# Patient Record
Sex: FEMALE | Race: BLACK OR AFRICAN AMERICAN | NOT HISPANIC OR LATINO | Employment: OTHER | URBAN - METROPOLITAN AREA
[De-identification: names, ages, dates, MRNs, and addresses within clinical notes are randomized per-mention and may not be internally consistent; named-entity substitution may affect disease eponyms.]

---

## 2017-07-27 RX ORDER — ENALAPRIL MALEATE 10 MG/1
TABLET ORAL
Qty: 30 TABLET | Refills: 5 | Status: SHIPPED | OUTPATIENT
Start: 2017-07-27 | End: 2018-01-29 | Stop reason: SDUPTHER

## 2017-08-02 ENCOUNTER — OFFICE VISIT (OUTPATIENT)
Dept: FAMILY MEDICINE | Facility: CLINIC | Age: 57
End: 2017-08-02
Payer: COMMERCIAL

## 2017-08-02 VITALS
BODY MASS INDEX: 29.23 KG/M2 | DIASTOLIC BLOOD PRESSURE: 82 MMHG | SYSTOLIC BLOOD PRESSURE: 118 MMHG | HEIGHT: 63 IN | TEMPERATURE: 97 F | WEIGHT: 165 LBS

## 2017-08-02 DIAGNOSIS — N30.01 ACUTE CYSTITIS WITH HEMATURIA: ICD-10-CM

## 2017-08-02 PROBLEM — N30.00 ACUTE CYSTITIS: Status: ACTIVE | Noted: 2017-08-02

## 2017-08-02 LAB
BILIRUB SERPL-MCNC: NORMAL MG/DL
BLOOD, POC UA: NORMAL
GLUCOSE UR QL STRIP: NORMAL
KETONES UR QL STRIP: NORMAL
LEUKOCYTE ESTERASE URINE, POC: NORMAL
NITRITE, POC UA: NORMAL
PH, POC UA: 5
PROTEIN, POC: NORMAL
SPECIFIC GRAVITY, POC UA: 1.01
UROBILINOGEN, POC UA: 0.2

## 2017-08-02 PROCEDURE — 99213 OFFICE O/P EST LOW 20 MIN: CPT | Mod: 25,,, | Performed by: FAMILY MEDICINE

## 2017-08-02 PROCEDURE — 81000 URINALYSIS NONAUTO W/SCOPE: CPT | Mod: ,,, | Performed by: FAMILY MEDICINE

## 2017-08-02 NOTE — PATIENT INSTRUCTIONS
Lactobacillus Oral formulations  What is this medicine?  LACTOBACILLUS (regi MCCLELLAN uhs) is a supplement. It is used to help the normal balance of bacteria in the colon. This may treat or prevent diarrhea caused by an infection or by antibiotics. The FDA has not approved this supplement for any medical use.  This supplement may be used for other purposes; ask your health care provider or pharmacist if you have questions.  How should I use this medicine?  Take this medicine by mouth with a small amount of milk, fruit juice, or water. Follow the directions on the package labeling, or take as directed by your health care professional. This medicine can be taken with cereal or other food. Do not take this medicine more often than directed.  Contact your pediatrician regarding the use of this medicine in children. Special care may be needed. This medicine is not recommended for children under 3 years old unless prescribed by a doctor.  What side effects may I notice from receiving this medicine?  Side effects that you should report to your doctor or health care professional as soon as possible:  · allergic reactions like skin rash, itching or hives, swelling of the face, lips, or tongue  · breathing problems  · severe nausea, vomiting  · unusually weak or tired  Side effects that usually do not require medical attention (report to your doctor or health care professional if they continue or are bothersome):  · hiccups  · stomach gas  What may interact with this medicine?  Interactions are not expected.  What if I miss a dose?  If you miss a dose, take it as soon as you can. If it is almost time for your next dose, take only that dose. Do not take double or extra doses.  Where should I keep my medicine?  Keep out of the reach of children.  Store in the refrigerator or as directed on the package label. Do not freeze. Throw away any unused medicine after the expiration date.  What should I tell my health care provider  before I take this medicine?  They need to know if you have any of these conditions:  · chronic disease  · immune system problems  · prosthetic heart valve or valvular heart disease  · an unusual or allergic reaction to Lactobacillus, any medicines, lactose or milk, other foods, dyes, or preservatives  · pregnant or trying to get pregnant  · breast-feeding  What should I watch for while using this medicine?  See your doctor if your symptoms do not get better or if they get worse. Do not take this supplement for more than 2 days or if you have a fever unless your doctor tells you to.  If you have allergies to milk or you are sensitive to lactose, do not use this supplement.  Date Last Reviewed:   NOTE:This sheet is a summary. It may not cover all possible information. If you have questions about this medicine, talk to your doctor, pharmacist, or health care provider. Copyright© 2016 [employer]

## 2017-08-02 NOTE — PROGRESS NOTES
Subjective:       Patient ID: Catrachita Johnson is a 56 y.o. female.    Chief Complaint: Urinary Tract Infection    Urinary Tract Infection    The current episode started 1 to 4 weeks ago (3 weeks). The problem has been waxing and waning. The pain is at a severity of 5/10. The pain is moderate. There has been no fever. She is sexually active. There is a history of pyelonephritis. Associated symptoms include frequency and urgency. Pertinent negatives include no flank pain or hematuria. She has tried nothing for the symptoms.     Review of Systems   Genitourinary: Positive for frequency and urgency. Negative for flank pain and hematuria.     Urine dipstick shows positive for leukocytes.  Micro exam: negative for WBC's or RBC's.    Objective:      Physical Exam   Abdominal: Soft. Normal appearance and bowel sounds are normal. She exhibits no distension and no mass. There is no tenderness. There is no rebound and no guarding.   Musculoskeletal:        Arms:      Assessment:       1. Acute cystitis with hematuria        Plan:       Catrachita was seen today for urinary tract infection.    Diagnoses and all orders for this visit:    Acute cystitis with hematuria  -     Urinalysis w/reflex to Microscopic         Return in about 1 month (around 9/2/2017).

## 2017-08-07 ENCOUNTER — OFFICE VISIT (OUTPATIENT)
Dept: SURGERY | Facility: CLINIC | Age: 57
End: 2017-08-07
Payer: COMMERCIAL

## 2017-08-07 VITALS
HEIGHT: 63 IN | SYSTOLIC BLOOD PRESSURE: 118 MMHG | DIASTOLIC BLOOD PRESSURE: 82 MMHG | BODY MASS INDEX: 29.23 KG/M2 | WEIGHT: 165 LBS

## 2017-08-07 DIAGNOSIS — R92.8 ABNORMAL MAMMOGRAM OF LEFT BREAST: ICD-10-CM

## 2017-08-07 DIAGNOSIS — R22.32 AXILLARY MASS, LEFT: Primary | ICD-10-CM

## 2017-08-07 PROCEDURE — 99204 OFFICE O/P NEW MOD 45 MIN: CPT | Mod: ,,, | Performed by: SURGERY

## 2017-08-07 PROCEDURE — 3008F BODY MASS INDEX DOCD: CPT | Mod: ,,, | Performed by: SURGERY

## 2017-08-07 NOTE — PROGRESS NOTES
Subjective:       Patient ID: Catrachita Johnson is a 57 y.o. female.    Chief Complaint: Breast Problem (Referred by Dr.Robbert Padilla to eval abnormal mammogram)      HPI:  Patient comes in for evaluation of abnormal mammogram. No complaints prompted this mammogram. Patient denies previous issues with her breast before. The mammogram which was abnormal was left. Abnormality was that of heterogenous microcalcifications. Patient also reports that her left axilla appears somewhat puffy but does not hurt.    Family history is positive for second-degree relatives having breast cancer. Patient has children.Menarche age 9 menopause age 55. Patient is not on hormone replacement.    Past Medical History:   Diagnosis Date    Hypertension      Past Surgical History:   Procedure Laterality Date     SECTION      MYOMECTOMY       Review of patient's allergies indicates:   Allergen Reactions    Bactrim [sulfamethoxazole-trimethoprim]     Cherries     Tetracyclines      Medication List with Changes/Refills   Current Medications    ENALAPRIL (VASOTEC) 10 MG TABLET    TAKE 1 TABLET BY MOUTH EVERY DAY     Family History   Problem Relation Age of Onset    Stroke Mother     No Known Problems Father     Stroke Maternal Grandmother      Social History     Social History    Marital status:      Spouse name: N/A    Number of children: N/A    Years of education: N/A     Social History Main Topics    Smoking status: Never Smoker    Smokeless tobacco: Never Used    Alcohol use No    Drug use: No    Sexual activity: Not Asked     Other Topics Concern    None     Social History Narrative    None         Review of Systems   Constitutional: Negative for appetite change, chills, fever and unexpected weight change.   HENT: Negative for hearing loss, rhinorrhea, sore throat and voice change.    Eyes: Negative for photophobia and visual disturbance.   Respiratory: Negative for cough, choking and shortness of breath.     Cardiovascular: Negative for chest pain, palpitations and leg swelling.   Gastrointestinal: Negative for abdominal pain, blood in stool, constipation, diarrhea, nausea and vomiting.   Endocrine: Negative for cold intolerance, heat intolerance, polydipsia and polyuria.   Musculoskeletal: Negative for arthralgias, back pain, joint swelling and neck stiffness.   Skin: Negative for color change, pallor and rash.   Neurological: Negative for dizziness, seizures, syncope and headaches.   Hematological: Negative for adenopathy. Does not bruise/bleed easily.   Psychiatric/Behavioral: Negative for agitation, behavioral problems and confusion.       Objective:      Physical Exam   Constitutional: She is oriented to person, place, and time. She is cooperative. No distress.   Pulmonary/Chest: Right breast exhibits no inverted nipple, no mass and no skin change. Left breast exhibits no inverted nipple, no mass and no skin change. Breasts are symmetrical.   Genitourinary: No breast tenderness or discharge.   Lymphadenopathy:     Axillary adenopathy: left axilla feels a little more full then right.   Neurological: She is alert and oriented to person, place, and time.   Skin:   Incisions are clean, dry and intact   There is no evidence of infection, hematoma or seroma.        Assessment/Plan:   Axillary mass, left  -     US Breast Left Complete    Abnormal mammogram of left breast  -     US Breast Left Complete  -     Mammo Breast Stereotactic Biopsy Left            Return after the biopsy is done.

## 2017-08-17 ENCOUNTER — TELEPHONE (OUTPATIENT)
Dept: FAMILY MEDICINE | Facility: CLINIC | Age: 57
End: 2017-08-17

## 2017-08-17 NOTE — TELEPHONE ENCOUNTER
----- Message from Seble Santamaria sent at 8/17/2017  3:05 PM CDT -----  Billing is saying that the encounter on 08/02/2017 has no physical exam.    Seble

## 2017-08-24 ENCOUNTER — TELEPHONE (OUTPATIENT)
Dept: FAMILY MEDICINE | Facility: CLINIC | Age: 57
End: 2017-08-24

## 2017-08-24 NOTE — TELEPHONE ENCOUNTER
----- Message from Farrah Benitez MA sent at 8/24/2017  3:50 PM CDT -----  Contact: Catrachita Johnson      ----- Message -----  From: Elvira Butcher  Sent: 8/24/2017  11:26 AM  To: Farrah Benitez MA    Patient called (sounded tearful) wanted me to tell Dr GUY that she just fopund out she has breast cancer.

## 2017-08-28 ENCOUNTER — OFFICE VISIT (OUTPATIENT)
Dept: SURGERY | Facility: CLINIC | Age: 57
End: 2017-08-28
Payer: COMMERCIAL

## 2017-08-28 VITALS
DIASTOLIC BLOOD PRESSURE: 82 MMHG | WEIGHT: 165 LBS | SYSTOLIC BLOOD PRESSURE: 118 MMHG | BODY MASS INDEX: 29.23 KG/M2 | HEIGHT: 63 IN

## 2017-08-28 DIAGNOSIS — D05.12 BREAST NEOPLASM, TIS (DCIS), LEFT: Primary | ICD-10-CM

## 2017-08-28 PROCEDURE — 3008F BODY MASS INDEX DOCD: CPT | Mod: ,,, | Performed by: SURGERY

## 2017-08-28 PROCEDURE — 99214 OFFICE O/P EST MOD 30 MIN: CPT | Mod: ,,, | Performed by: SURGERY

## 2017-08-28 NOTE — PROGRESS NOTES
Patient presents for follow-up after undergoing a percutaneous left breast biopsy. Biopsy came back as DCIS. Patient also had an ultrasound of the left axilla which only showed several  Normal-appearing lymph nodes.    Diagnoses and treatment options discussed with patient and her  at length. Breast packet given to the patient. At this time the patient is leaning towards breast conservation therapy. With going to obtain oncology consultation. Patient will think about her options in the meantime. She is going to follow-up here after consultation is done at which time we'll plan surgical therapy. This case will be discussed with the oncologist after consultation is done.

## 2017-08-29 ENCOUNTER — OFFICE VISIT (OUTPATIENT)
Dept: FAMILY MEDICINE | Facility: CLINIC | Age: 57
End: 2017-08-29
Payer: COMMERCIAL

## 2017-08-29 VITALS
DIASTOLIC BLOOD PRESSURE: 86 MMHG | SYSTOLIC BLOOD PRESSURE: 140 MMHG | BODY MASS INDEX: 28.7 KG/M2 | HEIGHT: 63 IN | WEIGHT: 162 LBS | TEMPERATURE: 98 F | RESPIRATION RATE: 16 BRPM | HEART RATE: 64 BPM

## 2017-08-29 DIAGNOSIS — R10.9 ABDOMINAL PAIN, UNSPECIFIED LOCATION: ICD-10-CM

## 2017-08-29 DIAGNOSIS — K58.9 IRRITABLE BOWEL SYNDROME WITHOUT DIARRHEA: ICD-10-CM

## 2017-08-29 LAB
BILIRUB SERPL-MCNC: NORMAL MG/DL
BLOOD, POC UA: NORMAL
GLUCOSE UR QL STRIP: NORMAL
KETONES UR QL STRIP: NORMAL
LEUKOCYTE ESTERASE URINE, POC: NORMAL
NITRITE, POC UA: NORMAL
PH, POC UA: 7
PROTEIN, POC: NORMAL
SPECIFIC GRAVITY, POC UA: 1.01
UROBILINOGEN, POC UA: 0.2

## 2017-08-29 PROCEDURE — 3008F BODY MASS INDEX DOCD: CPT | Mod: ,,, | Performed by: FAMILY MEDICINE

## 2017-08-29 PROCEDURE — 99213 OFFICE O/P EST LOW 20 MIN: CPT | Mod: 25,,, | Performed by: FAMILY MEDICINE

## 2017-08-29 PROCEDURE — 81000 URINALYSIS NONAUTO W/SCOPE: CPT | Mod: ,,, | Performed by: FAMILY MEDICINE

## 2017-08-29 RX ORDER — OXYBUTYNIN CHLORIDE 5 MG/1
5 TABLET ORAL 3 TIMES DAILY
Qty: 90 TABLET | Refills: 11 | Status: SHIPPED | OUTPATIENT
Start: 2017-08-29 | End: 2017-12-05

## 2017-08-29 NOTE — PROGRESS NOTES
Subjective:       Patient ID: Catrachita Johnson is a 57 y.o. female.    Chief Complaint: Abdominal Pain (left lower quandrant ) and Urinary Tract Infection    Abdominal Pain   This is a new problem. The abdominal pain radiates to the LUQ (fleeting transient). Associated symptoms include dysuria (x 2 dd). Nothing aggravates the pain. She has tried nothing for the symptoms. The treatment provided mild relief. There is no history of ulcerative colitis. Patient's medical history includes UTI.     Review of Systems   Gastrointestinal: Positive for abdominal pain.   Genitourinary: Positive for dysuria (x 2 dd).       Objective:      Physical Exam   Constitutional: She appears well-developed and well-nourished.   HENT:   Head: Normocephalic and atraumatic.   Eyes: Conjunctivae and EOM are normal. Pupils are equal, round, and reactive to light.   Cardiovascular: Normal rate, regular rhythm, normal heart sounds and intact distal pulses.  Exam reveals no gallop and no friction rub.    No murmur heard.  Pulmonary/Chest: Effort normal and breath sounds normal. No respiratory distress. She has no wheezes. She has no rales. She exhibits no tenderness.   Abdominal: Soft. Bowel sounds are normal. She exhibits no distension and no mass. There is no tenderness. There is no rebound and no guarding. No hernia.   Genitourinary: Rectal exam shows guaiac negative stool.   Genitourinary Comments: Nl rectal tone , heme negative       Urine dipstick shows negative for all components.  Micro exam: not done.  occolt blood negative.  Assessment:       1. Irritable bowel syndrome without diarrhea    2. Abdominal pain, unspecified location        Plan:       Catrachita was seen today for abdominal pain and urinary tract infection.    Diagnoses and all orders for this visit:    Irritable bowel syndrome without diarrhea  -     POCT OCCULT BLOOD STOOL  -     oxybutynin (DITROPAN) 5 MG Tab; Take 1 tablet (5 mg total) by mouth 3 (three) times daily.  -      Cancel: Ambulatory referral to Gastroenterology  -     Ambulatory referral to Gastroenterology    Abdominal pain, unspecified location  -     POCT Urinalysis         No Follow-up on file.

## 2017-09-01 ENCOUNTER — OFFICE VISIT (OUTPATIENT)
Dept: HEMATOLOGY/ONCOLOGY | Facility: CLINIC | Age: 57
End: 2017-09-01
Payer: COMMERCIAL

## 2017-09-01 VITALS
TEMPERATURE: 98 F | BODY MASS INDEX: 29.06 KG/M2 | HEIGHT: 63 IN | WEIGHT: 164 LBS | SYSTOLIC BLOOD PRESSURE: 143 MMHG | HEART RATE: 76 BPM | DIASTOLIC BLOOD PRESSURE: 83 MMHG | RESPIRATION RATE: 16 BRPM

## 2017-09-01 DIAGNOSIS — D05.12 DUCTAL CARCINOMA IN SITU (DCIS) OF LEFT BREAST: ICD-10-CM

## 2017-09-01 PROCEDURE — 99203 OFFICE O/P NEW LOW 30 MIN: CPT | Mod: ,,, | Performed by: INTERNAL MEDICINE

## 2017-09-01 PROCEDURE — 3008F BODY MASS INDEX DOCD: CPT | Mod: ,,, | Performed by: INTERNAL MEDICINE

## 2017-09-01 NOTE — LETTER
September 1, 2017      Jean-Pierre Waller MD  [address] [facility] [address]           [facility]  [address]  [address]  [facility] [address]  Phone: [phone]  Fax: [phone]          Patient: Catrachita Johnson   MR Number: [id number]   YOB: 1960   Date of Visit: 9/1/2017       Dear Dr. Jean-Pierre Waller:    Thank you for referring Catrachita Johnson to me for evaluation. Attached you will find relevant portions of my assessment and plan of care.    If you have questions, please do not hesitate to call me. I look forward to following Catrachita Johnson along with you.    Sincerely,    Festus Walter MD    Enclosure  CC:  No Recipients    If you would like to receive this communication electronically, please contact [email or web address] or [phone] to request more information on [employer] Link access.    For providers and/or their staff who would like to refer a patient to [facility], please contact us through our one-stop-shop provider referral line, [facility], at [phone].    If you feel you have received this communication in error or would no longer like to receive these types of communications, please e-mail [email or web address]

## 2017-09-05 ENCOUNTER — SOCIAL WORK (OUTPATIENT)
Dept: HEMATOLOGY/ONCOLOGY | Facility: CLINIC | Age: 57
End: 2017-09-05

## 2017-09-05 ENCOUNTER — OFFICE VISIT (OUTPATIENT)
Dept: RADIATION ONCOLOGY | Facility: CLINIC | Age: 57
End: 2017-09-05
Payer: COMMERCIAL

## 2017-09-05 VITALS
HEART RATE: 62 BPM | DIASTOLIC BLOOD PRESSURE: 85 MMHG | RESPIRATION RATE: 18 BRPM | BODY MASS INDEX: 29.23 KG/M2 | WEIGHT: 165 LBS | TEMPERATURE: 98 F | SYSTOLIC BLOOD PRESSURE: 143 MMHG | HEIGHT: 63 IN

## 2017-09-05 DIAGNOSIS — D05.12 DUCTAL CARCINOMA IN SITU (DCIS) OF LEFT BREAST: Primary | ICD-10-CM

## 2017-09-05 PROCEDURE — 3008F BODY MASS INDEX DOCD: CPT | Mod: ,,, | Performed by: RADIOLOGY

## 2017-09-05 PROCEDURE — 99205 OFFICE O/P NEW HI 60 MIN: CPT | Mod: ,,, | Performed by: RADIOLOGY

## 2017-09-05 NOTE — PROGRESS NOTES
Catrachita Johnson  [id number]  1960 9/5/2017  Festus Walter Md  [address]  [address]  [place] [address]    REASON FOR CONSULTATION: 0, [id number] g3 DCIS ER/AL+ of L breast  TREATMENT GOAL: adjuvant    HISTORY OF PRESENT ILLNESS:   57F with abnormal screening MMG @ L breast    *8/17 Dx MMG/US: microcalcifications at 12:00 L breast   Bx: g2-3 DCIS, solid and comedonecrosis; no invasion; ER/AL+    *Dr. Coulter: L axilla US wnl; pt to decide on lump+RT vs mastectomy  *Dr. Walter: lump+RT; adjuvant WINTERS    Patient is expressing anxiety regarding her new diagnosis. She is without complaint at today's visit. She'll previously reported a lump in the left axilla and this was evaluated by ultrasound by Dr. Coulter and found to be within normal limits. Today she is denying fever, chills, chest pain, shortness of breath, appetite or weight changes, restrict range of motion left upper extremity, nipple discharge, pain within the breast.    Review of Systems   Constitutional: Negative for appetite change, chills, fever and unexpected weight change.   HENT:   Negative for lump/mass, mouth sores, sore throat, trouble swallowing and voice change.    Eyes: Negative for eye problems and icterus.   Respiratory: Negative for cough, hemoptysis and shortness of breath.    Cardiovascular: Negative for chest pain and leg swelling.   Gastrointestinal: Negative for abdominal pain, constipation, diarrhea, nausea and vomiting.   Genitourinary: Negative for dysuria, frequency, hematuria, nocturia and vaginal bleeding.    Musculoskeletal: Negative for back pain, gait problem, neck pain and neck stiffness.   Neurological: Negative for extremity weakness, gait problem, headaches, numbness and seizures.   Hematological: Negative for adenopathy.     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    Hypertension     Mass of left axilla      Past Surgical History:  "  Procedure Laterality Date     SECTION      MYOMECTOMY       Social History     Social History    Marital status:      Spouse name: N/A    Number of children: N/A    Years of education: N/A     Social History Main Topics    Smoking status: Never Smoker    Smokeless tobacco: Never Used    Alcohol use No    Drug use: No    Sexual activity: Not Asked     Other Topics Concern    None     Social History Narrative    None     Family History   Problem Relation Age of Onset    Stroke Mother     No Known Problems Father     Stroke Maternal Grandmother        PRIOR HISTORY OF CHEMOTHERAPY OR RADIOTHERAPY: Please see HPI for patients prior oncologic history.    Medication List with Changes/Refills   Current Medications    ENALAPRIL (VASOTEC) 10 MG TABLET    TAKE 1 TABLET BY MOUTH EVERY DAY    OXYBUTYNIN (DITROPAN) 5 MG TAB    Take 1 tablet (5 mg total) by mouth 3 (three) times daily.     Review of patient's allergies indicates:   Allergen Reactions    Bactrim [sulfamethoxazole-trimethoprim]     Cherries     Tetracyclines        QUALITY OF LIFE: 100%- Normal, No Complaints, No Evidence of Disease    Vitals:    17 0914   BP: (!) 143/85   Pulse: 62   Resp: 18   Temp: 98.1 °F (36.7 °C)   TempSrc: Oral   Weight: 74.8 kg (165 lb)   Height: 5' 3" (1.6 m)   PainSc: 0-No pain       PHYSICAL EXAM:   GENERAL: alert; in no apparent distress. anxious  HEAD: normocephalic, atraumatic.  EYES: pupils are equal, round, reactive to light and accommodation. Sclera anicteric. Conjunctiva not injected.   NOSE/THROAT: no nasal erythema or rhinorrhea. Oropharynx pink, without erythema, ulcerations or thrush.   NECK: no cervical motion rigidity; supple with no masses.  CHEST: clear to auscultation bilaterally; no wheezes, crackles or rubs. Patient is speaking comfortably on room air with normal work of breathing without using accessory muscles of respiration.  CARDIOVASCULAR: regular rate and rhythm; no " murmurs, rubs or gallops.  ABDOMEN: soft, nontender, nondistended. Bowel sounds present.   MUSCULOSKELETAL: no tenderness to palpation along the spine or scapulae. Normal range of motion.  NEUROLOGIC: cranial nerves II-XII intact bilaterally. Strength 5/5 in bilateral upper and lower extremities. No sensory deficits appreciated. Normal gait.  LYMPHATIC: no cervical, supraclavicular or axillary adenopathy appreciated bilaterally.   EXTREMITIES: no clubbing, cyanosis, edema.  SKIN: no erythema, rashes, ulcerations noted.   BREAST: modest cup size; no palpable changes, no nodularity of nipple asymmetry; bx site c/d/i    REVIEW OF IMAGING/PATHOLOGY/LABS: Please see HPI. All images reviewed personally by dictating physician.     ASSESSMENT: 57F with stage 0, [id number] g3 DCIS ER/NH+ of L breast.  PLAN:    has been diagnosed with a mammogram detected ductal carcinoma in situ of the left breast. At her biopsy she underwent ultrasound of the left axilla and this was found to be within normal limits without any evidence of lymphadenopathy. Biopsy returned grade 3, estrogen receptor positive, in situ disease. Today I discussed the options of mastectomy versus lumpectomy plus radiation and the equivalent outcomes in several randomized trials. We further discussed potential options of reconstruction after mastectomy versus different radiation courses to follow lumpectomy. We also briefly discussed the role of anti-estrogen therapy following completion of her local therapy.  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    After our discussion today patient  would like to proceed with lumpectomy plus radiation as her local therapy and does have an appointment with Dr. Coulter later this week to schedule her surgery. My recommendation after her examination today is to provide a hypo-fractionated radiotherapy course, 4050 cGy to the left breast, 5050 cGy to left-sided lumpectomy cavity.     We discussed the risks and benefits of the above treatment and have gone over in detail the acute and late toxicities of radiation therapy to the left breast. The patient expressed  understanding and has signed a consent form which is included in the patients chart. The patient has our contact information and understands that they are free to contact us at any time with questions or concerns regarding radiation therapy.    DISPOSITION: RTC for CT SIM 3wks after lumpectomy  TIME SPENT WITH PATIENT: I have personally seen and evaluated this patient. Approximately one hour was spent in consultation with the patient, greater than 50% of this time was spent discussing the personalized oncologic treatment plan and details of radiation therapy with the patient.     PHYSICIAN: Arnie Gomez Jr, MD

## 2017-09-05 NOTE — PATIENT INSTRUCTIONS
Instructions given on breast radiation and [facility] handout.  Patient instructed to call us when she has date of surgery.

## 2017-09-05 NOTE — PROGRESS NOTES
Met with patient to discuss her distress rating of 7-10.  She is overwhelmed with the recent death of her mother along with being diagnosed with breast cancer.  I provided her with supportive services and encouraged her to attend the various support groups provided at [facility].  I also provided her with my contact information in the event she wishes to pursue counseling.

## 2017-09-07 ENCOUNTER — TELEPHONE (OUTPATIENT)
Dept: RADIATION ONCOLOGY | Facility: CLINIC | Age: 57
End: 2017-09-07

## 2017-09-07 NOTE — PROGRESS NOTES
Called patient to follow-up with her due to her distress rating 7-10 denoted on 09/05/17.  She stated that she is feeling much better since her consult with Dr. Gomez.  She plans to attend as many support groups as her schedule permits.  She continues to deny wanting counseling services at this time.  She has my contact information in the event she wants to pursue psychosocial support.

## 2017-09-08 ENCOUNTER — OFFICE VISIT (OUTPATIENT)
Dept: SURGERY | Facility: CLINIC | Age: 57
End: 2017-09-08
Payer: COMMERCIAL

## 2017-09-08 VITALS
BODY MASS INDEX: 29.23 KG/M2 | HEIGHT: 63 IN | DIASTOLIC BLOOD PRESSURE: 85 MMHG | SYSTOLIC BLOOD PRESSURE: 143 MMHG | WEIGHT: 165 LBS

## 2017-09-08 DIAGNOSIS — D05.12 BREAST NEOPLASM, TIS (DCIS), LEFT: Primary | ICD-10-CM

## 2017-09-08 LAB
ALBUMIN SERPL-MCNC: 4.1 G/DL (ref 3.1–4.7)
ALP SERPL-CCNC: 86 IU/L (ref 40–104)
ALT (SGPT): 21 IU/L (ref 3–33)
AST SERPL-CCNC: 24 IU/L (ref 10–40)
BASOPHILS NFR BLD: 0 K/UL (ref 0–0.2)
BASOPHILS NFR BLD: 0.4 %
BILIRUB SERPL-MCNC: 0.6 MG/DL (ref 0.3–1)
BUN SERPL-MCNC: 12 MG/DL (ref 8–20)
CALCIUM SERPL-MCNC: 9.5 MG/DL (ref 7.7–10.4)
CHLORIDE: 105 MMOL/L (ref 98–110)
CO2 SERPL-SCNC: 28.9 MMOL/L (ref 22.8–31.6)
CREATININE: 0.79 MG/DL (ref 0.6–1.4)
EOSINOPHIL NFR BLD: 0.1 K/UL (ref 0–0.7)
EOSINOPHIL NFR BLD: 0.9 %
ERYTHROCYTE [DISTWIDTH] IN BLOOD BY AUTOMATED COUNT: 12.8 % (ref 11.7–14.9)
GLUCOSE: 83 MG/DL (ref 70–99)
GRAN #: 3.4 K/UL (ref 1.4–6.5)
GRAN%: 62.2 %
HCT VFR BLD AUTO: 36.1 % (ref 36–48)
HGB BLD-MCNC: 11.6 G/DL (ref 12–15)
IMMATURE GRANS (ABS): 0 K/UL (ref 0–1)
IMMATURE GRANULOCYTES: 0.2 %
LYMPH #: 1.6 K/UL (ref 1.2–3.4)
LYMPH%: 29.3 %
MCH RBC QN AUTO: 28.5 PG (ref 25–35)
MCHC RBC AUTO-ENTMCNC: 32.1 G/DL (ref 31–36)
MCV RBC AUTO: 88.7 FL (ref 79–98)
MONO #: 0.4 K/UL (ref 0.1–0.6)
MONO%: 7 %
NUCLEATED RBCS: 0 %
PLATELET # BLD AUTO: 233 K/UL (ref 140–440)
PMV BLD AUTO: 9.8 FL (ref 8.8–12.7)
POTASSIUM SERPL-SCNC: 4.3 MMOL/L (ref 3.5–5)
PROT SERPL-MCNC: 7.6 G/DL (ref 6–8.2)
RBC # BLD AUTO: 4.07 M/UL (ref 3.5–5.5)
SODIUM: 141 MMOL/L (ref 134–144)
WBC # BLD AUTO: 5.4 K/UL (ref 5–10)

## 2017-09-08 PROCEDURE — 99213 OFFICE O/P EST LOW 20 MIN: CPT | Mod: ,,, | Performed by: SURGERY

## 2017-09-08 PROCEDURE — 3008F BODY MASS INDEX DOCD: CPT | Mod: ,,, | Performed by: SURGERY

## 2017-09-08 NOTE — PROGRESS NOTES
Subjective:       Patient ID: Catrachita Johnson is a 57 y.o. female.    Chief Complaint: Other (Reeval for possible left lumpectomy)      HPI:  Patient with left-sided DCIS presents for surgical treatment. She has been seen by oncologist and radiation oncologist. Breast conservation therapy is planned.    Past Medical History:   Diagnosis Date    Cancer     breast    Ductal carcinoma in situ (DCIS) of left breast 2017    Hypertension     Mass of left axilla      Past Surgical History:   Procedure Laterality Date     SECTION      MYOMECTOMY       Review of patient's allergies indicates:   Allergen Reactions    Bactrim [sulfamethoxazole-trimethoprim]     Cherries     Tetracyclines      Medication List with Changes/Refills   Current Medications    ENALAPRIL (VASOTEC) 10 MG TABLET    TAKE 1 TABLET BY MOUTH EVERY DAY    OXYBUTYNIN (DITROPAN) 5 MG TAB    Take 1 tablet (5 mg total) by mouth 3 (three) times daily.     Family History   Problem Relation Age of Onset    Stroke Mother     No Known Problems Father     Stroke Maternal Grandmother      Social History     Social History    Marital status:      Spouse name: N/A    Number of children: N/A    Years of education: N/A     Social History Main Topics    Smoking status: Never Smoker    Smokeless tobacco: Never Used    Alcohol use No    Drug use: No    Sexual activity: Not Asked     Other Topics Concern    None     Social History Narrative    None         Review of Systems    Objective:      Physical Exam   Constitutional: She appears well-developed and well-nourished.  Non-toxic appearance. No distress.   HENT:   Head: Normocephalic and atraumatic. Head is without abrasion and without laceration.   Right Ear: External ear normal.   Left Ear: External ear normal.   Nose: Nose normal.   Mouth/Throat: Oropharynx is clear and moist.   Eyes: EOM are normal. Pupils are equal, round, and reactive to light.   Neck: Trachea normal. No tracheal  deviation and normal range of motion present. No thyroid mass and no thyromegaly present.   Cardiovascular: Normal rate and regular rhythm.    Pulmonary/Chest: Effort normal. No accessory muscle usage. No tachypnea. No respiratory distress.   Abdominal: Soft. Normal appearance and bowel sounds are normal. She exhibits no distension and no mass. There is no hepatosplenomegaly. There is no tenderness. There is no tenderness at McBurney's point and negative Rankin's sign. No hernia.   Lymphadenopathy:     She has no cervical adenopathy.     She has no axillary adenopathy.        Right: No inguinal adenopathy present.        Left: No inguinal adenopathy present.   Neurological: She is alert. Coordination and gait normal.   Skin: Skin is warm and intact.   Psychiatric: She has a normal mood and affect. Her speech is normal and behavior is normal.       Assessment/Plan:   Breast neoplasm, Tis (DCIS), left  -     Ambulatory Referral to External Surgery  -     CBC auto differential; Future; Expected date: 09/08/2017  -     Comprehensive metabolic panel; Future; Expected date: 09/08/2017  -     EKG 12-lead; Future  -     X-Ray Chest PA And Lateral        Planned procedure: Left lumpectomy with x-ray localization, left sentinel node biopsy with frozen section, possible left axillary node dissection    Ancef 2 gm IV on call to OR    NPO past midnight    Americo cloth scrub per protocol    SCDs Bilateral Lower Extremities    I discussed the proposed procedures the the patient including risks, benefits, indications, alternatives and special concerns.  The patient appears to understand and agrees to go ahead with surgery.  I have made no promises, warranties or verbal agreements beyond what was discussed above.    No Follow-up on file.

## 2017-09-26 ENCOUNTER — OFFICE VISIT (OUTPATIENT)
Dept: HEMATOLOGY/ONCOLOGY | Facility: CLINIC | Age: 57
End: 2017-09-26
Payer: COMMERCIAL

## 2017-09-26 VITALS
BODY MASS INDEX: 29.89 KG/M2 | TEMPERATURE: 98 F | HEART RATE: 69 BPM | SYSTOLIC BLOOD PRESSURE: 158 MMHG | DIASTOLIC BLOOD PRESSURE: 91 MMHG | WEIGHT: 168.69 LBS | HEIGHT: 63 IN | RESPIRATION RATE: 18 BRPM

## 2017-09-26 DIAGNOSIS — D05.12 DUCTAL CARCINOMA IN SITU (DCIS) OF LEFT BREAST: Primary | ICD-10-CM

## 2017-09-26 PROCEDURE — 3008F BODY MASS INDEX DOCD: CPT | Mod: ,,, | Performed by: INTERNAL MEDICINE

## 2017-09-26 PROCEDURE — 99214 OFFICE O/P EST MOD 30 MIN: CPT | Mod: ,,, | Performed by: INTERNAL MEDICINE

## 2017-09-26 RX ORDER — HYDROCODONE BITARTRATE AND ACETAMINOPHEN 7.5; 325 MG/1; MG/1
TABLET ORAL
Refills: 0 | COMMUNITY
Start: 2017-09-13 | End: 2018-11-27

## 2017-09-26 NOTE — LETTER
September 26, 2017      Jean-Pierre Waller MD  [address] [facility] [address]           [facility] access hospital Hematology Oncology  [address]  [address]  [facility] [address]  Phone: [phone]  Fax: [phone]          Patient: Catrachita Johnson   MR Number: [id number]   YOB: 1960   Date of Visit: 9/26/2017       Dear Dr. Jean-Pierre Waller:    Thank you for referring Catrachita Johnson to me for evaluation. Attached you will find relevant portions of my assessment and plan of care.    If you have questions, please do not hesitate to call me. I look forward to following Catrachita Johnson along with you.    Sincerely,    Festus Walter MD    Enclosure  CC:  No Recipients    If you would like to receive this communication electronically, please contact [email or web address] or [phone] to request more information on [employer] Link access.    For providers and/or their staff who would like to refer a patient to [facility], please contact us through our one-stop-shop provider referral line, [facility] [place], at [phone].    If you feel you have received this communication in error or would no longer like to receive these types of communications, please e-mail [email or web address]

## 2017-09-26 NOTE — PROGRESS NOTES
[facility] Hematology/Oncology  PROGRESS NOTE      Subjective:       Patient ID:   NAME: Catrachita Johnson : 1960     57 y.o. female    Referring Doc: Marylin  Other Physicians: Patricia Waller    Chief Complaint:  DCIS f/u    History of Present Illness:     Patient returns today for a regularly scheduled follow-up visit.  The patient had her lumpectomy on 2017. She has already met with Dr Gomez prior to the surgery. She is doing ok and appears to be healing well. She is seeing Dr Coulter this Wednesday. No invasive cancer was seen on the specimen and the LN was negative. She denies any CP, SOB, HA's or N/V.             ROS:   GEN: normal without any fever, night sweats or weight loss  HEENT: normal with no HA's, sore throat, stiff neck, changes in vision  CV: normal with no CP, SOB, PND, JACKSON or orthopnea  PULM: normal with no SOB, cough, hemoptysis, sputum or pleuritic pain  GI: normal with no abdominal pain, nausea, vomiting, constipation, diarrhea, melanotic stools, BRBPR, or hematemesis  : normal with no hematuria, dysuria  BREAST: normal with no mass, discharge, pain  SKIN: normal with no rash, erythema, bruising, or swelling    Allergies:  Review of patient's allergies indicates:   Allergen Reactions    Bactrim [sulfamethoxazole-trimethoprim]     Cherries     Tetracyclines        Medications:    Current Outpatient Prescriptions:     enalapril (VASOTEC) 10 MG tablet, TAKE 1 TABLET BY MOUTH EVERY DAY, Disp: 30 tablet, Rfl: 5    hydrocodone-acetaminophen 7.5-325mg (NORCO) 7.5-325 mg per tablet, TK 1 T PO Q 4 H PRN, Disp: , Rfl: 0    oxybutynin (DITROPAN) 5 MG Tab, Take 1 tablet (5 mg total) by mouth 3 (three) times daily., Disp: 90 tablet, Rfl: 11    PMHx/PSHx Updates:  See patient's last visit with me on 2017.  See H&P on 2017        Pathology:  Ductal carcinoma in situ (DCIS) of left breast    Staging form: Onc Breast AJCC V7    - Clinical: Stage 0 (Tis (DCIS), N0, M0) - Signed by Arnie  "Patricia Oscar MD on 9/5/2017 9/13/2017 on chart      Objective:     Vitals:  Blood pressure (!) 158/91, pulse 69, temperature 97.6 °F (36.4 °C), resp. rate 18, height 5' 3" (1.6 m), weight 76.5 kg (168 lb 11.2 oz).    Physical Examination:   GEN: no apparent distress, comfortable; AAOx3  HEAD: atraumatic and normocephalic  EYES: no pallor, no icterus, PERRLA  ENT: OMM, no pharyngeal erythema, external ears WNL; no nasal discharge; no thrush  NECK: no masses, thyroid normal, trachea midline, no LAD/LN's, supple  CV: RRR with no murmur; normal pulse; normal S1 and S2; no pedal edema  CHEST: Normal respiratory effort; CTAB; normal breath sounds; no wheeze or crackles  ABDOM: nontender and nondistended; soft; normal bowel sounds; no rebound/guarding  MUSC/Skeletal: ROM normal; no crepitus; joints normal; no deformities or arthropathy  EXTREM: no clubbing, cyanosis, inflammation or swelling  SKIN: no rashes, lesions, ulcers, petechiae or subcutaneous nodules  : no simons  NEURO: grossly intact; motor/sensory WNL; AAOx3; no tremors  PSYCH: normal mood, affect and behavior  LYMPH: normal cervical, supraclavicular, axillary and groin LN's  BREAST: left lumpectomy - healing well; no drains  place          Labs:   9/8/2017  Lab Results   Component Value Date    WBC 5.4 09/08/2017    HGB 11.6 (L) 09/08/2017    HCT 36.1 09/08/2017    MCV 88.7 09/08/2017     09/08/2017     BMP  Lab Results   Component Value Date     09/08/2017    K 4.3 09/08/2017     09/08/2017    CO2 28.9 09/08/2017    BUN 12 09/08/2017    CREATININE 0.79 09/08/2017    CALCIUM 9.5 09/08/2017             Radiology/Diagnostic Studies:    No results found.    I have reviewed all available lab results and radiology reports.    Assessment/Plan:   (1) 57 y.o. female with diagnosis of DCIS who has been referred by Dr Shafor for oncology evaluation  - followed by Dr Waldo Padilla with GYN  - positive family history of breast cancer (2nd " degree relatives)  - DCIS with no invasive component  - ER+/RI+  - discussed the prognostics of DCIS especially the up to 25% risk of breast cancer development in patients who do not have resective surgery  - Dr Coulter with s/p lumpectomy on 9/13/2017; she will require radiation afterwards to provide equivocal survival benefit when compared to patient who have complete mastectomies  - I discussed the recommendation for postoperative/radiation antihormone therapy with tamoxifen     (2) Irritable bowel syndrome     (3) HTN              PLAN:  1. F/u with Dr Coulter this Thursday  2. Will need to f/u with Dr Gomez with rad/onc  3. RTC in 4-6 weeks  Fax note to Jean-Pierre Waller MD, Patricia Coulter    Discussion:     I have explained all of the above in detail and the patient understands all of the current recommendation(s). I have answered all of their questions to the best of my ability and to their complete satisfaction.   The patient is to continue with the current management plan.            Electronically signed by Festus Walter MD

## 2017-09-28 ENCOUNTER — OFFICE VISIT (OUTPATIENT)
Dept: SURGERY | Facility: CLINIC | Age: 57
End: 2017-09-28
Payer: COMMERCIAL

## 2017-09-28 VITALS
HEIGHT: 63 IN | BODY MASS INDEX: 29.88 KG/M2 | WEIGHT: 168.63 LBS | SYSTOLIC BLOOD PRESSURE: 158 MMHG | DIASTOLIC BLOOD PRESSURE: 91 MMHG

## 2017-09-28 DIAGNOSIS — D05.12 BREAST NEOPLASM, TIS (DCIS), LEFT: Primary | ICD-10-CM

## 2017-09-28 PROCEDURE — 99024 POSTOP FOLLOW-UP VISIT: CPT | Mod: ,,, | Performed by: SURGERY

## 2017-09-28 NOTE — PROGRESS NOTES
Subjective:       Patient ID: Catrachita Johnson is a 57 y.o. female.    Chief Complaint: Post-op Evaluation (FU DOS 9/13/17 Lt Lumpectomy w/X-Ray Loc w/ SNL Bx and FZ Section)      HPI:  Catrachita Johnson is here for post-op. Patient has no systemic complaints. Post operative   pain is under control.  Tolerating diet, no nausea/vomiting.  Having normal bowel movements.        Objective:      Physical Exam   Constitutional: She is oriented to person, place, and time. She is cooperative. No distress.   Neurological: She is alert and oriented to person, place, and time.   Skin:   Incisions are clean, dry and intact   There is no evidence of infection, hematoma or seroma.        Assessment/Plan:   Breast neoplasm, Tis (DCIS), left      No complains except incisional pain with moving her arm. Path shows clear margins and negative sentinel lymph node.    Doing well postop. Follow-up with he mom and radiation oncology. Follow-up here when necessary

## 2017-10-05 ENCOUNTER — DOCUMENTATION ONLY (OUTPATIENT)
Dept: RADIATION ONCOLOGY | Facility: CLINIC | Age: 57
End: 2017-10-05

## 2017-10-05 ENCOUNTER — TREATMENT (OUTPATIENT)
Dept: RADIATION ONCOLOGY | Facility: CLINIC | Age: 57
End: 2017-10-05
Payer: COMMERCIAL

## 2017-10-05 PROCEDURE — 77263 THER RADIOLOGY TX PLNG CPLX: CPT | Mod: ,,, | Performed by: RADIOLOGY

## 2017-10-05 PROCEDURE — 77334 RADIATION TREATMENT AID(S): CPT | Mod: ,,, | Performed by: RADIOLOGY

## 2017-10-05 PROCEDURE — 77290 THER RAD SIMULAJ FIELD CPLX: CPT | Mod: ,,, | Performed by: RADIOLOGY

## 2017-10-05 NOTE — PROGRESS NOTES
Catrachita Johnson  [id number]  1960  10/5/2017  No referring provider defined for this encounter.    DIAGNOSIS: 0, pTisN0(sn)M0 DCIS of L breast  TREATMENT SITE(S): left breast    INTENT: CURATIVE    TREATMENT SETTING: ADJUVANT     MODALITY: PHOTON    TECHNIQUE:  3D CONFORMAL RADIOTHERAPY (3DCRT)    IMRT MEDICAL NECESSITY: N/A    I have personally performed treatment planning for the patient, reviewing relevant history/physical and imaging. I have defined GTV, CTV, PTV and organs at risk.     In order to accomplish this plan, I am ordering:  SIMULATION: CT SIMULATION FOR PLACEMENT OF TREATMENT FIELDS    CONTRAST: none    TO ACCOMPLISH REPRODUCIBLE POSITION: VACLOC, BREAST BOARD and WING BOARD    DEVICES FOR BEAM SHAPING: CUSTOMIZED MLC    CUSTOMIZED BOLUS: none    IMAGING: DAILY KV/KV OBI, WEEKLY PORTALS and CBCT DAILY @ boost    I have ordered a weekly physics check.    SPECIAL PHYSICS CONSULT: NO  REASON: N/A    SPECIAL TREATMENT CIRCUMSTANCE: NO  Concurrent or recent administration of chemotherapeutic agents which are known potent radiosensitizers and thus will require vigilant monitoring for  exaggerated radiation toxicities.    LABS: NONE    ANTICIPATED PRESCRIPTION: 4050cGy/15frx to L breast + 1000cGY/5frx to lumpectomy (total 5050cGy/20frx) using 6/23X to isocenter    TREATMENT: DAILY    PHYSICIAN: Arnie Gomez Jr, MD

## 2017-10-06 PROCEDURE — 77300 RADIATION THERAPY DOSE PLAN: CPT | Mod: ,,, | Performed by: RADIOLOGY

## 2017-10-06 PROCEDURE — 77334 RADIATION TREATMENT AID(S): CPT | Mod: ,,, | Performed by: RADIOLOGY

## 2017-10-06 PROCEDURE — 77295 3-D RADIOTHERAPY PLAN: CPT | Mod: ,,, | Performed by: RADIOLOGY

## 2017-10-10 ENCOUNTER — TREATMENT (OUTPATIENT)
Dept: RADIATION ONCOLOGY | Facility: CLINIC | Age: 57
End: 2017-10-10
Payer: COMMERCIAL

## 2017-10-10 PROCEDURE — G6014 RADIATION TREATMENT DELIVERY: HCPCS | Mod: ,,, | Performed by: RADIOLOGY

## 2017-10-10 PROCEDURE — G6002 STEREOSCOPIC X-RAY GUIDANCE: HCPCS | Mod: ,,, | Performed by: RADIOLOGY

## 2017-10-10 PROCEDURE — 77280 THER RAD SIMULAJ FIELD SMPL: CPT | Mod: ,,, | Performed by: RADIOLOGY

## 2017-10-17 PROCEDURE — 77427 RADIATION TX MANAGEMENT X5: CPT | Mod: ,,, | Performed by: RADIOLOGY

## 2017-10-19 ENCOUNTER — TREATMENT (OUTPATIENT)
Dept: RADIATION ONCOLOGY | Facility: CLINIC | Age: 57
End: 2017-10-19
Payer: COMMERCIAL

## 2017-10-19 PROCEDURE — G6002 STEREOSCOPIC X-RAY GUIDANCE: HCPCS | Mod: ,,, | Performed by: RADIOLOGY

## 2017-10-19 PROCEDURE — G6014 RADIATION TREATMENT DELIVERY: HCPCS | Mod: ,,, | Performed by: RADIOLOGY

## 2017-10-24 PROCEDURE — 77427 RADIATION TX MANAGEMENT X5: CPT | Mod: ,,, | Performed by: RADIOLOGY

## 2017-10-25 ENCOUNTER — TREATMENT (OUTPATIENT)
Dept: RADIATION ONCOLOGY | Facility: CLINIC | Age: 57
End: 2017-10-25
Payer: COMMERCIAL

## 2017-10-25 PROCEDURE — G6014 RADIATION TREATMENT DELIVERY: HCPCS | Mod: ,,, | Performed by: RADIOLOGY

## 2017-10-25 PROCEDURE — G6002 STEREOSCOPIC X-RAY GUIDANCE: HCPCS | Mod: ,,, | Performed by: RADIOLOGY

## 2017-10-26 ENCOUNTER — TREATMENT (OUTPATIENT)
Dept: RADIATION ONCOLOGY | Facility: CLINIC | Age: 57
End: 2017-10-26
Payer: COMMERCIAL

## 2017-10-26 PROCEDURE — 77307 TELETHX ISODOSE PLAN CPLX: CPT | Mod: ,,, | Performed by: RADIOLOGY

## 2017-10-26 PROCEDURE — G6014 RADIATION TREATMENT DELIVERY: HCPCS | Mod: ,,, | Performed by: RADIOLOGY

## 2017-10-26 PROCEDURE — G6002 STEREOSCOPIC X-RAY GUIDANCE: HCPCS | Mod: ,,, | Performed by: RADIOLOGY

## 2017-10-26 PROCEDURE — 77334 RADIATION TREATMENT AID(S): CPT | Mod: ,,, | Performed by: RADIOLOGY

## 2017-10-26 PROCEDURE — 77290 THER RAD SIMULAJ FIELD CPLX: CPT | Mod: ,,, | Performed by: RADIOLOGY

## 2017-10-31 ENCOUNTER — TREATMENT (OUTPATIENT)
Dept: RADIATION ONCOLOGY | Facility: CLINIC | Age: 57
End: 2017-10-31
Payer: COMMERCIAL

## 2017-10-31 DIAGNOSIS — D05.12 DUCTAL CARCINOMA IN SITU (DCIS) OF LEFT BREAST: Primary | ICD-10-CM

## 2017-10-31 PROCEDURE — 77014 PR  CT GUIDANCE PLACEMENT RAD THERAPY FIELDS: CPT | Mod: ,,, | Performed by: RADIOLOGY

## 2017-10-31 PROCEDURE — 77427 RADIATION TX MANAGEMENT X5: CPT | Mod: ,,, | Performed by: RADIOLOGY

## 2017-10-31 PROCEDURE — G6012 RADIATION TREATMENT DELIVERY: HCPCS | Mod: ,,, | Performed by: RADIOLOGY

## 2017-10-31 RX ORDER — SILVER SULFADIAZINE 10 G/1000G
CREAM TOPICAL 2 TIMES DAILY
Qty: 4 G | Refills: 2 | Status: SHIPPED | OUTPATIENT
Start: 2017-10-31 | End: 2018-11-27

## 2017-11-06 ENCOUNTER — TREATMENT (OUTPATIENT)
Dept: RADIATION ONCOLOGY | Facility: CLINIC | Age: 57
End: 2017-11-06
Payer: COMMERCIAL

## 2017-11-06 PROCEDURE — 77014 PR  CT GUIDANCE PLACEMENT RAD THERAPY FIELDS: CPT | Mod: ,,, | Performed by: RADIOLOGY

## 2017-11-06 PROCEDURE — 77336 RADIATION PHYSICS CONSULT: CPT | Mod: ,,, | Performed by: RADIOLOGY

## 2017-11-06 PROCEDURE — G6012 RADIATION TREATMENT DELIVERY: HCPCS | Mod: ,,, | Performed by: RADIOLOGY

## 2017-11-07 ENCOUNTER — OFFICE VISIT (OUTPATIENT)
Dept: HEMATOLOGY/ONCOLOGY | Facility: CLINIC | Age: 57
End: 2017-11-07
Payer: COMMERCIAL

## 2017-11-07 VITALS
WEIGHT: 165.63 LBS | TEMPERATURE: 98 F | RESPIRATION RATE: 18 BRPM | HEART RATE: 73 BPM | HEIGHT: 63 IN | DIASTOLIC BLOOD PRESSURE: 78 MMHG | BODY MASS INDEX: 29.35 KG/M2 | SYSTOLIC BLOOD PRESSURE: 126 MMHG

## 2017-11-07 DIAGNOSIS — D05.12 DUCTAL CARCINOMA IN SITU (DCIS) OF LEFT BREAST: Primary | ICD-10-CM

## 2017-11-07 PROCEDURE — 99214 OFFICE O/P EST MOD 30 MIN: CPT | Mod: ,,, | Performed by: INTERNAL MEDICINE

## 2017-11-07 NOTE — LETTER
November 7, 2017      Jean-Pierre Waller MD  [address] [facility] [address]           [facility] Hematology Oncology  [address]  [address]  [facility] [address]  Phone: [phone]  Fax: [phone]          Patient: Catrachita Johnson   MR Number: [id number]   YOB: 1960   Date of Visit: 11/7/2017       Dear Dr. Jean-Pierre Waller:    Thank you for referring Catrachita Johnson to me for evaluation. Attached you will find relevant portions of my assessment and plan of care.    If you have questions, please do not hesitate to call me. I look forward to following Catrachita Johnson along with you.    Sincerely,    Festus Walter MD    Enclosure  CC:  No Recipients    If you would like to receive this communication electronically, please contact [email or web address] or [phone] to request more information on [email or web address] Link access.    For providers and/or their staff who would like to refer a patient to [facility], please contact us through our one-stop-shop provider referral line, [facility], at [phone].    If you feel you have received this communication in error or would no longer like to receive these types of communications, please e-mail [email or web address]

## 2017-11-07 NOTE — PROGRESS NOTES
[facility] Hematology/Oncology  PROGRESS NOTE      Subjective:       Patient ID:   NAME: Catrachita Johnson : 1960     57 y.o. female    Referring Doc: Jean-Pierre Waller MD  Other Physicians: Patricia Coulter    Chief Complaint:  DCIS f/u    History of Present Illness:     Patient returns today for a regularly scheduled follow-up visit.  The patient completed XRT yesterday and has had some skin burning as a result. She saw Dr Gomez last week and sees him again next week. She denies any CP, SOB, HA's or N/V. Some pain at the radiation burn site.             ROS:   GEN: normal without any fever, night sweats or weight loss  HEENT: normal with no HA's, sore throat, stiff neck, changes in vision  CV: normal with no CP, SOB, PND, JACKSON or orthopnea  PULM: normal with no SOB, cough, hemoptysis, sputum or pleuritic pain  GI: normal with no abdominal pain, nausea, vomiting, constipation, diarrhea, melanotic stools, BRBPR, or hematemesis  : normal with no hematuria, dysuria  BREAST: normal with no mass, discharge, pain  SKIN: normal with no rash, erythema, bruising, or swelling    Allergies:  Review of patient's allergies indicates:   Allergen Reactions    Bactrim [sulfamethoxazole-trimethoprim]     Chersandra     Tetracyclines        Medications:    Current Outpatient Prescriptions:     enalapril (VASOTEC) 10 MG tablet, TAKE 1 TABLET BY MOUTH EVERY DAY, Disp: 30 tablet, Rfl: 5    hydrocodone-acetaminophen 7.5-325mg (NORCO) 7.5-325 mg per tablet, TK 1 T PO Q 4 H PRN, Disp: , Rfl: 0    oxybutynin (DITROPAN) 5 MG Tab, Take 1 tablet (5 mg total) by mouth 3 (three) times daily., Disp: 90 tablet, Rfl: 11    silver sulfADIAZINE 1% (SILVADENE) 1 % cream, Apply topically 2 (two) times daily., Disp: 4 g, Rfl: 2    PMHx/PSHx Updates:  See patient's last visit with me on 2017.  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 rate 18, height 5' 3" (1.6 m), weight 75.1 kg (165 lb 9.6 oz).    Physical Examination:   GEN: no apparent distress, comfortable; AAOx3  HEAD: atraumatic and normocephalic  EYES: no pallor, no icterus, PERRLA  ENT: OMM, no pharyngeal erythema, external ears WNL; no nasal discharge; no thrush  NECK: no masses, thyroid normal, trachea midline, no LAD/LN's, supple  CV: RRR with no murmur; normal pulse; normal S1 and S2; no pedal edema  CHEST: Normal respiratory effort; CTAB; normal breath sounds; no wheeze or crackles  ABDOM: nontender and nondistended; soft; normal bowel sounds; no rebound/guarding  MUSC/Skeletal: ROM normal; no crepitus; joints normal; no deformities or arthropathy  EXTREM: no clubbing, cyanosis, inflammation or swelling  SKIN: no rashes, lesions, ulcers, petechiae or subcutaneous nodules; radiation burn  : no simons  NEURO: grossly intact; motor/sensory WNL; AAOx3; no tremors  PSYCH: normal mood, affect and behavior  LYMPH: normal cervical, supraclavicular, axillary and groin LN's            Labs:     9/8/2017  Lab Results   Component Value Date    WBC 5.4 09/08/2017    HGB 11.6 (L) 09/08/2017    HCT 36.1 09/08/2017    MCV 88.7 09/08/2017     09/08/2017           Radiology/Diagnostic Studies:    No results found.    I have reviewed all available lab results and radiology reports.    Assessment/Plan:   (1) 57 y.o. female with diagnosis of DCIS who has been referred by Dr Coulter for oncology evaluation  - followed by Dr Waldo Padilla with GYN  - positive family history of breast cancer (2nd degree relatives)  - DCIS with no invasive component  - ER+/MN+  - discussed the prognostics of DCIS especially the up to 25% risk of breast cancer development in patients who do not have resective surgery  - Dr Coulter with s/p lumpectomy on " 9/13/2017; required radiation afterwards to provide equivocal survival benefit when compared to patient who have complete mastectomies  - I discussed the recommendation for postoperative radiation and also antihormone therapy with tamoxifen     (2) Irritable bowel syndrome     (3) HTN - followed by PCP      PLAN:  1. Discussed tamoxifen but will give her couple of weeks to heal up first  2. F/u with Dr Gomez in 3 weeks  3. Check with rad/onc about use of cool packs  4. Continue with aquafore and silvadene cream and recommend avoiding use of sports bra for now  RTC in 3 weeks  Fax note to Jean-Pierre Waller MD, Patricia Coulter [place]    Discussion:     I have explained all of the above in detail and the patient understands all of the current recommendation(s). I have answered all of their questions to the best of my ability and to their complete satisfaction.   The patient is to continue with the current management plan.            Electronically signed by Festus Walter MD

## 2017-11-27 ENCOUNTER — OFFICE VISIT (OUTPATIENT)
Dept: RADIATION ONCOLOGY | Facility: CLINIC | Age: 57
End: 2017-11-27
Payer: COMMERCIAL

## 2017-11-27 ENCOUNTER — OFFICE VISIT (OUTPATIENT)
Dept: HEMATOLOGY/ONCOLOGY | Facility: CLINIC | Age: 57
End: 2017-11-27
Payer: COMMERCIAL

## 2017-11-27 VITALS — WEIGHT: 164.5 LBS | BODY MASS INDEX: 29.14 KG/M2

## 2017-11-27 VITALS
BODY MASS INDEX: 29.26 KG/M2 | WEIGHT: 165.19 LBS | DIASTOLIC BLOOD PRESSURE: 76 MMHG | RESPIRATION RATE: 18 BRPM | TEMPERATURE: 98 F | HEART RATE: 81 BPM | SYSTOLIC BLOOD PRESSURE: 137 MMHG

## 2017-11-27 DIAGNOSIS — D05.12 DUCTAL CARCINOMA IN SITU (DCIS) OF LEFT BREAST: Primary | ICD-10-CM

## 2017-11-27 PROCEDURE — 99214 OFFICE O/P EST MOD 30 MIN: CPT | Mod: ,,, | Performed by: INTERNAL MEDICINE

## 2017-11-27 PROCEDURE — 99024 POSTOP FOLLOW-UP VISIT: CPT | Mod: ,,, | Performed by: RADIOLOGY

## 2017-11-27 RX ORDER — TAMOXIFEN CITRATE 20 MG/1
20 TABLET ORAL DAILY
Qty: 30 TABLET | Refills: 4 | Status: SHIPPED | OUTPATIENT
Start: 2017-11-27 | End: 2018-04-19 | Stop reason: SDUPTHER

## 2017-11-27 NOTE — LETTER
November 27, 2017      Jean-Pierre Waller MD  [address] [facility] [address]           [facility] Hematology Oncology  [address]  [address]  [facility] [address]  Phone: [phone]  Fax: [phone]          Patient: Catrachita Johnson   MR Number: [id number]   YOB: 1960   Date of Visit: 11/27/2017       Dear Dr. Jean-Pierre Waller:    Thank you for referring Catrachita Johnson to me for evaluation. Attached you will find relevant portions of my assessment and plan of care.    If you have questions, please do not hesitate to call me. I look forward to following Catrachita Johnson along with you.    Sincerely,    Festus Walter MD    Enclosure  CC:  No Recipients    If you would like to receive this communication electronically, please contact [email or web address] or [phone] to request more information on [employer] Link access.    For providers and/or their staff who would like to refer a patient to [facility], please contact us through our one-stop-shop provider referral line, [facility], at [phone].    If you feel you have received this communication in error or would no longer like to receive these types of communications, please e-mail [email or web address]

## 2017-11-27 NOTE — PROGRESS NOTES
[facility] Hematology/Oncology  PROGRESS NOTE      Subjective:       Patient ID:   NAME: Catrachita Johnson : 1960     57 y.o. female    Referring Doc: Sridhar  Other Physicians: Marylin Gomez Eddington    Chief Complaint:  DCIS f/u    History of Present Illness:     Patient returns today for a regularly scheduled follow-up visit.  The patient saw Dr Gomez earlier today. He plans to get repeat mammogram in six months.The skin is healing post-XRT. She denies any CP,SOb, HA's or N/V.             ROS:   GEN: normal without any fever, night sweats or weight loss  HEENT: normal with no HA's, sore throat, stiff neck, changes in vision  CV: normal with no CP, SOB, PND, JACKSON or orthopnea  PULM: normal with no SOB, cough, hemoptysis, sputum or pleuritic pain  GI: normal with no abdominal pain, nausea, vomiting, constipation, diarrhea, melanotic stools, BRBPR, or hematemesis  : normal with no hematuria, dysuria  BREAST: normal with no mass, discharge, pain  SKIN: normal with no rash, erythema, bruising, or swelling    Allergies:  Review of patient's allergies indicates:   Allergen Reactions    Bactrim [sulfamethoxazole-trimethoprim]     Cherries     Tetracyclines        Medications:    Current Outpatient Prescriptions:     enalapril (VASOTEC) 10 MG tablet, TAKE 1 TABLET BY MOUTH EVERY DAY, Disp: 30 tablet, Rfl: 5    hydrocodone-acetaminophen 7.5-325mg (NORCO) 7.5-325 mg per tablet, TK 1 T PO Q 4 H PRN, Disp: , Rfl: 0    oxybutynin (DITROPAN) 5 MG Tab, Take 1 tablet (5 mg total) by mouth 3 (three) times daily., Disp: 90 tablet, Rfl: 11    silver sulfADIAZINE 1% (SILVADENE) 1 % cream, Apply topically 2 (two) times daily., Disp: 4 g, Rfl: 2    PMHx/PSHx Updates:  See patient's last visit with me on 2017.  See H&P on 2017        Pathology:  Ductal carcinoma in situ (DCIS) of left breast    Staging form: Onc Breast AJCC V7    - Clinical: Stage 0 (Tis (DCIS), N0, M0) - Signed by Arnie Gomez Jr., MD on  9/5/2017          Objective:     Vitals:  Blood pressure 137/76, pulse 81, temperature 98.3 °F (36.8 °C), resp. rate 18, weight 74.9 kg (165 lb 3.2 oz).    Physical Examination:   GEN: no apparent distress, comfortable; AAOx3  HEAD: atraumatic and normocephalic  EYES: no pallor, no icterus, PERRLA  ENT: OMM, no pharyngeal erythema, external ears WNL; no nasal discharge; no thrush  NECK: no masses, thyroid normal, trachea midline, no LAD/LN's, supple  CV: RRR with no murmur; normal pulse; normal S1 and S2; no pedal edema  CHEST: Normal respiratory effort; CTAB; normal breath sounds; no wheeze or crackles  ABDOM: nontender and nondistended; soft; normal bowel sounds; no rebound/guarding  MUSC/Skeletal: ROM normal; no crepitus; joints normal; no deformities or arthropathy  EXTREM: no clubbing, cyanosis, inflammation or swelling  SKIN: no rashes, lesions, ulcers, petechiae or subcutaneous nodules  : no simons  NEURO: grossly intact; motor/sensory WNL; AAOx3; no tremors  PSYCH: normal mood, affect and behavior  LYMPH: normal cervical, supraclavicular, axillary and groin LN's            Labs:     9/8/2017      Radiology/Diagnostic Studies:    No results found.    I have reviewed all available lab results and radiology reports.    Assessment/Plan:   (1) 57 y.o. female with diagnosis of DCIS who has been referred by Dr Coulter for oncology evaluation  - followed by Dr Waldo Padilla with GYN  - positive family history of breast cancer (2nd degree relatives)  - DCIS with no invasive component  - ER+/[place]+  - discussed the prognostics of DCIS especially the up to 25% risk of breast cancer development in patients who do not have resective surgery  - Dr Coulter with s/p lumpectomy on 9/13/2017; required radiation afterwards to provide equivocal survival benefit when compared to patient who have complete mastectomies  - I discussed the recommendation for postoperative radiation and she completed XRT on Nov 6th  - also antihormone  therapy with tamoxifen - I discussed the side-effect profile and the need to f/u with GYN yearly     (2) Irritable bowel syndrome     (3) HTN - followed by PCP      PLAN:  1. Script and consent for tamoxifen  2. F/u with PCP, GenSurg, Rad/onc, GYN   3. Rad/onc to get mammogram in 6 months  4. RTC in 1 month  Fax note to  Jean-Pierre Waller MD, Patricia Coulter [place]    Discussion:     I have explained all of the above in detail and the patient understands all of the current recommendation(s). I have answered all of their questions to the best of my ability and to their complete satisfaction.   The patient is to continue with the current management plan.            Electronically signed by Festus Walter MD

## 2017-11-27 NOTE — PROGRESS NOTES
Catrachita Johnson  [id number]  1960 11/27/2017  Festus Walter Md  [address]  [address]  [place] [address]    DIAGNOSIS: Ductal carcinoma in situ (DCIS) of left breast    Staging form: Onc Breast AJCC V7    - Clinical: Stage 0 (Tis (DCIS), N0, M0) - Signed by Arnie Gomez Jr., MD on 9/5/2017  REASON FOR VISIT: Routine scheduled follow-up.    HISTORY OF PRESENT ILLNESS:   57F with abnormal screening MMG @ L breast    *8/17 Dx MMG/US: microcalcifications at 12:00 L breast   Bx: g2-3 DCIS, solid and comedonecrosis; no invasion; ER/OR+    *Dr. Coulter: L axilla US wnl; pt to decide on lump+RT vs mastectomy  *Dr. Walter: lump+RT; adjuvant WINTERS    Lumpectomy revealed high-grade ductal carcinoma in situ that was estrogen receptor positive with evidence comedonecrosis, negative margins.    Patient completed adjuvant radiotherapy using hypo-fractionated regimen to 5050 cGy, ending November 2017.    INTERVAL HISTORY:   Since completion of therapy the patient has appreciated some sloughing of skin as well as continued discoloration of the left breast with minimal swelling. She has good range of motion left upper extremity without any swelling. She denies shortness of breath, chest pain, bone pain, fever, chills, weight changes. She reports her energy is presently about 75%    Review of Systems   Constitutional: Positive for fatigue. Negative for appetite change, chills, fever and unexpected weight change.   HENT:   Negative for lump/mass, mouth sores, sore throat, trouble swallowing and voice change.    Eyes: Negative for eye problems and icterus.   Respiratory: Negative for cough, hemoptysis and shortness of breath.    Cardiovascular: Negative for chest pain and leg swelling.   Gastrointestinal: Negative for abdominal pain, constipation, diarrhea, nausea and vomiting.   Genitourinary: Negative for dysuria, frequency, hematuria, nocturia and vaginal bleeding.    Musculoskeletal: Negative for back pain, gait  problem, neck pain and neck stiffness.   Skin: Positive for rash.   Neurological: Negative for extremity weakness, gait problem, headaches, numbness and seizures.   Hematological: Negative for adenopathy.     Past Medical History:   Diagnosis Date    Cancer     breast    Ductal carcinoma in situ (DCIS) of left breast 2017    Hypertension     Mass of left axilla      Past Surgical History:   Procedure Laterality Date    BREAST SURGERY Left 2017    Lumpectomy     SECTION      MYOMECTOMY       Social History     Social History    Marital status:      Spouse name: N/A    Number of children: N/A    Years of education: N/A     Social History Main Topics    Smoking status: Never Smoker    Smokeless tobacco: Never Used    Alcohol use No    Drug use: No    Sexual activity: Not on file     Other Topics Concern    Not on file     Social History Narrative    No narrative on file     Family History   Problem Relation Age of Onset    Stroke Mother     No Known Problems Father     Stroke Maternal Grandmother      Medication List with Changes/Refills   New Medications    TAMOXIFEN (NOLVADEX) 20 MG TAB    Take 1 tablet (20 mg total) by mouth once daily.   Current Medications    ENALAPRIL (VASOTEC) 10 MG TABLET    TAKE 1 TABLET BY MOUTH EVERY DAY    HYDROCODONE-ACETAMINOPHEN 7.5-325MG (NORCO) 7.5-325 MG PER TABLET    TK 1 T PO Q 4 H PRN    OXYBUTYNIN (DITROPAN) 5 MG TAB    Take 1 tablet (5 mg total) by mouth 3 (three) times daily.    SILVER SULFADIAZINE 1% (SILVADENE) 1 % CREAM    Apply topically 2 (two) times daily.     Review of patient's allergies indicates:   Allergen Reactions    Bactrim [sulfamethoxazole-trimethoprim]     Cherries     Tetracyclines        QUALITY OF LIFE: 90%- Able to Carry on Normal Activity: Minor Symptoms of Disease    Vitals:    17 1140   Weight: 74.6 kg (164 lb 8 oz)       PHYSICAL EXAM:   GENERAL: alert; in no apparent distress.   HEAD: normocephalic,  atraumatic.  EYES: pupils are equal, round, reactive to light and accommodation. Sclera anicteric. Conjunctiva not injected.   NOSE/THROAT: no nasal erythema or rhinorrhea. Oropharynx pink, without erythema, ulcerations or thrush.   NECK: no cervical motion rigidity; supple with no masses.  CHEST: Patient is speaking comfortably on room air with normal work of breathing without using accessory muscles of respiration.  ABDOMEN: soft, nontender, nondistended. Bowel sounds present.   MUSCULOSKELETAL: no tenderness to palpation along the spine or scapulae. Normal range of motion.  NEUROLOGIC: cranial nerves II-XII intact bilaterally. Strength 5/5 in bilateral upper and lower extremities. No sensory deficits appreciated.  Normal gait.  LYMPHATIC: no axillary adenopathy appreciated bilaterally.   EXTREMITIES: no clubbing, cyanosis, edema.  L BREAST: Hyperpigmentation with scattered sloughing and no evidence of desquamation. There is no nodularity, seroma, or signs of recurrent disease.    ANCILLARY DATA: none    ASSESSMENT: 57F with stage 0, [id number] g3 DCIS ER/ID+ of L breast s/p lumpectomy and adjuvant RT ending 11/17.  PLAN:   did very well with her radiation course and had expected side effect of radiation dermatitis with some fatigue. She is managing keep her self busy and reports her fatigue is correcting and she is using a naturopathic remedy for skin care. On today's examination her skin is healed very nicely and I told her to expect some sloughing of a hyperpigmented superficial layer. She has plans proceed with tamoxifen per Dr. Walter I recommended a mammogram in 6 months to order for her. Return to clinic in 6 months    All questions answered and contact information provided. Patient understands free to call us anytime with any questions or concerns regarding radiation therapy.    TIME SPENT WITH PATIENT: I have personally seen and evaluated this patient. Approximately 30 minutes were spent with  the patient discussing follow-up careplan.     PHYSICIAN: Arnie Gomez Jr, MD

## 2017-12-05 ENCOUNTER — OFFICE VISIT (OUTPATIENT)
Dept: FAMILY MEDICINE | Facility: CLINIC | Age: 57
End: 2017-12-05
Payer: COMMERCIAL

## 2017-12-05 VITALS
DIASTOLIC BLOOD PRESSURE: 80 MMHG | WEIGHT: 166 LBS | BODY MASS INDEX: 29.41 KG/M2 | HEART RATE: 76 BPM | HEIGHT: 63 IN | SYSTOLIC BLOOD PRESSURE: 134 MMHG | TEMPERATURE: 99 F | RESPIRATION RATE: 16 BRPM

## 2017-12-05 DIAGNOSIS — R31.9 HEMATURIA, UNSPECIFIED TYPE: Primary | ICD-10-CM

## 2017-12-05 DIAGNOSIS — N30.01 ACUTE CYSTITIS WITH HEMATURIA: ICD-10-CM

## 2017-12-05 DIAGNOSIS — N30.01 ACUTE HEMORRHAGIC CYSTITIS: ICD-10-CM

## 2017-12-05 LAB
BILIRUB SERPL-MCNC: ABNORMAL MG/DL
BLOOD URINE, POC: ABNORMAL
COLOR, POC UA: ABNORMAL
GLUCOSE UR QL STRIP: ABNORMAL
KETONES UR QL STRIP: ABNORMAL
LEUKOCYTE ESTERASE URINE, POC: ABNORMAL
NITRITE, POC UA: POSITIVE
PH, POC UA: 5
PROTEIN, POC: ABNORMAL
SPECIFIC GRAVITY, POC UA: 1.02
UROBILINOGEN, POC UA: 0.2

## 2017-12-05 PROCEDURE — 81001 URINALYSIS AUTO W/SCOPE: CPT | Mod: ,,, | Performed by: FAMILY MEDICINE

## 2017-12-05 PROCEDURE — 99213 OFFICE O/P EST LOW 20 MIN: CPT | Mod: 25,,, | Performed by: FAMILY MEDICINE

## 2017-12-05 RX ORDER — NITROFURANTOIN 25; 75 MG/1; MG/1
100 CAPSULE ORAL 2 TIMES DAILY
Qty: 20 CAPSULE | Refills: 0 | Status: SHIPPED | OUTPATIENT
Start: 2017-12-05 | End: 2017-12-15

## 2017-12-05 NOTE — PROGRESS NOTES
Subjective:       Patient ID: Catrachita Johnson is a 57 y.o. female.    Chief Complaint: Hematuria and Urinary Tract Infection    Hematuria   This is a new problem. Episode onset: 4-5 dd. The problem is unchanged. She describes the hematuria as gross hematuria. Associated symptoms include chills, fever (subjective) and nausea. Pertinent negatives include no flank pain or vomiting. Her past medical history is significant for hypertension. There is no history of  trauma, kidney stones, recent infection, sickle cell disease or STDs.   Urinary Tract Infection    Associated symptoms include chills, hematuria and nausea. Pertinent negatives include no flank pain or vomiting. There is no history of kidney stones or STD.     Review of Systems   Constitutional: Positive for chills and fever (subjective).   Gastrointestinal: Positive for nausea. Negative for vomiting.   Genitourinary: Positive for hematuria. Negative for flank pain.       Objective:      Physical Exam   Constitutional: She appears well-developed and well-nourished.   Cardiovascular: Normal rate, regular rhythm, normal heart sounds and intact distal pulses.  Exam reveals no gallop and no friction rub.    No murmur heard.  Pulmonary/Chest: Effort normal and breath sounds normal. No respiratory distress. She has no wheezes. She has no rales. She exhibits no tenderness.   Abdominal: Soft. Bowel sounds are normal. She exhibits no distension and no mass. There is no tenderness. There is no guarding.   Negative CVA tenderness     Urine dipstick shows positive for WBC's, positive for RBC's, positive for nitrates and positive for leukocytes.  Micro exam: not done.    Assessment:       1. Hematuria, unspecified type    2. Acute cystitis with hematuria    3. Acute hemorrhagic cystitis        Plan:       Catrachita was seen today for hematuria and urinary tract infection.    Diagnoses and all orders for this visit:    Hematuria, unspecified type  -     POCT URINALYSIS, WITH  MICRO, DIPSTICK OR TABLET REAGENT, AUTOMATED,  -     nitrofurantoin, macrocrystal-monohydrate, (MACROBID) 100 MG capsule; Take 1 capsule (100 mg total) by mouth 2 (two) times daily.    Acute cystitis with hematuria  -     POCT URINALYSIS, WITH MICRO, DIPSTICK OR TABLET REAGENT, AUTOMATED,  -     Urine culture  -     nitrofurantoin, macrocrystal-monohydrate, (MACROBID) 100 MG capsule; Take 1 capsule (100 mg total) by mouth 2 (two) times daily.    Acute hemorrhagic cystitis  -     POCT URINALYSIS, WITH MICRO, DIPSTICK OR TABLET REAGENT, AUTOMATED,  -     Urine culture  -     nitrofurantoin, macrocrystal-monohydrate, (MACROBID) 100 MG capsule; Take 1 capsule (100 mg total) by mouth 2 (two) times daily.         Return if symptoms worsen or fail to improve.

## 2017-12-06 ENCOUNTER — TELEPHONE (OUTPATIENT)
Dept: FAMILY MEDICINE | Facility: CLINIC | Age: 57
End: 2017-12-06

## 2017-12-28 ENCOUNTER — OFFICE VISIT (OUTPATIENT)
Dept: HEMATOLOGY/ONCOLOGY | Facility: CLINIC | Age: 57
End: 2017-12-28
Payer: COMMERCIAL

## 2017-12-28 VITALS
TEMPERATURE: 98 F | WEIGHT: 166.81 LBS | RESPIRATION RATE: 18 BRPM | BODY MASS INDEX: 29.55 KG/M2 | HEART RATE: 69 BPM | SYSTOLIC BLOOD PRESSURE: 141 MMHG | DIASTOLIC BLOOD PRESSURE: 84 MMHG

## 2017-12-28 DIAGNOSIS — Z79.810 USE OF TAMOXIFEN (NOLVADEX): ICD-10-CM

## 2017-12-28 DIAGNOSIS — D05.12 DUCTAL CARCINOMA IN SITU (DCIS) OF LEFT BREAST: Primary | ICD-10-CM

## 2017-12-28 PROCEDURE — 99214 OFFICE O/P EST MOD 30 MIN: CPT | Mod: ,,, | Performed by: INTERNAL MEDICINE

## 2017-12-28 NOTE — LETTER
December 28, 2017      Jean-Pierre Waller MD  [address] [facility] [address]           [facility]  [address]  [address]  [facility] [address]  Phone: [phone]  Fax: [phone]          Patient: Catrachita Johnson   MR Number: [id number]   YOB: 1960   Date of Visit: 12/28/2017       Dear Dr. Jean-Pierre Waller:    Thank you for referring Catrachita Johnson to me for evaluation. Attached you will find relevant portions of my assessment and plan of care.    If you have questions, please do not hesitate to call me. I look forward to following Catrachita Johnson along with you.    Sincerely,    Festus Walter MD    Enclosure  CC:  No Recipients    If you would like to receive this communication electronically, please contact [email or web address] or [phone] to request more information on [employer] Link access.    For providers and/or their staff who would like to refer a patient to [facility], please contact us through our one-stop-shop provider referral line, [facility], at [phone].    If you feel you have received this communication in error or would no longer like to receive these types of communications, please e-mail [email or web address]

## 2017-12-28 NOTE — PROGRESS NOTES
[facility] Hematology/Oncology  PROGRESS NOTE      Subjective:       Patient ID:   NAME: Catrachita Johnson : 1960     57 y.o. female    Referring Doc: Sridhar  Other Physicians: Marylin Gomez Eddington    Chief Complaint:  DCIS f/u    History of Present Illness:     Patient returns today for a regularly scheduled follow-up visit.  The patient saw Dr Gomez earlier today. He plans to get repeat mammogram in six months.The skin is healing post-XRT. She denies any CP,SOb, HA's or N/V. Some easy bruising on arms. She is on tamoxifen.             ROS:   GEN: normal without any fever, night sweats or weight loss  HEENT: normal with no HA's, sore throat, stiff neck, changes in vision  CV: normal with no CP, SOB, PND, JACKSON or orthopnea  PULM: normal with no SOB, cough, hemoptysis, sputum or pleuritic pain  GI: normal with no abdominal pain, nausea, vomiting, constipation, diarrhea, melanotic stools, BRBPR, or hematemesis  : normal with no hematuria, dysuria  BREAST: normal with no mass, discharge, pain  SKIN: normal with no rash, erythema, bruising, or swelling    Allergies:  Review of patient's allergies indicates:   Allergen Reactions    Bactrim [sulfamethoxazole-trimethoprim]     Corine     Tetracyclines        Medications:    Current Outpatient Prescriptions:     enalapril (VASOTEC) 10 MG tablet, TAKE 1 TABLET BY MOUTH EVERY DAY, Disp: 30 tablet, Rfl: 5    hydrocodone-acetaminophen 7.5-325mg (NORCO) 7.5-325 mg per tablet, TK 1 T PO Q 4 H PRN, Disp: , Rfl: 0    silver sulfADIAZINE 1% (SILVADENE) 1 % cream, Apply topically 2 (two) times daily., Disp: 4 g, Rfl: 2    tamoxifen (NOLVADEX) 20 MG Tab, Take 1 tablet (20 mg total) by mouth once daily., Disp: 30 tablet, Rfl: 4    PMHx/PSHx Updates:  See patient's last visit with me on 2017.  See H&P on 2017        Pathology:  Ductal carcinoma in situ (DCIS) of left breast    Staging form: Onc Breast AJCC V7    - Clinical: Stage 0 (Tis (DCIS), N0, M0) -  Signed by Arnie Gomez Jr., MD on 9/5/2017          Objective:     Vitals:  Blood pressure (!) 141/84, pulse 69, temperature 98.3 °F (36.8 °C), resp. rate 18, weight 75.7 kg (166 lb 12.8 oz).    Physical Examination:   GEN: no apparent distress, comfortable; AAOx3  HEAD: atraumatic and normocephalic  EYES: no pallor, no icterus, PERRLA  ENT: OMM, no pharyngeal erythema, external ears WNL; no nasal discharge; no thrush  NECK: no masses, thyroid normal, trachea midline, no LAD/LN's, supple  CV: RRR with no murmur; normal pulse; normal S1 and S2; no pedal edema  CHEST: Normal respiratory effort; CTAB; normal breath sounds; no wheeze or crackles  ABDOM: nontender and nondistended; soft; normal bowel sounds; no rebound/guarding  MUSC/Skeletal: ROM normal; no crepitus; joints normal; no deformities or arthropathy  EXTREM: no clubbing, cyanosis, inflammation or swelling  SKIN: no rashes, lesions, ulcers, petechiae or subcutaneous nodules  : no simons  NEURO: grossly intact; motor/sensory WNL; AAOx3; no tremors  PSYCH: normal mood, affect and behavior  LYMPH: normal cervical, supraclavicular, axillary and groin LN's            Labs:     pending      Radiology/Diagnostic Studies:    No results found.    I have reviewed all available lab results and radiology reports.    Assessment/Plan:   (1) 57 y.o. female with diagnosis of DCIS who has been referred by Dr Coulter for oncology evaluation  - followed by Dr Waldo Padilla with GYN  - positive family history of breast cancer (2nd degree relatives)  - DCIS with no invasive component  - ER+/CT+  - previously discussed the prognostics of DCIS especially the up to 25% risk of breast cancer development in patients who do not have resective surgery  - Dr Coulter with s/p lumpectomy on 9/13/2017; required radiation afterwards to provide equivocal survival benefit when compared to patient who have complete mastectomies  - I discussed the recommendation for postoperative radiation  and she completed XRT on Nov 6th  -  Previously discussed antihormone therapy with tamoxifen, and also discussed the side-effect profile and the need to f/u with GYN yearly     (2) Irritable bowel syndrome     (3) HTN - followed by PCP      PLAN:  1. Continue tamoxifen; check up to date labs  2. F/u with PCP, GenSurg, Rad/onc, GYN   3. Rad/onc to get mammogram in 6 months  4. RTC in 3 months  Fax note to  Jean-Pierre Waller MD, Patricia Coulter [place]    Discussion:     I have explained all of the above in detail and the patient understands all of the current recommendation(s). I have answered all of their questions to the best of my ability and to their complete satisfaction.   The patient is to continue with the current management plan.            Electronically signed by Festus Walter MD

## 2017-12-29 LAB
ALBUMIN SERPL-MCNC: 3.8 G/DL (ref 3.5–5.5)
ALBUMIN/GLOB SERPL: 1.2 {RATIO} (ref 1.2–2.2)
ALP SERPL-CCNC: 80 IU/L (ref 39–117)
ALT SERPL-CCNC: 10 IU/L (ref 0–32)
AMBIG ABBREV CMP 14 DEFAULT: NORMAL
AST SERPL-CCNC: 15 IU/L (ref 0–40)
BASOPHILS # BLD AUTO: 0 X10E3/UL (ref 0–0.2)
BASOPHILS NFR BLD AUTO: 0 %
BILIRUB SERPL-MCNC: <0.2 MG/DL (ref 0–1.2)
BUN SERPL-MCNC: 13 MG/DL (ref 6–24)
BUN/CREAT SERPL: 15 (ref 9–23)
CALCIUM SERPL-MCNC: 9.1 MG/DL (ref 8.7–10.2)
CHLORIDE SERPL-SCNC: 105 MMOL/L (ref 96–106)
CO2 SERPL-SCNC: 26 MMOL/L (ref 18–29)
CREAT SERPL-MCNC: 0.89 MG/DL (ref 0.57–1)
EGFR IF AFRICAN AMERICAN: 83 ML/MIN/1.73
EOSINOPHIL # BLD AUTO: 0.1 X10E3/UL (ref 0–0.4)
EOSINOPHIL NFR BLD AUTO: 1 %
ERYTHROCYTE [DISTWIDTH] IN BLOOD BY AUTOMATED COUNT: 14.6 % (ref 12.3–15.4)
EST. GFR  (NON AFRICAN AMERICAN): 72 ML/MIN/1.73
GLOBULIN SER CALC-MCNC: 3.1 G/DL (ref 1.5–4.5)
GLUCOSE SERPL-MCNC: 86 MG/DL (ref 65–99)
HCT VFR BLD AUTO: 34.7 % (ref 34–46.6)
HGB BLD-MCNC: 11.3 G/DL (ref 11.1–15.9)
IMM GRANULOCYTES # BLD: 0 X10E3/UL (ref 0–0.1)
IMM GRANULOCYTES NFR BLD: 0 %
LYMPHOCYTES # BLD AUTO: 1.3 X10E3/UL (ref 0.7–3.1)
LYMPHOCYTES NFR BLD AUTO: 27 %
MCH RBC QN AUTO: 28 PG (ref 26.6–33)
MCHC RBC AUTO-ENTMCNC: 32.6 G/DL (ref 31.5–35.7)
MCV RBC AUTO: 86 FL (ref 79–97)
MONOCYTES # BLD AUTO: 0.4 X10E3/UL (ref 0.1–0.9)
MONOCYTES NFR BLD AUTO: 8 %
NEUTROPHILS # BLD AUTO: 3.1 X10E3/UL (ref 1.4–7)
NEUTROPHILS NFR BLD AUTO: 64 %
PLATELET # BLD AUTO: 223 X10E3/UL (ref 150–379)
POTASSIUM SERPL-SCNC: 4.3 MMOL/L (ref 3.5–5.2)
PROT SERPL-MCNC: 6.9 G/DL (ref 6–8.5)
RBC # BLD AUTO: 4.03 X10E6/UL (ref 3.77–5.28)
SODIUM SERPL-SCNC: 144 MMOL/L (ref 134–144)
WBC # BLD AUTO: 4.9 X10E3/UL (ref 3.4–10.8)

## 2018-01-12 ENCOUNTER — TELEPHONE (OUTPATIENT)
Dept: HEMATOLOGY/ONCOLOGY | Facility: CLINIC | Age: 58
End: 2018-01-12

## 2018-01-12 NOTE — TELEPHONE ENCOUNTER
Spoke with pt, relayed 's message. Pt expressed understanding.      ----- Message from Festus Walter MD sent at 1/12/2018 12:31 PM CST -----  She needs to call her PCP. All she had was DCIS and she is on tamoxifen not chemotherapy.      ----- Message -----  From: Bonnie Trevino  Sent: 1/12/2018  11:57 AM  To: Festus Walter MD    Pt called in said her daughters pediatrician told her her daughter has the flu and that she should call her dr and let dr walter know because she is in remission and on cancel pills.    Call back # [phone]

## 2018-01-18 ENCOUNTER — OFFICE VISIT (OUTPATIENT)
Dept: FAMILY MEDICINE | Facility: CLINIC | Age: 58
End: 2018-01-18
Payer: COMMERCIAL

## 2018-01-18 VITALS
HEART RATE: 88 BPM | WEIGHT: 171 LBS | BODY MASS INDEX: 29.19 KG/M2 | SYSTOLIC BLOOD PRESSURE: 130 MMHG | DIASTOLIC BLOOD PRESSURE: 80 MMHG | HEIGHT: 64 IN | RESPIRATION RATE: 16 BRPM | TEMPERATURE: 98 F

## 2018-01-18 DIAGNOSIS — J06.9 UPPER RESPIRATORY TRACT INFECTION, UNSPECIFIED TYPE: Primary | ICD-10-CM

## 2018-01-18 DIAGNOSIS — J10.1 INFLUENZA B: ICD-10-CM

## 2018-01-18 LAB
CTP QC/QA: YES
FLUAV AG NPH QL: NEGATIVE
FLUBV AG NPH QL: POSITIVE

## 2018-01-18 PROCEDURE — 99213 OFFICE O/P EST LOW 20 MIN: CPT | Mod: 25,,, | Performed by: FAMILY MEDICINE

## 2018-01-18 PROCEDURE — 87804 INFLUENZA ASSAY W/OPTIC: CPT | Mod: 59,,, | Performed by: FAMILY MEDICINE

## 2018-01-18 RX ORDER — PROMETHAZINE HYDROCHLORIDE AND DEXTROMETHORPHAN HYDROBROMIDE 6.25; 15 MG/5ML; MG/5ML
5 SYRUP ORAL 4 TIMES DAILY
Qty: 180 ML | Refills: 2 | Status: SHIPPED | OUTPATIENT
Start: 2018-01-18 | End: 2018-01-28

## 2018-01-18 RX ORDER — AZITHROMYCIN 250 MG/1
250 TABLET, FILM COATED ORAL DAILY
Qty: 6 TABLET | Refills: 0 | Status: SHIPPED | OUTPATIENT
Start: 2018-01-18 | End: 2018-03-29 | Stop reason: SDUPTHER

## 2018-01-18 RX ORDER — OSELTAMIVIR PHOSPHATE 75 MG/1
75 CAPSULE ORAL 2 TIMES DAILY
Qty: 10 CAPSULE | Refills: 0 | Status: SHIPPED | OUTPATIENT
Start: 2018-01-18 | End: 2018-01-23

## 2018-01-18 NOTE — PROGRESS NOTES
Subjective:       Patient ID: Catrachita Johnson is a 57 y.o. female.    Chief Complaint: Nasal Congestion and Cough      Started Tuesday night with flu symptoms, no fever. 2-3 dd ago. Daughter had inf. B      Cough   This is a new problem. Episode onset: 2-3 dd. The problem has been gradually worsening. The problem occurs constantly. Associated symptoms include chills, ear congestion, nasal congestion, rhinorrhea and wheezing. Pertinent negatives include no chest pain, fever or headaches. Nothing aggravates the symptoms. She has tried nothing for the symptoms.       Allergies and Medications:   Review of patient's allergies indicates:   Allergen Reactions    Bactrim [sulfamethoxazole-trimethoprim]     Cherries     Tetracyclines      Current Outpatient Prescriptions   Medication Sig Dispense Refill    enalapril (VASOTEC) 10 MG tablet TAKE 1 TABLET BY MOUTH EVERY DAY 30 tablet 5    hydrocodone-acetaminophen 7.5-325mg (NORCO) 7.5-325 mg per tablet TK 1 T PO Q 4 H PRN  0    silver sulfADIAZINE 1% (SILVADENE) 1 % cream Apply topically 2 (two) times daily. 4 g 2    tamoxifen (NOLVADEX) 20 MG Tab Take 1 tablet (20 mg total) by mouth once daily. 30 tablet 4    azithromycin (Z-JOSE CARLOS) 250 MG tablet Take 1 tablet (250 mg total) by mouth once daily. po on day 1 then 1 tab po on days 2-5 6 tablet 0    oseltamivir (TAMIFLU) 75 MG capsule Take 1 capsule (75 mg total) by mouth 2 (two) times daily. 10 capsule 0    promethazine-dextromethorphan (PROMETHAZINE-DM) 6.25-15 mg/5 mL Syrp Take 5 mLs by mouth 4 (four) times daily. 180 mL 2     No current facility-administered medications for this visit.        Family History:   Family History   Problem Relation Age of Onset    Stroke Mother     No Known Problems Father     Stroke Maternal Grandmother        Social History:   Social History     Social History    Marital status:      Spouse name: N/A    Number of children: N/A    Years of education: N/A     Occupational  History    Not on file.     Social History Main Topics    Smoking status: Never Smoker    Smokeless tobacco: Never Used    Alcohol use No    Drug use: No    Sexual activity: Not on file     Other Topics Concern    Not on file     Social History Narrative    No narrative on file       Review of Systems   Constitutional: Positive for chills. Negative for fever.   HENT: Positive for rhinorrhea.    Respiratory: Positive for cough and wheezing.    Cardiovascular: Negative for chest pain.   Neurological: Negative for headaches.       Objective:     Vitals:    01/18/18 1029   BP: 130/80   Pulse: 88   Resp: 16   Temp: 98.4 °F (36.9 °C)        Physical Exam   Constitutional: She appears well-developed and well-nourished. No distress.   HENT:   Head: Normocephalic and atraumatic.   Right Ear: Hearing, tympanic membrane, external ear and ear canal normal. No drainage, swelling or tenderness. No foreign bodies. Tympanic membrane is not injected, not scarred, not perforated, not erythematous, not retracted and not bulging. Tympanic membrane mobility is normal. No middle ear effusion. No hemotympanum. No decreased hearing is noted.   Left Ear: Hearing, tympanic membrane, external ear and ear canal normal. No drainage, swelling or tenderness. No foreign bodies. Tympanic membrane is not injected, not scarred, not perforated, not erythematous, not retracted and not bulging. Tympanic membrane mobility is normal.  No middle ear effusion. No hemotympanum. No decreased hearing is noted.   Nose: Mucosal edema and rhinorrhea present. No nose lacerations, sinus tenderness, nasal deformity or septal deviation. Right sinus exhibits no maxillary sinus tenderness and no frontal sinus tenderness. Left sinus exhibits no maxillary sinus tenderness and no frontal sinus tenderness.   Mouth/Throat: Uvula is midline and oropharynx is clear and moist. Normal dentition. No oropharyngeal exudate, posterior oropharyngeal edema, posterior  oropharyngeal erythema or tonsillar abscesses.   Eyes: Conjunctivae and EOM are normal. Pupils are equal, round, and reactive to light. Right eye exhibits no discharge. Left eye exhibits no discharge. No scleral icterus.   Neck: Normal range of motion. Neck supple. No JVD present. No tracheal deviation present. No thyromegaly present.   Cardiovascular: Normal rate, regular rhythm, normal heart sounds and intact distal pulses.  Exam reveals no gallop and no friction rub.    No murmur heard.  Pulmonary/Chest: Effort normal and breath sounds normal. No stridor. No respiratory distress. She has no wheezes. She has no rales. She exhibits no tenderness.   Lymphadenopathy:     She has no cervical adenopathy.   Skin: She is not diaphoretic.   Nursing note and vitals reviewed.     Assessment:       1. Upper respiratory tract infection, unspecified type    2. Influenza B        Plan:       Catrachita was seen today for nasal congestion and cough.    Diagnoses and all orders for this visit:    Upper respiratory tract infection, unspecified type  -     POCT Influenza A/B  -     azithromycin (Z-JOSE CARLOS) 250 MG tablet; Take 1 tablet (250 mg total) by mouth once daily. po on day 1 then 1 tab po on days 2-5    Influenza B  -     oseltamivir (TAMIFLU) 75 MG capsule; Take 1 capsule (75 mg total) by mouth 2 (two) times daily.  -     promethazine-dextromethorphan (PROMETHAZINE-DM) 6.25-15 mg/5 mL Syrp; Take 5 mLs by mouth 4 (four) times daily.         No Follow-up on file.

## 2018-01-18 NOTE — PATIENT INSTRUCTIONS
Pocket Rx for Antibiotics:    Start Antibiotics  if :  1) infection lasting longer than 5 days  2) unilateral symptoms in the sinuses or unilateral nosebleed  3) fever greater than 100.4  4) cough or nasal discharge productive of blood

## 2018-01-29 RX ORDER — ENALAPRIL MALEATE 10 MG/1
TABLET ORAL
Qty: 30 TABLET | Refills: 0 | Status: SHIPPED | OUTPATIENT
Start: 2018-01-29 | End: 2018-02-27 | Stop reason: SDUPTHER

## 2018-02-27 RX ORDER — ENALAPRIL MALEATE 10 MG/1
TABLET ORAL
Qty: 30 TABLET | Refills: 11 | Status: SHIPPED | OUTPATIENT
Start: 2018-02-27 | End: 2019-02-20 | Stop reason: SDUPTHER

## 2018-03-29 ENCOUNTER — OFFICE VISIT (OUTPATIENT)
Dept: HEMATOLOGY/ONCOLOGY | Facility: CLINIC | Age: 58
End: 2018-03-29
Payer: COMMERCIAL

## 2018-03-29 VITALS
DIASTOLIC BLOOD PRESSURE: 73 MMHG | SYSTOLIC BLOOD PRESSURE: 135 MMHG | HEART RATE: 62 BPM | TEMPERATURE: 98 F | RESPIRATION RATE: 18 BRPM

## 2018-03-29 DIAGNOSIS — Z79.810 USE OF TAMOXIFEN (NOLVADEX): ICD-10-CM

## 2018-03-29 DIAGNOSIS — D05.12 DUCTAL CARCINOMA IN SITU (DCIS) OF LEFT BREAST: Primary | ICD-10-CM

## 2018-03-29 PROCEDURE — 99214 OFFICE O/P EST MOD 30 MIN: CPT | Mod: ,,, | Performed by: INTERNAL MEDICINE

## 2018-03-29 NOTE — LETTER
March 29, 2018      Jean-Pierre Waller MD  [address] [facility] [address]           [facility] Hematology Oncology  [address]  [address]  [facility] [address]  Phone: [phone]  Fax: [phone]          Patient: Catrachita Johnson   MR Number: [id number]   YOB: 1960   Date of Visit: 3/29/2018       Dear Dr. Jean-Pierre Waller:    Thank you for referring Catrachita Johnson to me for evaluation. Attached you will find relevant portions of my assessment and plan of care.    If you have questions, please do not hesitate to call me. I look forward to following Catrachita Johnson along with you.    Sincerely,    Festus Walter MD    Enclosure  CC:  No Recipients    If you would like to receive this communication electronically, please contact [email or web address] or [phone] to request more information on [employer] access.    For providers and/or their staff who would like to refer a patient to [facility], please contact us through our one-stop-shop provider referral line, [facility], at [phone].    If you feel you have received this communication in error or would no longer like to receive these types of communications, please e-mail [email or web address]

## 2018-03-29 NOTE — PROGRESS NOTES
[facility] Hematology/Oncology  PROGRESS NOTE      Subjective:       Patient ID:   NAME: Catrachita Johnson : 1960     57 y.o. female    Referring Doc: Sridhar  Other Physicians: Marylin Gomez Eddington    Chief Complaint:  DCIS f/u    History of Present Illness:     Patient returns today for a regularly scheduled follow-up visit.  The patient saw Dr Gomez earlier today. He plans to get repeat mammogram in 3 months.The skin is healing post-XRT and she has some hardening of the skin. She denies any CP,SOB, HA's or N/V. She is on tamoxifen and doing well with it. She is here by herself.             ROS:   GEN: normal without any fever, night sweats or weight loss  HEENT: normal with no HA's, sore throat, stiff neck, changes in vision  CV: normal with no CP, SOB, PND, JACKSON or orthopnea  PULM: normal with no SOB, cough, hemoptysis, sputum or pleuritic pain  GI: normal with no abdominal pain, nausea, vomiting, constipation, diarrhea, melanotic stools, BRBPR, or hematemesis  : normal with no hematuria, dysuria  BREAST: normal with no mass, discharge, pain  SKIN: normal with no rash, erythema, bruising, or swelling    Allergies:  Review of patient's allergies indicates:   Allergen Reactions    Bactrim [sulfamethoxazole-trimethoprim]     Cherries     Tetracyclines        Medications:    Current Outpatient Prescriptions:     enalapril (VASOTEC) 10 MG tablet, TAKE 1 TABLET BY MOUTH EVERY DAY, Disp: 30 tablet, Rfl: 11    hydrocodone-acetaminophen 7.5-325mg (NORCO) 7.5-325 mg per tablet, TK 1 T PO Q 4 H PRN, Disp: , Rfl: 0    silver sulfADIAZINE 1% (SILVADENE) 1 % cream, Apply topically 2 (two) times daily., Disp: 4 g, Rfl: 2    tamoxifen (NOLVADEX) 20 MG Tab, Take 1 tablet (20 mg total) by mouth once daily., Disp: 30 tablet, Rfl: 4    PMHx/PSHx Updates:  See patient's last visit with me on 2017.  See H&P on 2017        Pathology:  Cancer Staging  Ductal carcinoma in situ (DCIS) of left breast  Staging  form: Onc Breast AJCC V7  - Clinical: Stage 0 (Tis (DCIS), N0, M0) - Signed by Arnie Gomez Jr., MD on 9/5/2017            Objective:     Vitals:  Blood pressure 135/73, pulse 62, temperature 98.1 °F (36.7 °C), resp. rate 18.    Physical Examination:   GEN: no apparent distress, comfortable; AAOx3  HEAD: atraumatic and normocephalic  EYES: no pallor, no icterus, PERRLA  ENT: OMM, no pharyngeal erythema, external ears WNL; no nasal discharge; no thrush  NECK: no masses, thyroid normal, trachea midline, no LAD/LN's, supple  CV: RRR with no murmur; normal pulse; normal S1 and S2; no pedal edema  CHEST: Normal respiratory effort; CTAB; normal breath sounds; no wheeze or crackles  ABDOM: nontender and nondistended; soft; normal bowel sounds; no rebound/guarding  MUSC/Skeletal: ROM normal; no crepitus; joints normal; no deformities or arthropathy  EXTREM: no clubbing, cyanosis, inflammation or swelling  SKIN: no rashes, lesions, ulcers, petechiae or subcutaneous nodules  : no simons  NEURO: grossly intact; motor/sensory WNL; AAOx3; no tremors  PSYCH: normal mood, affect and behavior  LYMPH: normal cervical, supraclavicular, axillary and groin LN's  Breast: left breast looks good, no LAD, Masses etc          Labs:     12/28/2017 on chart      Radiology/Diagnostic Studies:    No results found.    I have reviewed all available lab results and radiology reports.    Assessment/Plan:   (1) 57 y.o. female with diagnosis of DCIS who has been referred by Dr Coulter for oncology evaluation  - followed by Dr Waldo Padilla with GYN  - positive family history of breast cancer (2nd degree relatives)  - DCIS with no invasive component  - ER+/[place]+  - previously discussed the prognostics of DCIS especially the up to 25% risk of breast cancer development in patients who do not have resective surgery  - Dr Coulter with s/p lumpectomy on 9/13/2017; required radiation afterwards to provide equivocal survival benefit when compared to  patient who have complete mastectomies  - I discussed the recommendation for postoperative radiation and she completed XRT on Nov 6th  -  Previously discussed antihormone therapy with tamoxifen, and also discussed the side-effect profile and the need to f/u with GYN yearly     (2) Irritable bowel syndrome     (3) HTN - followed by PCP      PLAN:  1. Continue tamoxifen; check up to date labs every 3 months  2. F/u with PCP, GenSurg, Rad/onc, GYN   3. Rad/onc to get mammogram in 3 months  4. RTC in 3 months  Fax note to  Jean-Pierre Waller MD, Patricia Coulter R. Eddington    Discussion:     I have explained all of the above in detail and the patient understands all of the current recommendation(s). I have answered all of their questions to the best of my ability and to their complete satisfaction.   The patient is to continue with the current management plan.            Electronically signed by Festus Walter MD

## 2018-04-19 DIAGNOSIS — D05.12 DUCTAL CARCINOMA IN SITU (DCIS) OF LEFT BREAST: ICD-10-CM

## 2018-04-19 RX ORDER — TAMOXIFEN CITRATE 20 MG/1
TABLET ORAL
Qty: 30 TABLET | Refills: 0 | Status: SHIPPED | OUTPATIENT
Start: 2018-04-19 | End: 2018-05-19 | Stop reason: SDUPTHER

## 2018-05-19 DIAGNOSIS — D05.12 DUCTAL CARCINOMA IN SITU (DCIS) OF LEFT BREAST: ICD-10-CM

## 2018-05-21 RX ORDER — TAMOXIFEN CITRATE 20 MG/1
TABLET ORAL
Qty: 30 TABLET | Refills: 0 | Status: SHIPPED | OUTPATIENT
Start: 2018-05-21 | End: 2018-06-21 | Stop reason: SDUPTHER

## 2018-06-21 DIAGNOSIS — D05.12 DUCTAL CARCINOMA IN SITU (DCIS) OF LEFT BREAST: ICD-10-CM

## 2018-06-21 RX ORDER — TAMOXIFEN CITRATE 20 MG/1
TABLET ORAL
Qty: 30 TABLET | Refills: 0 | Status: SHIPPED | OUTPATIENT
Start: 2018-06-21 | End: 2018-07-19 | Stop reason: SDUPTHER

## 2018-06-28 ENCOUNTER — OFFICE VISIT (OUTPATIENT)
Dept: HEMATOLOGY/ONCOLOGY | Facility: CLINIC | Age: 58
End: 2018-06-28
Payer: COMMERCIAL

## 2018-06-28 VITALS
HEART RATE: 75 BPM | DIASTOLIC BLOOD PRESSURE: 75 MMHG | WEIGHT: 171 LBS | BODY MASS INDEX: 29.82 KG/M2 | RESPIRATION RATE: 18 BRPM | SYSTOLIC BLOOD PRESSURE: 113 MMHG | TEMPERATURE: 99 F

## 2018-06-28 DIAGNOSIS — D05.12 DUCTAL CARCINOMA IN SITU (DCIS) OF LEFT BREAST: Primary | ICD-10-CM

## 2018-06-28 DIAGNOSIS — Z79.810 USE OF TAMOXIFEN (NOLVADEX): ICD-10-CM

## 2018-06-28 PROCEDURE — 99213 OFFICE O/P EST LOW 20 MIN: CPT | Mod: ,,, | Performed by: INTERNAL MEDICINE

## 2018-06-28 PROCEDURE — 3008F BODY MASS INDEX DOCD: CPT | Mod: ,,, | Performed by: INTERNAL MEDICINE

## 2018-06-28 NOTE — LETTER
June 28, 2018      Jean-Pierre Waller MD  [address] [place]  [place] [address]           [facility] - Hematology Oncology  [address]  [address]  [place] [address]  Phone: [phone]  Fax: [phone]          Patient: Catrachita Johnson   MR Number: [id number]   YOB: 1960   Date of Visit: 6/28/2018       Dear Dr. Jean-Pierre Waller:    Thank you for referring Catrachita Johnson to me for evaluation. Attached you will find relevant portions of my assessment and plan of care.    If you have questions, please do not hesitate to call me. I look forward to following Catrachita Johnson along with you.    Sincerely,    Festus Walter MD    Enclosure  CC:  No Recipients    If you would like to receive this communication electronically, please contact [email or web address] or [phone] to request more information on [employer] access.    For providers and/or their staff who would like to refer a patient to [facility], please contact us through our one-stop-shop provider referral line, [facility], at [phone].    If you feel you have received this communication in error or would no longer like to receive these types of communications, please e-mail [email or web address]

## 2018-06-28 NOTE — PROGRESS NOTES
[facility] Hematology/Oncology  PROGRESS NOTE      Subjective:       Patient ID:   NAME: Catrachita Johnson : 1960     57 y.o. female    Referring Doc: Sridhar  Other Physicians: Marylin Gomez Eddington    Chief Complaint:  DCIS f/u    History of Present Illness:     Patient returns today for a regularly scheduled follow-up visit.  The patient is seeing GYN on  with expected mammogram to be scheduled by them.The skin is healed well post-XRT and she says it is fine. She denies any CP,SOB, HA's or N/V. She is on tamoxifen and doing well with it. She is here by herself.             ROS:   GEN: normal without any fever, night sweats or weight loss  HEENT: normal with no HA's, sore throat, stiff neck, changes in vision  CV: normal with no CP, SOB, PND, JACKSON or orthopnea  PULM: normal with no SOB, cough, hemoptysis, sputum or pleuritic pain  GI: normal with no abdominal pain, nausea, vomiting, constipation, diarrhea, melanotic stools, BRBPR, or hematemesis  : normal with no hematuria, dysuria  BREAST: normal with no mass, discharge, pain  SKIN: normal with no rash, erythema, bruising, or swelling    Allergies:  Review of patient's allergies indicates:   Allergen Reactions    Bactrim [sulfamethoxazole-trimethoprim]     Cherries     Tetracyclines        Medications:    Current Outpatient Prescriptions:     enalapril (VASOTEC) 10 MG tablet, TAKE 1 TABLET BY MOUTH EVERY DAY, Disp: 30 tablet, Rfl: 11    hydrocodone-acetaminophen 7.5-325mg (NORCO) 7.5-325 mg per tablet, TK 1 T PO Q 4 H PRN, Disp: , Rfl: 0    silver sulfADIAZINE 1% (SILVADENE) 1 % cream, Apply topically 2 (two) times daily., Disp: 4 g, Rfl: 2    tamoxifen (NOLVADEX) 20 MG Tab, TAKE 1 TABLET(20 MG) BY MOUTH EVERY DAY, Disp: 30 tablet, Rfl: 0    PMHx/PSHx Updates:  See patient's last visit with me on 3/29/2018See H&P on 2017        Pathology:  Cancer Staging  Ductal carcinoma in situ (DCIS) of left breast  Staging form: Onc Breast AJCC V7  -  Clinical: Stage 0 (Tis (DCIS), N0, M0) - Signed by Arnie Gomez Jr., MD on 9/5/2017          Objective:     Vitals:  Blood pressure 113/75, pulse 75, temperature 98.6 °F (37 °C), resp. rate 18, weight 77.6 kg (171 lb).    Physical Examination:   GEN: no apparent distress, comfortable; AAOx3  HEAD: atraumatic and normocephalic  EYES: no pallor, no icterus, PERRLA  ENT: OMM, no pharyngeal erythema, external ears WNL; no nasal discharge; no thrush  NECK: no masses, thyroid normal, trachea midline, no LAD/LN's, supple  CV: RRR with no murmur; normal pulse; normal S1 and S2; no pedal edema  CHEST: Normal respiratory effort; CTAB; normal breath sounds; no wheeze or crackles  ABDOM: nontender and nondistended; soft; normal bowel sounds; no rebound/guarding  MUSC/Skeletal: ROM normal; no crepitus; joints normal; no deformities or arthropathy  EXTREM: no clubbing, cyanosis, inflammation or swelling  SKIN: no rashes, lesions, ulcers, petechiae or subcutaneous nodules  : no simons  NEURO: grossly intact; motor/sensory WNL; AAOx3; no tremors  PSYCH: normal mood, affect and behavior  LYMPH: normal cervical, supraclavicular, axillary and groin LN's  Breast: left breast looks good, no LAD, Masses etc          Labs:     12/28/2017 on chart      Radiology/Diagnostic Studies:    No results found.    I have reviewed all available lab results and radiology reports.    Assessment/Plan:   (1) 57 y.o. female with diagnosis of DCIS who has been referred by Dr Coulter for oncology evaluation  - followed by Dr Waldo Padilla with GYN  - positive family history of breast cancer (2nd degree relatives)  - DCIS with no invasive component  - ER+/[place]+  - previously discussed the prognostics of DCIS especially the up to 25% risk of breast cancer development in patients who do not have resective surgery  - Dr Coulter with s/p lumpectomy on 9/13/2017; required radiation afterwards to provide equivocal survival benefit when compared to patient who  have complete mastectomies  - I discussed the recommendation for postoperative radiation and she completed XRT on Nov 6th  -  Previously discussed antihormone therapy with tamoxifen, and also discussed the side-effect profile and the need to f/u with GYN yearly     (2) Irritable bowel syndrome     (3) HTN - followed by PCP      1. Ductal carcinoma in situ (DCIS) of left breast     2. Use of tamoxifen (Nolvadex)           PLAN:  1. Continue tamoxifen; check up to date labs every 3 months  2. F/u with PCP, GenSurg, Rad/onc, GYN   3. To see GYN for them to schedule mammogram  4. RTC in 4 months  Fax note to  Jean-Pierre Waller MD, Patricia Coulter R. Eddington    Discussion:     I have explained all of the above in detail and the patient understands all of the current recommendation(s). I have answered all of their questions to the best of my ability and to their complete satisfaction.   The patient is to continue with the current management plan.            Electronically signed by Festus Walter MD

## 2018-06-29 LAB
ALBUMIN SERPL-MCNC: 3.9 G/DL (ref 3.6–5.1)
ALBUMIN/GLOB SERPL: 1.4 (CALC) (ref 1–2.5)
ALP SERPL-CCNC: 61 U/L (ref 33–130)
ALT SERPL-CCNC: 13 U/L (ref 6–29)
AST SERPL-CCNC: 17 U/L (ref 10–35)
BASOPHILS # BLD AUTO: 22 CELLS/UL (ref 0–200)
BASOPHILS NFR BLD AUTO: 0.5 %
BILIRUB SERPL-MCNC: 0.3 MG/DL (ref 0.2–1.2)
BUN SERPL-MCNC: 13 MG/DL (ref 7–25)
BUN/CREAT SERPL: ABNORMAL (CALC) (ref 6–22)
CALCIUM SERPL-MCNC: 9.1 MG/DL (ref 8.6–10.4)
CHLORIDE SERPL-SCNC: 108 MMOL/L (ref 98–110)
CO2 SERPL-SCNC: 32 MMOL/L (ref 20–31)
CREAT SERPL-MCNC: 0.76 MG/DL (ref 0.5–1.05)
EOSINOPHIL # BLD AUTO: 30 CELLS/UL (ref 15–500)
EOSINOPHIL NFR BLD AUTO: 0.7 %
ERYTHROCYTE [DISTWIDTH] IN BLOOD BY AUTOMATED COUNT: 13.2 % (ref 11–15)
GFR SERPL CREATININE-BSD FRML MDRD: 86 ML/MIN/1.73M2
GLOBULIN SER CALC-MCNC: 2.7 G/DL (CALC) (ref 1.9–3.7)
GLUCOSE SERPL-MCNC: 92 MG/DL (ref 65–139)
HCT VFR BLD AUTO: 33.4 % (ref 35–45)
HGB BLD-MCNC: 11.4 G/DL (ref 11.7–15.5)
LYMPHOCYTES # BLD AUTO: 886 CELLS/UL (ref 850–3900)
LYMPHOCYTES NFR BLD AUTO: 20.6 %
MCH RBC QN AUTO: 30 PG (ref 27–33)
MCHC RBC AUTO-ENTMCNC: 34.1 G/DL (ref 32–36)
MCV RBC AUTO: 87.9 FL (ref 80–100)
MONOCYTES # BLD AUTO: 237 CELLS/UL (ref 200–950)
MONOCYTES NFR BLD AUTO: 5.5 %
NEUTROPHILS # BLD AUTO: 3126 CELLS/UL (ref 1500–7800)
NEUTROPHILS NFR BLD AUTO: 72.7 %
PLATELET # BLD AUTO: 207 THOUSAND/UL (ref 140–400)
PMV BLD REES-ECKER: 9.8 FL (ref 7.5–12.5)
POTASSIUM SERPL-SCNC: 4.2 MMOL/L (ref 3.5–5.3)
PROT SERPL-MCNC: 6.6 G/DL (ref 6.1–8.1)
RBC # BLD AUTO: 3.8 MILLION/UL (ref 3.8–5.1)
SODIUM SERPL-SCNC: 143 MMOL/L (ref 135–146)
WBC # BLD AUTO: 4.3 THOUSAND/UL (ref 3.8–10.8)

## 2018-07-19 DIAGNOSIS — D05.12 DUCTAL CARCINOMA IN SITU (DCIS) OF LEFT BREAST: ICD-10-CM

## 2018-07-19 RX ORDER — TAMOXIFEN CITRATE 20 MG/1
TABLET ORAL
Qty: 30 TABLET | Refills: 0 | Status: SHIPPED | OUTPATIENT
Start: 2018-07-19 | End: 2018-08-23 | Stop reason: SDUPTHER

## 2018-07-24 ENCOUNTER — TELEPHONE (OUTPATIENT)
Dept: HEMATOLOGY/ONCOLOGY | Facility: CLINIC | Age: 58
End: 2018-07-24

## 2018-07-24 NOTE — TELEPHONE ENCOUNTER
----- Message from Festus Walter MD sent at 7/23/2018  8:14 AM CDT -----  Call her with good report      ----- Message -----  From: Ngozi Owens  Sent: 7/20/2018   1:15 PM  To: Festus Walter MD    Screening mammogram 7/20/18

## 2018-08-23 DIAGNOSIS — D05.12 DUCTAL CARCINOMA IN SITU (DCIS) OF LEFT BREAST: ICD-10-CM

## 2018-08-23 RX ORDER — TAMOXIFEN CITRATE 20 MG/1
TABLET ORAL
Qty: 30 TABLET | Refills: 0 | Status: SHIPPED | OUTPATIENT
Start: 2018-08-23 | End: 2018-09-20 | Stop reason: SDUPTHER

## 2018-09-20 DIAGNOSIS — D05.12 DUCTAL CARCINOMA IN SITU (DCIS) OF LEFT BREAST: ICD-10-CM

## 2018-09-20 RX ORDER — TAMOXIFEN CITRATE 20 MG/1
TABLET ORAL
Qty: 30 TABLET | Refills: 0 | Status: SHIPPED | OUTPATIENT
Start: 2018-09-20 | End: 2018-10-21 | Stop reason: SDUPTHER

## 2018-10-21 DIAGNOSIS — D05.12 DUCTAL CARCINOMA IN SITU (DCIS) OF LEFT BREAST: ICD-10-CM

## 2018-10-22 ENCOUNTER — TELEPHONE (OUTPATIENT)
Dept: HEMATOLOGY/ONCOLOGY | Facility: CLINIC | Age: 58
End: 2018-10-22

## 2018-10-22 RX ORDER — TAMOXIFEN CITRATE 20 MG/1
TABLET ORAL
Qty: 30 TABLET | Refills: 0 | Status: SHIPPED | OUTPATIENT
Start: 2018-10-22 | End: 2018-11-19 | Stop reason: SDUPTHER

## 2018-10-22 NOTE — PROGRESS NOTES
[facility] Hematology/Oncology  PROGRESS NOTE      Subjective:       Patient ID:   NAME: Catrachita Johnson : 1960     58 y.o. female    Referring Doc: Sridhar  Other Physicians: Marylin Gomez Eddington    Chief Complaint:  DCIS f/u    History of Present Illness:     Patient returns today for a regularly scheduled follow-up visit.  She saw her GYN this past July. She denies any CP,SOB, HA's or N/V. She is on tamoxifen and doing well with it. She is here by herself. She had mammogram done at [facility] this past summer.             ROS:   GEN: normal without any fever, night sweats or weight loss  HEENT: normal with no HA's, sore throat, stiff neck, changes in vision  CV: normal with no CP, SOB, PND, JACKSON or orthopnea  PULM: normal with no SOB, cough, hemoptysis, sputum or pleuritic pain  GI: normal with no abdominal pain, nausea, vomiting, constipation, diarrhea, melanotic stools, BRBPR, or hematemesis  : normal with no hematuria, dysuria  BREAST: normal with no mass, discharge, pain  SKIN: normal with no rash, erythema, bruising, or swelling    Allergies:  Review of patient's allergies indicates:   Allergen Reactions    Bactrim [sulfamethoxazole-trimethoprim]     Cherries     Tetracyclines        Medications:    Current Outpatient Medications:     enalapril (VASOTEC) 10 MG tablet, TAKE 1 TABLET BY MOUTH EVERY DAY, Disp: 30 tablet, Rfl: 11    hydrocodone-acetaminophen 7.5-325mg (NORCO) 7.5-325 mg per tablet, TK 1 T PO Q 4 H PRN, Disp: , Rfl: 0    silver sulfADIAZINE 1% (SILVADENE) 1 % cream, Apply topically 2 (two) times daily., Disp: 4 g, Rfl: 2    tamoxifen (NOLVADEX) 20 MG Tab, TAKE 1 TABLET(20 MG) BY MOUTH EVERY DAY, Disp: 30 tablet, Rfl: 0    PMHx/PSHx Updates:  See patient's last visit with me on 2018  See H&P on 2017        Pathology:  Cancer Staging  Ductal carcinoma in situ (DCIS) of left breast  Staging form: Onc Breast AJCC V7  - Clinical: Stage 0 (Tis (DCIS), N0, M0) - Signed by Arnie  Patricia Oscar MD on 9/5/2017          Objective:     Vitals:  Blood pressure 120/80, pulse 73, temperature 98.4 °F (36.9 °C), resp. rate 20, weight 78.1 kg (172 lb 1.6 oz).    Physical Examination:   GEN: no apparent distress, comfortable; AAOx3  HEAD: atraumatic and normocephalic  EYES: no pallor, no icterus, PERRLA  ENT: OMM, no pharyngeal erythema, external ears WNL; no nasal discharge; no thrush  NECK: no masses, thyroid normal, trachea midline, no LAD/LN's, supple  CV: RRR with no murmur; normal pulse; normal S1 and S2; no pedal edema  CHEST: Normal respiratory effort; CTAB; normal breath sounds; no wheeze or crackles  ABDOM: nontender and nondistended; soft; normal bowel sounds; no rebound/guarding  MUSC/Skeletal: ROM normal; no crepitus; joints normal; no deformities or arthropathy  EXTREM: no clubbing, cyanosis, inflammation or swelling  SKIN: no rashes, lesions, ulcers, petechiae or subcutaneous nodules  : no simons  NEURO: grossly intact; motor/sensory WNL; AAOx3; no tremors  PSYCH: normal mood, affect and behavior  LYMPH: normal cervical, supraclavicular, axillary and groin LN's  Breast: left breast looks good, no LAD, Masses etc          Labs:     6/28/2018   Lab Results   Component Value Date    WBC 4.3 06/28/2018    HGB 11.4 (L) 06/28/2018    HCT 33.4 (L) 06/28/2018    MCV 87.9 06/28/2018     06/28/2018     BMP  Lab Results   Component Value Date     06/28/2018    K 4.2 06/28/2018     06/28/2018    CO2 32 (H) 06/28/2018    BUN 13 06/28/2018    CREATININE 0.76 06/28/2018    CALCIUM 9.1 06/28/2018    ESTGFRAFRICA 100 06/28/2018    EGFRNONAA 86 06/28/2018     Lab Results   Component Value Date    ALT 13 06/28/2018    AST 17 06/28/2018    ALKPHOS 61 06/28/2018    BILITOT 0.3 06/28/2018           Radiology/Diagnostic Studies:    No results found.    I have reviewed all available lab results and radiology reports.    Assessment/Plan:   (1) 57 y.o. female with diagnosis of DCIS who has been  referred by Dr Coulter for oncology evaluation  - followed by Dr Waldo Padilla with GYN  - positive family history of breast cancer (2nd degree relatives)  - DCIS with no invasive component  - ER+/[place]+  - previously discussed the prognostics of DCIS especially the up to 25% risk of breast cancer development in patients who do not have resective surgery  - Dr Coulter with s/p lumpectomy on 9/13/2017; required radiation afterwards to provide equivocal survival benefit when compared to patient who have complete mastectomies  - I discussed the recommendation for postoperative radiation and she completed XRT on Nov 6th  -  Previously discussed antihormone therapy with tamoxifen, and also discussed the side-effect profile and the need to f/u with GYN yearly     (2) Irritable bowel syndrome     (3) HTN - followed by PCP      1. Ductal carcinoma in situ (DCIS) of left breast     2. Use of tamoxifen (Nolvadex)           PLAN:  1. Continue tamoxifen; check up to date labs every 3 months  2. F/u with PCP, GenSurg, Rad/onc, GYN   3. Need latest mammogram report from [facility]  4. RTC in 4 months  Fax note to  Jean-Pierre Waller MD, Patricia Coulter R. Eddington    Discussion:     I have explained all of the above in detail and the patient understands all of the current recommendation(s). I have answered all of their questions to the best of my ability and to their complete satisfaction.   The patient is to continue with the current management plan.            Electronically signed by Festus Walter MD

## 2018-10-23 ENCOUNTER — OFFICE VISIT (OUTPATIENT)
Dept: HEMATOLOGY/ONCOLOGY | Facility: CLINIC | Age: 58
End: 2018-10-23
Payer: COMMERCIAL

## 2018-10-23 VITALS
BODY MASS INDEX: 30.01 KG/M2 | TEMPERATURE: 98 F | DIASTOLIC BLOOD PRESSURE: 80 MMHG | SYSTOLIC BLOOD PRESSURE: 120 MMHG | WEIGHT: 172.13 LBS | HEART RATE: 73 BPM | RESPIRATION RATE: 20 BRPM

## 2018-10-23 DIAGNOSIS — D05.12 DUCTAL CARCINOMA IN SITU (DCIS) OF LEFT BREAST: Primary | ICD-10-CM

## 2018-10-23 DIAGNOSIS — Z79.810 USE OF TAMOXIFEN (NOLVADEX): ICD-10-CM

## 2018-10-23 PROCEDURE — 99214 OFFICE O/P EST MOD 30 MIN: CPT | Mod: ,,, | Performed by: INTERNAL MEDICINE

## 2018-10-23 PROCEDURE — 3008F BODY MASS INDEX DOCD: CPT | Mod: ,,, | Performed by: INTERNAL MEDICINE

## 2018-10-23 NOTE — LETTER
October 23, 2018      Jean-Pierre Waller MD  [address]  [address]  [place] [address]           [facility] Hematology [facility]  [address]  [address]  [place] [address]  Phone: [phone]  Fax: [phone]          Patient: Catrachita Johnson   MR Number: [id number]   YOB: 1960   Date of Visit: 10/23/2018       Dear Dr. Jean-Pierre Waller:    Thank you for referring Catrachita Johnson to me for evaluation. Attached you will find relevant portions of my assessment and plan of care.    If you have questions, please do not hesitate to call me. I look forward to following Catrachita Johnson along with you.    Sincerely,    Festus Walter MD    Enclosure  CC:  No Recipients    If you would like to receive this communication electronically, please contact [email or web address] or [phone] to request more information on "[employer] Link access.    For providers and/or their staff who would like to refer a patient to [facility], please contact us through our one-stop-shop provider referral line, [facility], at [phone].    If you feel you have received this communication in error or would no longer like to receive these types of communications, please e-mail [email or web address]

## 2018-10-24 ENCOUNTER — TELEPHONE (OUTPATIENT)
Dept: HEMATOLOGY/ONCOLOGY | Facility: CLINIC | Age: 58
End: 2018-10-24

## 2018-10-24 LAB
ALBUMIN SERPL-MCNC: 3.9 G/DL (ref 3.6–5.1)
ALBUMIN/GLOB SERPL: 1.3 (CALC) (ref 1–2.5)
ALP SERPL-CCNC: 65 U/L (ref 33–130)
ALT SERPL-CCNC: 13 U/L (ref 6–29)
AST SERPL-CCNC: 17 U/L (ref 10–35)
BASOPHILS # BLD AUTO: 22 CELLS/UL (ref 0–200)
BASOPHILS NFR BLD AUTO: 0.4 %
BILIRUB SERPL-MCNC: 0.2 MG/DL (ref 0.2–1.2)
BUN SERPL-MCNC: 13 MG/DL (ref 7–25)
BUN/CREAT SERPL: NORMAL (CALC) (ref 6–22)
CALCIUM SERPL-MCNC: 9.2 MG/DL (ref 8.6–10.4)
CHLORIDE SERPL-SCNC: 104 MMOL/L (ref 98–110)
CO2 SERPL-SCNC: 32 MMOL/L (ref 20–32)
CREAT SERPL-MCNC: 0.74 MG/DL (ref 0.5–1.05)
EOSINOPHIL # BLD AUTO: 39 CELLS/UL (ref 15–500)
EOSINOPHIL NFR BLD AUTO: 0.7 %
ERYTHROCYTE [DISTWIDTH] IN BLOOD BY AUTOMATED COUNT: 12.8 % (ref 11–15)
GFR SERPL CREATININE-BSD FRML MDRD: 89 ML/MIN/1.73M2
GLOBULIN SER CALC-MCNC: 3 G/DL (CALC) (ref 1.9–3.7)
GLUCOSE SERPL-MCNC: 82 MG/DL (ref 65–139)
HCT VFR BLD AUTO: 34.4 % (ref 35–45)
HGB BLD-MCNC: 11.4 G/DL (ref 11.7–15.5)
LYMPHOCYTES # BLD AUTO: 1056 CELLS/UL (ref 850–3900)
LYMPHOCYTES NFR BLD AUTO: 19.2 %
MCH RBC QN AUTO: 29.4 PG (ref 27–33)
MCHC RBC AUTO-ENTMCNC: 33.1 G/DL (ref 32–36)
MCV RBC AUTO: 88.7 FL (ref 80–100)
MONOCYTES # BLD AUTO: 341 CELLS/UL (ref 200–950)
MONOCYTES NFR BLD AUTO: 6.2 %
NEUTROPHILS # BLD AUTO: 4043 CELLS/UL (ref 1500–7800)
NEUTROPHILS NFR BLD AUTO: 73.5 %
PLATELET # BLD AUTO: 224 THOUSAND/UL (ref 140–400)
PMV BLD REES-ECKER: 10.1 FL (ref 7.5–12.5)
POTASSIUM SERPL-SCNC: 3.9 MMOL/L (ref 3.5–5.3)
PROT SERPL-MCNC: 6.9 G/DL (ref 6.1–8.1)
RBC # BLD AUTO: 3.88 MILLION/UL (ref 3.8–5.1)
SODIUM SERPL-SCNC: 140 MMOL/L (ref 135–146)
WBC # BLD AUTO: 5.5 THOUSAND/UL (ref 3.8–10.8)

## 2018-11-19 DIAGNOSIS — D05.12 DUCTAL CARCINOMA IN SITU (DCIS) OF LEFT BREAST: ICD-10-CM

## 2018-11-19 RX ORDER — TAMOXIFEN CITRATE 20 MG/1
TABLET ORAL
Qty: 30 TABLET | Refills: 0 | Status: SHIPPED | OUTPATIENT
Start: 2018-11-19 | End: 2018-12-20 | Stop reason: SDUPTHER

## 2018-11-27 ENCOUNTER — OFFICE VISIT (OUTPATIENT)
Dept: FAMILY MEDICINE | Facility: CLINIC | Age: 58
End: 2018-11-27
Payer: COMMERCIAL

## 2018-11-27 VITALS
HEIGHT: 63 IN | DIASTOLIC BLOOD PRESSURE: 78 MMHG | TEMPERATURE: 99 F | OXYGEN SATURATION: 97 % | BODY MASS INDEX: 30.48 KG/M2 | HEART RATE: 74 BPM | WEIGHT: 172 LBS | SYSTOLIC BLOOD PRESSURE: 130 MMHG

## 2018-11-27 DIAGNOSIS — J01.40 ACUTE NON-RECURRENT PANSINUSITIS: Primary | ICD-10-CM

## 2018-11-27 LAB
CTP QC/QA: YES
FLUAV AG NPH QL: NEGATIVE
FLUBV AG NPH QL: NEGATIVE

## 2018-11-27 PROCEDURE — 3008F BODY MASS INDEX DOCD: CPT | Mod: ,,, | Performed by: FAMILY MEDICINE

## 2018-11-27 PROCEDURE — 99213 OFFICE O/P EST LOW 20 MIN: CPT | Mod: 25,,, | Performed by: FAMILY MEDICINE

## 2018-11-27 PROCEDURE — 87804 INFLUENZA ASSAY W/OPTIC: CPT | Mod: 59,QW,, | Performed by: FAMILY MEDICINE

## 2018-11-27 RX ORDER — AZITHROMYCIN 250 MG/1
250 TABLET, FILM COATED ORAL DAILY
Qty: 6 TABLET | Refills: 0 | Status: SHIPPED | OUTPATIENT
Start: 2018-11-27 | End: 2018-12-03

## 2018-11-27 RX ORDER — PROMETHAZINE HYDROCHLORIDE AND DEXTROMETHORPHAN HYDROBROMIDE 6.25; 15 MG/5ML; MG/5ML
10 SYRUP ORAL NIGHTLY
Qty: 180 ML | Refills: 2 | Status: SHIPPED | OUTPATIENT
Start: 2018-11-27 | End: 2018-12-07

## 2018-11-27 NOTE — PROGRESS NOTES
Subjective:       Patient ID: Catrachita Johnson is a 58 y.o. female.    Chief Complaint: Cough; Sore Throat; Headache; and Generalized Body Aches      4 dd      Cough   This is a new problem. Episode onset: 4dd. The problem has been gradually worsening. The problem occurs constantly. The cough is productive of sputum, productive of purulent sputum and productive of blood-tinged sputum. Associated symptoms include nasal congestion. She has tried OTC cough suppressant for the symptoms. The treatment provided mild relief. Her past medical history is significant for bronchitis (20 years ago).       Allergies and Medications:   Review of patient's allergies indicates:   Allergen Reactions    Bactrim [sulfamethoxazole-trimethoprim]     Cherries     Tetracyclines      Current Outpatient Medications   Medication Sig Dispense Refill    enalapril (VASOTEC) 10 MG tablet TAKE 1 TABLET BY MOUTH EVERY DAY 30 tablet 11    tamoxifen (NOLVADEX) 20 MG Tab TAKE 1 TABLET(20 MG) BY MOUTH EVERY DAY 30 tablet 0     No current facility-administered medications for this visit.        Family History:   Family History   Problem Relation Age of Onset    Stroke Mother     No Known Problems Father     Stroke Maternal Grandmother        Social History:   Social History     Socioeconomic History    Marital status:      Spouse name: Not on file    Number of children: Not on file    Years of education: Not on file    Highest education level: Not on file   Social Needs    Financial resource strain: Not on file    Food insecurity - worry: Not on file    Food insecurity - inability: Not on file    Transportation needs - medical: Not on file    Transportation needs - non-medical: Not on file   Occupational History    Not on file   Tobacco Use    Smoking status: Never Smoker    Smokeless tobacco: Never Used   Substance and Sexual Activity    Alcohol use: No    Drug use: No    Sexual activity: Not on file   Other Topics Concern     Not on file   Social History Narrative    Not on file       Review of Systems    Objective:     Vitals:    11/27/18 1109   BP: 130/78   Pulse: 74   Temp: 98.7 °F (37.1 °C)        Physical Exam   Constitutional: She appears well-developed and well-nourished. No distress.   HENT:   Head: Normocephalic and atraumatic.   Right Ear: Hearing, tympanic membrane, external ear and ear canal normal. No drainage, swelling or tenderness. No foreign bodies. Tympanic membrane is not injected, not scarred, not perforated, not erythematous, not retracted and not bulging. Tympanic membrane mobility is normal. No middle ear effusion. No hemotympanum. No decreased hearing is noted.   Left Ear: Hearing, tympanic membrane, external ear and ear canal normal. No drainage, swelling or tenderness. No foreign bodies. Tympanic membrane is not injected, not scarred, not perforated, not erythematous, not retracted and not bulging. Tympanic membrane mobility is normal.  No middle ear effusion. No hemotympanum. No decreased hearing is noted.   Nose: Mucosal edema and rhinorrhea present. No nose lacerations, sinus tenderness, nasal deformity, septal deviation or nasal septal hematoma. No epistaxis.  No foreign bodies. Right sinus exhibits no maxillary sinus tenderness and no frontal sinus tenderness. Left sinus exhibits no maxillary sinus tenderness and no frontal sinus tenderness.   Mouth/Throat: Uvula is midline and oropharynx is clear and moist. Normal dentition. No oropharyngeal exudate, posterior oropharyngeal edema, posterior oropharyngeal erythema or tonsillar abscesses.   Eyes: Conjunctivae and EOM are normal. Pupils are equal, round, and reactive to light. Right eye exhibits no discharge. Left eye exhibits no discharge. No scleral icterus.   Neck: Normal range of motion. Neck supple. No thyromegaly present.   Cardiovascular: Normal rate, regular rhythm, normal heart sounds and intact distal pulses. Exam reveals no gallop and no  friction rub.   No murmur heard.  Pulmonary/Chest: Effort normal and breath sounds normal. No stridor. No respiratory distress. She has no wheezes. She has no rales. She exhibits no tenderness.   Lymphadenopathy:     She has no cervical adenopathy.   Skin: She is not diaphoretic.   Nursing note and vitals reviewed.    influenza A and B tests both negative.  Assessment:       1. Acute non-recurrent pansinusitis        Plan:       Catrachita was seen today for cough, sore throat, headache and generalized body aches.    Diagnoses and all orders for this visit:    Acute non-recurrent pansinusitis         No Follow-up on file.

## 2018-12-20 DIAGNOSIS — D05.12 DUCTAL CARCINOMA IN SITU (DCIS) OF LEFT BREAST: ICD-10-CM

## 2018-12-20 RX ORDER — TAMOXIFEN CITRATE 20 MG/1
TABLET ORAL
Qty: 30 TABLET | Refills: 0 | Status: SHIPPED | OUTPATIENT
Start: 2018-12-20 | End: 2019-01-18 | Stop reason: SDUPTHER

## 2019-01-18 DIAGNOSIS — D05.12 DUCTAL CARCINOMA IN SITU (DCIS) OF LEFT BREAST: ICD-10-CM

## 2019-01-18 RX ORDER — TAMOXIFEN CITRATE 20 MG/1
TABLET ORAL
Qty: 30 TABLET | Refills: 0 | Status: SHIPPED | OUTPATIENT
Start: 2019-01-18 | End: 2019-02-15 | Stop reason: SDUPTHER

## 2019-02-12 ENCOUNTER — OFFICE VISIT (OUTPATIENT)
Dept: FAMILY MEDICINE | Facility: CLINIC | Age: 59
End: 2019-02-12
Payer: COMMERCIAL

## 2019-02-12 VITALS
WEIGHT: 172.63 LBS | DIASTOLIC BLOOD PRESSURE: 76 MMHG | TEMPERATURE: 99 F | SYSTOLIC BLOOD PRESSURE: 132 MMHG | HEIGHT: 63 IN | BODY MASS INDEX: 30.59 KG/M2 | HEART RATE: 70 BPM | OXYGEN SATURATION: 98 %

## 2019-02-12 DIAGNOSIS — I10 HYPERTENSION, UNSPECIFIED TYPE: ICD-10-CM

## 2019-02-12 DIAGNOSIS — M54.50 ACUTE RIGHT-SIDED LOW BACK PAIN WITHOUT SCIATICA: Primary | ICD-10-CM

## 2019-02-12 PROCEDURE — 3008F BODY MASS INDEX DOCD: CPT | Mod: ,,, | Performed by: NURSE PRACTITIONER

## 2019-02-12 PROCEDURE — 3008F PR BODY MASS INDEX (BMI) DOCUMENTED: ICD-10-PCS | Mod: ,,, | Performed by: NURSE PRACTITIONER

## 2019-02-12 PROCEDURE — 3078F PR MOST RECENT DIASTOLIC BLOOD PRESSURE < 80 MM HG: ICD-10-PCS | Mod: ,,, | Performed by: NURSE PRACTITIONER

## 2019-02-12 PROCEDURE — 99214 PR OFFICE/OUTPT VISIT, EST, LEVL IV, 30-39 MIN: ICD-10-PCS | Mod: ,,, | Performed by: NURSE PRACTITIONER

## 2019-02-12 PROCEDURE — 3075F SYST BP GE 130 - 139MM HG: CPT | Mod: ,,, | Performed by: NURSE PRACTITIONER

## 2019-02-12 PROCEDURE — 99214 OFFICE O/P EST MOD 30 MIN: CPT | Mod: ,,, | Performed by: NURSE PRACTITIONER

## 2019-02-12 PROCEDURE — 3078F DIAST BP <80 MM HG: CPT | Mod: ,,, | Performed by: NURSE PRACTITIONER

## 2019-02-12 PROCEDURE — 3075F PR MOST RECENT SYSTOLIC BLOOD PRESS GE 130-139MM HG: ICD-10-PCS | Mod: ,,, | Performed by: NURSE PRACTITIONER

## 2019-02-12 RX ORDER — NAPROXEN 500 MG/1
500 TABLET ORAL 2 TIMES DAILY WITH MEALS
Qty: 60 TABLET | Refills: 0 | Status: SHIPPED | OUTPATIENT
Start: 2019-02-12 | End: 2019-05-20

## 2019-02-12 RX ORDER — NAPROXEN SODIUM 220 MG
220 TABLET ORAL
COMMUNITY
End: 2019-02-12 | Stop reason: ALTCHOICE

## 2019-02-12 NOTE — PATIENT INSTRUCTIONS
Back Care Tips    Caring for your back  These are things you can do to prevent a recurrence of acute back pain and to reduce symptoms from chronic back pain:  · Maintain a healthy weight. If you are overweight, losing weight will help most types of back pain.  · Exercise is an important part of recovery from most types of back pain. The muscles behind and in front of the spine support the back. This means strengthening both the back muscles and the abdominal muscles will provide better support for your spine.   · Swimming and brisk walking are good overall exercises to improve your fitness level.  · Practice safe lifting methods (below).  · Practice good posture when sitting, standing and walking. Avoid prolonged sitting. This puts more stress on the lower back than standing or walking.  · Wear quality shoes with sufficient arch support. Foot and ankle alignment can affect back symptoms. Women should avoid wearing high heels.  · Therapeutic massage can help relax the back muscles without stretching them.  · During the first 24 to 72 hours after an acute injury or flare-up of chronic back pain, apply an ice pack to the painful area for 20 minutes and then remove it for 20 minutes, over a period of 60 to 90 minutes, or several times a day. As a safety precaution, do not use a heating pad at bedtime. Sleeping on a heating pad can lead to skin burns or tissue damage.  · You can alternate ice and heat therapies.  Medications  Talk to your healthcare provider before using medicines, especially if you have other medical problems or are taking other medicines.  · You may use acetaminophen or ibuprofen to control pain, unless your healthcare provider prescribed other pain medicine. If you have chronic conditions like diabetes, liver or kidney disease, stomach ulcers, or gastrointestinal bleeding, or are taking blood thinners, talk with your healthcare provider before taking any medicines.  · Be careful if you are given  prescription pain medicines, narcotics, or medicine for muscle spasm. They can cause drowsiness, affect your coordination, reflexes, and judgment. Do not drive or operate heavy machinery while taking these types of medicines. Take prescription pain medicine only as prescribed by your healthcare provider.  Lumbar stretch  Here is a simple stretching exercise that will help relax muscle spasm and keep your back more limber. If exercise makes your back pain worse, dont do it.  · Lie on your back with your knees bent and both feet on the ground.  · Slowly raise your left knee to your chest as you flatten your lower back against the floor. Hold for 5 seconds.  · Relax and repeat the exercise with your right knee.  · Do 10 of these exercises for each leg.  Safe lifting method  · Dont bend over at the waist to lift an object off the floor.  Instead, bend your knees and hips in a squat.   · Keep your back and head upright  · Hold the object close to your body, directly in front of you.  · Straighten your legs to lift the object.   · Lower the object to the floor in the reverse fashion.  · If you must slide something across the floor, push it.  Posture tips  Sitting  Sit in chairs with straight backs or low-back support. Keep your knees lower than your hips, with your feet flat on the floor.  When driving, sit up straight. Adjust the seat forward so you are not leaning toward the steering wheel.  A small pillow or rolled towel behind your lower back may help if you are driving long distances.   Standing  When standing for long periods, shift most of your weight to one leg at a time. Alternate legs every few minutes.   Sleeping  The best way to sleep is on your side with your knees bent. Put a low pillow under your head to support your neck in a neutral spine position. Avoid thick pillows that bend your neck to one side. Put a pillow between your legs to further relax your lower back. If you sleep on your back, put pillows  under your knees to support your legs in a slightly flexed position. Use a firm mattress. If your mattress sags, replace it, or use a 1/2-inch plywood board under the mattress to add support.  Follow-up care  Follow up with your healthcare provider, or as advised.  If X-rays, a CT scan or an MRI scan were taken, they will be reviewed by a radiologist. You will be notified of any new findings that may affect your care.  Call 911  Seek emergency medical care if any of the following occur:  · Trouble breathing  · Confusion  · Very drowsy  · Fainting or loss of consciousness  · Rapid or very slow heart rate  · Loss of  bowel or bladder control  When to seek medical care  Call your healthcare provider if any of the following occur:  · Pain becomes worse or spreads to your arms or legs  · Weakness or numbness in one or both arms or legs  · Numbness in the groin area  Date Last Reviewed: 6/1/2016  © 7412-5166 [employer]. [address] [place] [address], [place] [address]. All rights reserved. This information is not intended as a substitute for professional medical care. Always follow your healthcare professional's instructions.

## 2019-02-12 NOTE — PROGRESS NOTES
SUBJECTIVE:      Patient ID: Catrachita Johnson is a 58 y.o. female.    Chief Complaint: Back Pain    Sick visit - pt of Dr. Waller, recently started back playing tennis once weekly & has had some ongoing R-sided lower back pain x 8 days, states her usual back pains may only last a day at most, this pain has been daily 8/10, tried thermacare patch & naproxen last night with considerable relief, has been sleeping with pillows under her legs for support, past history of bone spur to R foot, dominant R-hand      Back Pain   This is a new problem. The current episode started 1 to 4 weeks ago. The problem occurs daily. The problem has been gradually improving since onset. The pain is present in the lumbar spine. The quality of the pain is described as aching. The pain does not radiate. The pain is at a severity of 2/10. The pain is mild. The pain is worse during the day. The symptoms are aggravated by position, sitting, bending and twisting. Pertinent negatives include no abdominal pain, chest pain, dysuria, numbness, pelvic pain or weakness. Risk factors include obesity and sedentary lifestyle. She has tried NSAIDs (thermacare patches) for the symptoms. The treatment provided moderate relief.   Hypertension   This is a chronic problem. The current episode started more than 1 month ago. The problem is unchanged. The problem is controlled. Pertinent negatives include no chest pain, neck pain, palpitations or shortness of breath. Agents associated with hypertension include NSAIDs. Risk factors for coronary artery disease include obesity, sedentary lifestyle and family history. Past treatments include ACE inhibitors. The current treatment provides mild improvement. Compliance problems include exercise and diet.        Past Surgical History:   Procedure Laterality Date    BREAST SURGERY Left 2017    Lumpectomy     SECTION      MYOMECTOMY       Family History   Problem Relation Age of Onset    Stroke Mother      No Known Problems Father     Stroke Maternal Grandmother       Social History     Socioeconomic History    Marital status:      Spouse name: None    Number of children: None    Years of education: None    Highest education level: None   Social Needs    Financial resource strain: None    Food insecurity - worry: None    Food insecurity - inability: None    Transportation needs - medical: None    Transportation needs - non-medical: None   Occupational History    None   Tobacco Use    Smoking status: Never Smoker    Smokeless tobacco: Never Used   Substance and Sexual Activity    Alcohol use: No    Drug use: No    Sexual activity: None   Other Topics Concern    None   Social History Narrative    None     Current Outpatient Medications   Medication Sig Dispense Refill    enalapril (VASOTEC) 10 MG tablet TAKE 1 TABLET BY MOUTH EVERY DAY 30 tablet 11    tamoxifen (NOLVADEX) 20 MG Tab TAKE 1 TABLET(20 MG) BY MOUTH EVERY DAY 30 tablet 0    naproxen (NAPROSYN) 500 MG tablet Take 1 tablet (500 mg total) by mouth 2 (two) times daily with meals. 60 tablet 0     No current facility-administered medications for this visit.      Review of patient's allergies indicates:   Allergen Reactions    Bactrim [sulfamethoxazole-trimethoprim]     Cherries     Tetracyclines       Past Medical History:   Diagnosis Date    Cancer     breast    Ductal carcinoma in situ (DCIS) of left breast 2017    Hypertension     Mass of left axilla     Use of tamoxifen (Nolvadex) 2017     Past Surgical History:   Procedure Laterality Date    BREAST SURGERY Left 2017    Lumpectomy     SECTION      MYOMECTOMY         Review of Systems   Constitutional: Negative for activity change, appetite change, fatigue and unexpected weight change.   HENT: Negative for congestion, ear pain, hearing loss, postnasal drip, sinus pressure, sinus pain, sneezing and sore throat.    Eyes: Negative for photophobia  "and pain.   Respiratory: Negative for cough, chest tightness, shortness of breath and wheezing.    Cardiovascular: Negative for chest pain, palpitations and leg swelling.   Gastrointestinal: Negative for abdominal distention, abdominal pain, blood in stool, constipation, diarrhea, nausea and vomiting.   Endocrine: Negative for cold intolerance, heat intolerance, polydipsia and polyuria.   Genitourinary: Negative for difficulty urinating, dysuria, flank pain, frequency, hematuria, pelvic pain and urgency.   Musculoskeletal: Positive for arthralgias and myalgias. Negative for back pain, joint swelling and neck pain.   Skin: Negative for pallor.   Allergic/Immunologic: Negative for environmental allergies and food allergies.   Neurological: Negative for dizziness, weakness, light-headedness and numbness.   Hematological: Does not bruise/bleed easily.   Psychiatric/Behavioral: Negative for agitation, confusion, decreased concentration and sleep disturbance. The patient is not nervous/anxious.       OBJECTIVE:      Vitals:    02/12/19 1015   BP: 132/76   Pulse: 70   Temp: 98.5 °F (36.9 °C)   SpO2: 98%   Weight: 78.3 kg (172 lb 9.6 oz)   Height: 5' 3" (1.6 m)     Physical Exam   Constitutional: She is oriented to person, place, and time. Vital signs are normal. She appears well-developed and well-nourished. No distress.   obese   HENT:   Head: Normocephalic and atraumatic.   Right Ear: Hearing normal.   Left Ear: Hearing normal.   Nose: Nose normal. No rhinorrhea.   Mouth/Throat: Mucous membranes are normal.   Eyes: Conjunctivae and lids are normal. Pupils are equal, round, and reactive to light. Right eye exhibits no discharge. Left eye exhibits no discharge. Right conjunctiva is not injected. Left conjunctiva is not injected. Right pupil is round and reactive. Left pupil is round and reactive. Pupils are equal.   Neck: Trachea normal and normal range of motion. Neck supple. No JVD present. No tracheal deviation present. " No thyromegaly present.   Cardiovascular: Normal rate, regular rhythm, normal heart sounds and intact distal pulses. Exam reveals no gallop and no friction rub.   No murmur heard.  Pulses:       Radial pulses are 2+ on the right side, and 2+ on the left side.   Pulmonary/Chest: Effort normal and breath sounds normal. No stridor. No respiratory distress. She has no decreased breath sounds. She has no wheezes. She has no rhonchi. She has no rales.   Abdominal: Soft. Bowel sounds are normal. She exhibits no distension. There is no tenderness. There is no rigidity and no guarding.   Musculoskeletal: Normal range of motion. She exhibits no edema.        Arms:  Lymphadenopathy:     She has no cervical adenopathy.   Neurological: She is alert and oriented to person, place, and time. She has normal strength. She displays no atrophy. She displays a negative Romberg sign. Coordination and gait normal.   Skin: Skin is warm and dry. Capillary refill takes less than 2 seconds. No lesion and no rash noted. No cyanosis. No pallor.   Psychiatric: She has a normal mood and affect. Her speech is normal and behavior is normal. Judgment and thought content normal. Cognition and memory are normal. She is attentive.   Nursing note and vitals reviewed.     Assessment:       1. Acute right-sided low back pain without sciatica    2. Hypertension, unspecified type    3. BMI 30.0-30.9,adult        Plan:       Acute right-sided low back pain without sciatica  -     naproxen (NAPROSYN) 500 MG tablet; Take 1 tablet (500 mg total) by mouth 2 (two) times daily with meals.  Dispense: 60 tablet; Refill: 0  -     Ambulatory Referral to Physical/Occupational Therapy    Hypertension, unspecified type   - stable on med    BMI 30.0-30.9,adult   - The patient is asked to make an attempt to improve diet and exercise patterns to aid in medical management of this problem.        Follow-up if symptoms worsen or fail to improve.      2/12/2019 Nely NEFF  ENOC Boyd, FNP-C

## 2019-02-15 ENCOUNTER — OFFICE VISIT (OUTPATIENT)
Dept: FAMILY MEDICINE | Facility: CLINIC | Age: 59
End: 2019-02-15
Payer: COMMERCIAL

## 2019-02-15 VITALS
OXYGEN SATURATION: 99 % | SYSTOLIC BLOOD PRESSURE: 136 MMHG | TEMPERATURE: 99 F | BODY MASS INDEX: 31.01 KG/M2 | WEIGHT: 175 LBS | HEART RATE: 71 BPM | HEIGHT: 63 IN | DIASTOLIC BLOOD PRESSURE: 70 MMHG

## 2019-02-15 DIAGNOSIS — R53.83 FATIGUE, UNSPECIFIED TYPE: ICD-10-CM

## 2019-02-15 DIAGNOSIS — D05.12 DUCTAL CARCINOMA IN SITU (DCIS) OF LEFT BREAST: ICD-10-CM

## 2019-02-15 DIAGNOSIS — J06.9 VIRAL UPPER RESPIRATORY TRACT INFECTION: ICD-10-CM

## 2019-02-15 DIAGNOSIS — R52 GENERALIZED BODY ACHES: Primary | ICD-10-CM

## 2019-02-15 DIAGNOSIS — J04.10 TRACHEITIS: ICD-10-CM

## 2019-02-15 DIAGNOSIS — R09.81 NASAL CONGESTION: ICD-10-CM

## 2019-02-15 LAB
CTP QC/QA: YES
FLUAV AG NPH QL: NEGATIVE
FLUBV AG NPH QL: NEGATIVE

## 2019-02-15 PROCEDURE — 3075F PR MOST RECENT SYSTOLIC BLOOD PRESS GE 130-139MM HG: ICD-10-PCS | Mod: ,,, | Performed by: FAMILY MEDICINE

## 2019-02-15 PROCEDURE — 99213 OFFICE O/P EST LOW 20 MIN: CPT | Mod: 25,,, | Performed by: FAMILY MEDICINE

## 2019-02-15 PROCEDURE — 3008F PR BODY MASS INDEX (BMI) DOCUMENTED: ICD-10-PCS | Mod: ,,, | Performed by: FAMILY MEDICINE

## 2019-02-15 PROCEDURE — 3078F DIAST BP <80 MM HG: CPT | Mod: ,,, | Performed by: FAMILY MEDICINE

## 2019-02-15 PROCEDURE — 3078F PR MOST RECENT DIASTOLIC BLOOD PRESSURE < 80 MM HG: ICD-10-PCS | Mod: ,,, | Performed by: FAMILY MEDICINE

## 2019-02-15 PROCEDURE — 87804 INFLUENZA ASSAY W/OPTIC: CPT | Mod: QW,,, | Performed by: FAMILY MEDICINE

## 2019-02-15 PROCEDURE — 3075F SYST BP GE 130 - 139MM HG: CPT | Mod: ,,, | Performed by: FAMILY MEDICINE

## 2019-02-15 PROCEDURE — 99213 PR OFFICE/OUTPT VISIT, EST, LEVL III, 20-29 MIN: ICD-10-PCS | Mod: 25,,, | Performed by: FAMILY MEDICINE

## 2019-02-15 PROCEDURE — 3008F BODY MASS INDEX DOCD: CPT | Mod: ,,, | Performed by: FAMILY MEDICINE

## 2019-02-15 PROCEDURE — 87804 POCT INFLUENZA A/B: ICD-10-PCS | Mod: 59,QW,, | Performed by: FAMILY MEDICINE

## 2019-02-15 RX ORDER — AZITHROMYCIN 250 MG/1
250 TABLET, FILM COATED ORAL DAILY
Qty: 6 TABLET | Refills: 0 | Status: SHIPPED | OUTPATIENT
Start: 2019-02-15 | End: 2019-02-21

## 2019-02-15 RX ORDER — TAMOXIFEN CITRATE 20 MG/1
TABLET ORAL
Qty: 30 TABLET | Refills: 0 | Status: SHIPPED | OUTPATIENT
Start: 2019-02-15 | End: 2019-03-15 | Stop reason: SDUPTHER

## 2019-02-15 NOTE — PROGRESS NOTES
Subjective:       Patient ID: Catrachita Johnson is a 58 y.o. female.    Chief Complaint: Cough; Sore Throat; Nasal Congestion; Generalized Body Aches; Fatigue; and Nausea      Will call yesterday with us myalgias body aches cough      Cough   The current episode started yesterday. The problem has been rapidly worsening. The cough is productive of sputum (clear). Associated symptoms include chills, headaches, nasal congestion, postnasal drip, rhinorrhea, a sore throat and sweats. Pertinent negatives include no fever. Nothing aggravates the symptoms. She has tried OTC cough suppressant for the symptoms. The treatment provided no relief.   Sore Throat    Associated symptoms include coughing and headaches.   Fatigue   Associated symptoms include chills, coughing, fatigue, headaches, nausea and a sore throat. Pertinent negatives include no fever.   Nausea   Associated symptoms include chills, coughing, fatigue, headaches, nausea and a sore throat. Pertinent negatives include no fever.       Allergies and Medications:   Review of patient's allergies indicates:   Allergen Reactions    Bactrim [sulfamethoxazole-trimethoprim]     Corine     Tetracyclines      Current Outpatient Medications   Medication Sig Dispense Refill    enalapril (VASOTEC) 10 MG tablet TAKE 1 TABLET BY MOUTH EVERY DAY 30 tablet 11    naproxen (NAPROSYN) 500 MG tablet Take 1 tablet (500 mg total) by mouth 2 (two) times daily with meals. 60 tablet 0    tamoxifen (NOLVADEX) 20 MG Tab TAKE 1 TABLET(20 MG) BY MOUTH EVERY DAY 30 tablet 0    azithromycin (Z-JOSE CARLOS) 250 MG tablet Take 1 tablet (250 mg total) by mouth once daily. po on day 1 then 1 tab po on days 2-5 for 6 doses 6 tablet 0     No current facility-administered medications for this visit.        Family History:   Family History   Problem Relation Age of Onset    Stroke Mother     No Known Problems Father     Stroke Maternal Grandmother        Social History:   Social History      Socioeconomic History    Marital status:      Spouse name: Not on file    Number of children: Not on file    Years of education: Not on file    Highest education level: Not on file   Social Needs    Financial resource strain: Not on file    Food insecurity - worry: Not on file    Food insecurity - inability: Not on file    Transportation needs - medical: Not on file    Transportation needs - non-medical: Not on file   Occupational History    Not on file   Tobacco Use    Smoking status: Never Smoker    Smokeless tobacco: Never Used   Substance and Sexual Activity    Alcohol use: No    Drug use: No    Sexual activity: Not on file   Other Topics Concern    Not on file   Social History Narrative    Not on file       Review of Systems   Constitutional: Positive for chills and fatigue. Negative for fever.   HENT: Positive for postnasal drip, rhinorrhea and sore throat.    Respiratory: Positive for cough.    Gastrointestinal: Positive for nausea.   Neurological: Positive for headaches.       Objective:     Vitals:    02/15/19 1506   BP: 136/70   Pulse: 71   Temp: 98.8 °F (37.1 °C)        Physical Exam   Constitutional: She appears well-developed and well-nourished. No distress.   HENT:   Head: Normocephalic and atraumatic.   Right Ear: Hearing, tympanic membrane, external ear and ear canal normal. No drainage, swelling or tenderness. No foreign bodies. Tympanic membrane is not injected, not scarred, not perforated, not erythematous, not retracted and not bulging. Tympanic membrane mobility is normal. No middle ear effusion. No hemotympanum. No decreased hearing is noted.   Left Ear: Hearing, tympanic membrane, external ear and ear canal normal. No drainage, swelling or tenderness. No foreign bodies. Tympanic membrane is not injected, not scarred, not perforated, not erythematous, not retracted and not bulging. Tympanic membrane mobility is normal.  No middle ear effusion. No hemotympanum. No  decreased hearing is noted.   Nose: Mucosal edema and rhinorrhea present. No nose lacerations, sinus tenderness, nasal deformity or septal deviation. Right sinus exhibits no maxillary sinus tenderness and no frontal sinus tenderness. Left sinus exhibits no maxillary sinus tenderness and no frontal sinus tenderness.   Mouth/Throat: Uvula is midline and oropharynx is clear and moist. Normal dentition. No oropharyngeal exudate, posterior oropharyngeal edema, posterior oropharyngeal erythema or tonsillar abscesses.   Eyes: Conjunctivae and EOM are normal. Pupils are equal, round, and reactive to light. Right eye exhibits no discharge. Left eye exhibits no discharge. No scleral icterus.   Neck: Normal range of motion. Neck supple. No thyromegaly present.   Cardiovascular: Normal rate, regular rhythm, normal heart sounds and intact distal pulses. Exam reveals no gallop and no friction rub.   No murmur heard.  Pulmonary/Chest: Effort normal and breath sounds normal. No stridor. No respiratory distress. She has no wheezes. She has no rales. She exhibits no tenderness.   Lymphadenopathy:     She has no cervical adenopathy.   Skin: She is not diaphoretic.   Nursing note and vitals reviewed.      Assessment:       1. Generalized body aches    2. Fatigue, unspecified type    3. Nasal congestion    4. Viral upper respiratory tract infection    5. Tracheitis        Plan:       Catrachita was seen today for cough, sore throat, nasal congestion, generalized body aches, fatigue and nausea.    Diagnoses and all orders for this visit:    Generalized body aches  -     POCT Influenza A/B    Fatigue, unspecified type  -     POCT Influenza A/B    Nasal congestion  -     POCT Influenza A/B  -     azithromycin (Z-JOSE CARLOS) 250 MG tablet; Take 1 tablet (250 mg total) by mouth once daily. po on day 1 then 1 tab po on days 2-5 for 6 doses    Viral upper respiratory tract infection  -     POCT Influenza A/B    Tracheitis  -     POCT Influenza A/B  -      azithromycin (Z-JOSE CARLOS) 250 MG tablet; Take 1 tablet (250 mg total) by mouth once daily. po on day 1 then 1 tab po on days 2-5 for 6 doses         Follow-up in about 2 weeks (around 3/1/2019), or if symptoms worsen or fail to improve, for If no improvement, URI.

## 2019-02-15 NOTE — PATIENT INSTRUCTIONS
Pocket Rx for Antibiotics:    Start Antibiotics  if :  1) infection lasting longer than 5 days  2) unilateral symptoms in the sinuses or unilateral nosebleed  3) fever greater than 100.4  4) cough or nasal discharge productive of blood  Take Tylenol and Advil alternating every 3 hours such that each is dosed every 6 hours.

## 2019-02-20 RX ORDER — ENALAPRIL MALEATE 10 MG/1
TABLET ORAL
Qty: 30 TABLET | Refills: 5 | Status: SHIPPED | OUTPATIENT
Start: 2019-02-20 | End: 2019-08-22 | Stop reason: SDUPTHER

## 2019-02-25 ENCOUNTER — TELEPHONE (OUTPATIENT)
Dept: HEMATOLOGY/ONCOLOGY | Facility: CLINIC | Age: 59
End: 2019-02-25

## 2019-02-25 DIAGNOSIS — D05.12 DUCTAL CARCINOMA IN SITU (DCIS) OF LEFT BREAST: Primary | ICD-10-CM

## 2019-02-25 DIAGNOSIS — Z79.810 USE OF TAMOXIFEN (NOLVADEX): ICD-10-CM

## 2019-02-25 NOTE — TELEPHONE ENCOUNTER
Called to see if the pt had any labs done prior to f/u appt.    Pt will do the labs before the visit at [facility] but will keep the appt.

## 2019-02-26 ENCOUNTER — OFFICE VISIT (OUTPATIENT)
Dept: HEMATOLOGY/ONCOLOGY | Facility: CLINIC | Age: 59
End: 2019-02-26
Payer: COMMERCIAL

## 2019-02-26 VITALS
HEART RATE: 65 BPM | BODY MASS INDEX: 30.7 KG/M2 | RESPIRATION RATE: 20 BRPM | DIASTOLIC BLOOD PRESSURE: 80 MMHG | TEMPERATURE: 98 F | SYSTOLIC BLOOD PRESSURE: 123 MMHG | WEIGHT: 173.31 LBS

## 2019-02-26 DIAGNOSIS — Z79.810 USE OF TAMOXIFEN (NOLVADEX): ICD-10-CM

## 2019-02-26 DIAGNOSIS — D05.12 DUCTAL CARCINOMA IN SITU (DCIS) OF LEFT BREAST: Primary | ICD-10-CM

## 2019-02-26 PROCEDURE — 3074F PR MOST RECENT SYSTOLIC BLOOD PRESSURE < 130 MM HG: ICD-10-PCS | Mod: ,,, | Performed by: INTERNAL MEDICINE

## 2019-02-26 PROCEDURE — 3079F DIAST BP 80-89 MM HG: CPT | Mod: ,,, | Performed by: INTERNAL MEDICINE

## 2019-02-26 PROCEDURE — 99214 PR OFFICE/OUTPT VISIT, EST, LEVL IV, 30-39 MIN: ICD-10-PCS | Mod: ,,, | Performed by: INTERNAL MEDICINE

## 2019-02-26 PROCEDURE — 3008F PR BODY MASS INDEX (BMI) DOCUMENTED: ICD-10-PCS | Mod: ,,, | Performed by: INTERNAL MEDICINE

## 2019-02-26 PROCEDURE — 3008F BODY MASS INDEX DOCD: CPT | Mod: ,,, | Performed by: INTERNAL MEDICINE

## 2019-02-26 PROCEDURE — 99214 OFFICE O/P EST MOD 30 MIN: CPT | Mod: ,,, | Performed by: INTERNAL MEDICINE

## 2019-02-26 PROCEDURE — 3074F SYST BP LT 130 MM HG: CPT | Mod: ,,, | Performed by: INTERNAL MEDICINE

## 2019-02-26 PROCEDURE — 3079F PR MOST RECENT DIASTOLIC BLOOD PRESSURE 80-89 MM HG: ICD-10-PCS | Mod: ,,, | Performed by: INTERNAL MEDICINE

## 2019-02-26 NOTE — LETTER
February 26, 2019      Jean-Pierre Waller MD  [address] [facility]  [address]  [place] [address]           [facility] - Hematology Oncology  [address]  [address]  [place] [address]  Phone: [phone]  Fax: [phone]          Patient: Catrachita Johnson   MR Number: [id number]   YOB: 1960   Date of Visit: 2/26/2019       Dear Dr. Jean-Pierre Waller:    Thank you for referring Catrachita Johnson to me for evaluation. Attached you will find relevant portions of my assessment and plan of care.    If you have questions, please do not hesitate to call me. I look forward to following Catrachita Johnson along with you.    Sincerely,    Festus Walter MD    Enclosure  CC:  No Recipients    If you would like to receive this communication electronically, please contact [email or web address] or [phone] to request more information on [employer] access.    For providers and/or their staff who would like to refer a patient to [facility], please contact us through our one-stop-shop provider referral line, [facility], at [phone].    If you feel you have received this communication in error or would no longer like to receive these types of communications, please e-mail [email or web address]

## 2019-02-26 NOTE — PROGRESS NOTES
[facility] Hematology/Oncology  PROGRESS NOTE      Subjective:       Patient ID:   NAME: Catrachita Johnson : 1960     58 y.o. female    Referring Doc: Sridhar  Other Physicians: Marylin Gomez Eddington    Chief Complaint:  DCIS f/u    History of Present Illness:     Patient returns today for a regularly scheduled follow-up visit.  She saw her GYN this past July. She denies any CP,SOB, HA's or N/V. She is on tamoxifen and doing well with it. She is here by herself. She had recent upper respiratory illness which she is recovering from.           ROS:   GEN: normal without any fever, night sweats or weight loss  HEENT: normal with no HA's, sore throat, stiff neck, changes in vision  CV: normal with no CP, SOB, PND, JACKSON or orthopnea  PULM: normal with no SOB, cough, hemoptysis, sputum or pleuritic pain  GI: normal with no abdominal pain, nausea, vomiting, constipation, diarrhea, melanotic stools, BRBPR, or hematemesis  : normal with no hematuria, dysuria  BREAST: normal with no mass, discharge, pain  SKIN: normal with no rash, erythema, bruising, or swelling    Allergies:  Review of patient's allergies indicates:   Allergen Reactions    Bactrim [sulfamethoxazole-trimethoprim]     Cherries     Tetracyclines        Medications:    Current Outpatient Medications:     enalapril (VASOTEC) 10 MG tablet, TAKE 1 TABLET BY MOUTH EVERY DAY, Disp: 30 tablet, Rfl: 5    naproxen (NAPROSYN) 500 MG tablet, Take 1 tablet (500 mg total) by mouth 2 (two) times daily with meals., Disp: 60 tablet, Rfl: 0    tamoxifen (NOLVADEX) 20 MG Tab, TAKE 1 TABLET(20 MG) BY MOUTH EVERY DAY, Disp: 30 tablet, Rfl: 0    PMHx/PSHx Updates:  See patient's last visit with me on 10/23/2018  See H&P on 2017        Pathology:  Cancer Staging  Ductal carcinoma in situ (DCIS) of left breast  Staging form: Onc Breast AJCC V7  - Clinical: Stage 0 (Tis (DCIS), N0, M0) - Signed by Arnie Gomez Jr., MD on 2017          Objective:      Vitals:  Blood pressure 123/80, pulse 65, temperature 98.4 °F (36.9 °C), temperature source Oral, resp. rate 20, weight 78.6 kg (173 lb 4.8 oz).    Physical Examination:   GEN: no apparent distress, comfortable; AAOx3  HEAD: atraumatic and normocephalic  EYES: no pallor, no icterus, PERRLA  ENT: OMM, no pharyngeal erythema, external ears WNL; no nasal discharge; no thrush  NECK: no masses, thyroid normal, trachea midline, no LAD/LN's, supple  CV: RRR with no murmur; normal pulse; normal S1 and S2; no pedal edema  CHEST: Normal respiratory effort; CTAB; normal breath sounds; no wheeze or crackles  ABDOM: nontender and nondistended; soft; normal bowel sounds; no rebound/guarding  MUSC/Skeletal: ROM normal; no crepitus; joints normal; no deformities or arthropathy  EXTREM: no clubbing, cyanosis, inflammation or swelling  SKIN: no rashes, lesions, ulcers, petechiae or subcutaneous nodules  : no simons  NEURO: grossly intact; motor/sensory WNL; AAOx3; no tremors  PSYCH: normal mood, affect and behavior  LYMPH: normal cervical, supraclavicular, axillary and groin LN's  Breast: left breast looks good, no LAD, masses etc          Labs:       2/26/2019 pending from [facility]    10/23/2018   Lab Results   Component Value Date    WBC 5.5 10/23/2018    HGB 11.4 (L) 10/23/2018    HCT 34.4 (L) 10/23/2018    MCV 88.7 10/23/2018     10/23/2018     BMP  Lab Results   Component Value Date     10/23/2018    K 3.9 10/23/2018     10/23/2018    CO2 32 10/23/2018    BUN 13 10/23/2018    CREATININE 0.74 10/23/2018    CALCIUM 9.2 10/23/2018    ESTGFRAFRICA 103 10/23/2018    EGFRNONAA 89 10/23/2018     Lab Results   Component Value Date    ALT 13 10/23/2018    AST 17 10/23/2018    ALKPHOS 65 10/23/2018    BILITOT 0.2 10/23/2018           Radiology/Diagnostic Studies:    Mammo July 20/2018 on chart - negative    I have reviewed all available lab results and radiology reports.    Assessment/Plan:   (1) 58 y.o. female with  diagnosis of DCIS who has been referred by Dr Coulter for oncology evaluation  - followed by Dr Waldo Padilla with GYN  - positive family history of breast cancer (2nd degree relatives)  - DCIS with no invasive component  - ER+/FL+  - previously discussed the prognostics of DCIS especially the up to 25% risk of breast cancer development in patients who do not have resective surgery  - Dr Coulter with s/p lumpectomy on 9/13/2017; required radiation afterwards to provide equivocal survival benefit when compared to patient who have complete mastectomies  - I discussed the recommendation for postoperative radiation and she completed XRT on Nov 6th  -  Previously discussed antihormone therapy with tamoxifen, and also discussed the side-effect profile and the need to f/u with GYN yearly     (2) Irritable bowel syndrome     (3) HTN - followed by PCP      1. Ductal carcinoma in situ (DCIS) of left breast     2. Use of tamoxifen (Nolvadex)           PLAN:  1. Continue tamoxifen; check up to date labs every 3 months (encouraged compliance)  2. F/u with PCP, GenSurg, Rad/onc, GYN   3. Mammo due this summer; schedule CXR  4. RTC in 4 months  Fax note to  Jean-Pierre Waller MD, Patricia Coulter R. Eddington    Discussion:     I have explained all of the above in detail and the patient understands all of the current recommendation(s). I have answered all of their questions to the best of my ability and to their complete satisfaction.   The patient is to continue with the current management plan.            Electronically signed by Festus Walter MD

## 2019-02-27 ENCOUNTER — TELEPHONE (OUTPATIENT)
Dept: HEMATOLOGY/ONCOLOGY | Facility: CLINIC | Age: 59
End: 2019-02-27

## 2019-02-27 DIAGNOSIS — D05.12 DUCTAL CARCINOMA IN SITU (DCIS) OF LEFT BREAST: Primary | ICD-10-CM

## 2019-02-27 LAB
ALBUMIN SERPL-MCNC: 3.9 G/DL (ref 3.6–5.1)
ALBUMIN/GLOB SERPL: 1.3 (CALC) (ref 1–2.5)
ALP SERPL-CCNC: 66 U/L (ref 33–130)
ALT SERPL-CCNC: 13 U/L (ref 6–29)
AST SERPL-CCNC: 17 U/L (ref 10–35)
BASOPHILS # BLD AUTO: 28 CELLS/UL (ref 0–200)
BASOPHILS NFR BLD AUTO: 0.5 %
BILIRUB SERPL-MCNC: 0.3 MG/DL (ref 0.2–1.2)
BUN SERPL-MCNC: 12 MG/DL (ref 7–25)
BUN/CREAT SERPL: NORMAL (CALC) (ref 6–22)
CALCIUM SERPL-MCNC: 8.9 MG/DL (ref 8.6–10.4)
CHLORIDE SERPL-SCNC: 106 MMOL/L (ref 98–110)
CO2 SERPL-SCNC: 28 MMOL/L (ref 20–32)
CREAT SERPL-MCNC: 0.78 MG/DL (ref 0.5–1.05)
EOSINOPHIL # BLD AUTO: 61 CELLS/UL (ref 15–500)
EOSINOPHIL NFR BLD AUTO: 1.1 %
ERYTHROCYTE [DISTWIDTH] IN BLOOD BY AUTOMATED COUNT: 12.9 % (ref 11–15)
GFRSERPLBLD MDRD-ARVRAT: 83 ML/MIN/1.73M2
GLOBULIN SER CALC-MCNC: 3.1 G/DL (CALC) (ref 1.9–3.7)
GLUCOSE SERPL-MCNC: 85 MG/DL (ref 65–99)
HCT VFR BLD AUTO: 35 % (ref 35–45)
HGB BLD-MCNC: 11.3 G/DL (ref 11.7–15.5)
LYMPHOCYTES # BLD AUTO: 1568 CELLS/UL (ref 850–3900)
LYMPHOCYTES NFR BLD AUTO: 28.5 %
MCH RBC QN AUTO: 28.6 PG (ref 27–33)
MCHC RBC AUTO-ENTMCNC: 32.3 G/DL (ref 32–36)
MCV RBC AUTO: 88.6 FL (ref 80–100)
MONOCYTES # BLD AUTO: 292 CELLS/UL (ref 200–950)
MONOCYTES NFR BLD AUTO: 5.3 %
NEUTROPHILS # BLD AUTO: 3553 CELLS/UL (ref 1500–7800)
NEUTROPHILS NFR BLD AUTO: 64.6 %
PLATELET # BLD AUTO: 278 THOUSAND/UL (ref 140–400)
PMV BLD REES-ECKER: 10 FL (ref 7.5–12.5)
POTASSIUM SERPL-SCNC: 4 MMOL/L (ref 3.5–5.3)
PROT SERPL-MCNC: 7 G/DL (ref 6.1–8.1)
RBC # BLD AUTO: 3.95 MILLION/UL (ref 3.8–5.1)
SODIUM SERPL-SCNC: 141 MMOL/L (ref 135–146)
WBC # BLD AUTO: 5.5 THOUSAND/UL (ref 3.8–10.8)

## 2019-03-11 DIAGNOSIS — M54.50 ACUTE RIGHT-SIDED LOW BACK PAIN WITHOUT SCIATICA: ICD-10-CM

## 2019-03-11 RX ORDER — NAPROXEN 500 MG/1
TABLET ORAL
Qty: 60 TABLET | Refills: 0 | OUTPATIENT
Start: 2019-03-11

## 2019-03-15 DIAGNOSIS — D05.12 DUCTAL CARCINOMA IN SITU (DCIS) OF LEFT BREAST: ICD-10-CM

## 2019-03-15 RX ORDER — TAMOXIFEN CITRATE 20 MG/1
TABLET ORAL
Qty: 30 TABLET | Refills: 0 | Status: SHIPPED | OUTPATIENT
Start: 2019-03-15 | End: 2019-04-15 | Stop reason: SDUPTHER

## 2019-04-15 DIAGNOSIS — D05.12 DUCTAL CARCINOMA IN SITU (DCIS) OF LEFT BREAST: ICD-10-CM

## 2019-04-15 RX ORDER — TAMOXIFEN CITRATE 20 MG/1
TABLET ORAL
Qty: 30 TABLET | Refills: 0 | Status: SHIPPED | OUTPATIENT
Start: 2019-04-15 | End: 2019-05-13 | Stop reason: SDUPTHER

## 2019-05-13 DIAGNOSIS — D05.12 DUCTAL CARCINOMA IN SITU (DCIS) OF LEFT BREAST: ICD-10-CM

## 2019-05-13 RX ORDER — TAMOXIFEN CITRATE 20 MG/1
TABLET ORAL
Qty: 30 TABLET | Refills: 0 | Status: SHIPPED | OUTPATIENT
Start: 2019-05-13 | End: 2019-06-11 | Stop reason: SDUPTHER

## 2019-05-20 ENCOUNTER — OFFICE VISIT (OUTPATIENT)
Dept: FAMILY MEDICINE | Facility: CLINIC | Age: 59
End: 2019-05-20
Payer: COMMERCIAL

## 2019-05-20 VITALS
BODY MASS INDEX: 31.07 KG/M2 | OXYGEN SATURATION: 97 % | TEMPERATURE: 99 F | WEIGHT: 175.38 LBS | SYSTOLIC BLOOD PRESSURE: 132 MMHG | HEART RATE: 80 BPM | RESPIRATION RATE: 18 BRPM | DIASTOLIC BLOOD PRESSURE: 82 MMHG | HEIGHT: 63 IN

## 2019-05-20 DIAGNOSIS — L23.9 ALLERGIC CONTACT DERMATITIS, UNSPECIFIED TRIGGER: Primary | ICD-10-CM

## 2019-05-20 PROCEDURE — 3079F DIAST BP 80-89 MM HG: CPT | Mod: ,,, | Performed by: NURSE PRACTITIONER

## 2019-05-20 PROCEDURE — 99213 OFFICE O/P EST LOW 20 MIN: CPT | Mod: ,,, | Performed by: NURSE PRACTITIONER

## 2019-05-20 PROCEDURE — 3075F PR MOST RECENT SYSTOLIC BLOOD PRESS GE 130-139MM HG: ICD-10-PCS | Mod: ,,, | Performed by: NURSE PRACTITIONER

## 2019-05-20 PROCEDURE — 3008F BODY MASS INDEX DOCD: CPT | Mod: ,,, | Performed by: NURSE PRACTITIONER

## 2019-05-20 PROCEDURE — 99213 PR OFFICE/OUTPT VISIT, EST, LEVL III, 20-29 MIN: ICD-10-PCS | Mod: ,,, | Performed by: NURSE PRACTITIONER

## 2019-05-20 PROCEDURE — 3079F PR MOST RECENT DIASTOLIC BLOOD PRESSURE 80-89 MM HG: ICD-10-PCS | Mod: ,,, | Performed by: NURSE PRACTITIONER

## 2019-05-20 PROCEDURE — 3075F SYST BP GE 130 - 139MM HG: CPT | Mod: ,,, | Performed by: NURSE PRACTITIONER

## 2019-05-20 PROCEDURE — 3008F PR BODY MASS INDEX (BMI) DOCUMENTED: ICD-10-PCS | Mod: ,,, | Performed by: NURSE PRACTITIONER

## 2019-05-20 RX ORDER — METHYLPREDNISOLONE 4 MG/1
TABLET ORAL
Qty: 1 PACKAGE | Refills: 0 | Status: SHIPPED | OUTPATIENT
Start: 2019-05-20 | End: 2019-06-10

## 2019-05-20 RX ORDER — HYDROXYZINE PAMOATE 25 MG/1
25 CAPSULE ORAL 4 TIMES DAILY
Qty: 20 CAPSULE | Refills: 0 | Status: SHIPPED | OUTPATIENT
Start: 2019-05-20 | End: 2020-07-15

## 2019-05-20 NOTE — PROGRESS NOTES
"    SUBJECTIVE:      Patient ID: Catrachita Johnson is a 58 y.o. female.    Chief Complaint: Rash    Presents today for c/o rash x 2-3 days which started on her left arm after working in the yard. States the rash was initially just a round spot, so she used some birgit oil treatment "concoction" which seemed to help. Denies any blisters, drainage or vesicles. Now noticed her rash was spreading last night and becoming more itchy/uncomfortable. Denies any new foods or laundry detergents/lotions.     Rash   This is a new problem. Episode onset: x 2-3 days. The problem has been gradually worsening since onset. The affected locations include the chest, back, face, left arm and right arm. The rash is characterized by redness and itchiness. She was exposed to plant contact. Pertinent negatives include no anorexia, congestion, cough, diarrhea, eye pain, facial edema, fatigue, fever, joint pain, nail changes, rhinorrhea, shortness of breath, sore throat or vomiting. Treatments tried: birgit oil treatment on left arm- improved  The treatment provided mild relief. There is no history of allergies, asthma or eczema.       Family History   Problem Relation Age of Onset    Stroke Mother     No Known Problems Father     Stroke Maternal Grandmother       Social History     Socioeconomic History    Marital status:      Spouse name: Not on file    Number of children: Not on file    Years of education: Not on file    Highest education level: Not on file   Occupational History    Not on file   Social Needs    Financial resource strain: Not on file    Food insecurity:     Worry: Not on file     Inability: Not on file    Transportation needs:     Medical: Not on file     Non-medical: Not on file   Tobacco Use    Smoking status: Never Smoker    Smokeless tobacco: Never Used   Substance and Sexual Activity    Alcohol use: No    Drug use: No    Sexual activity: Not on file   Lifestyle    Physical activity:     Days per week: " Not on file     Minutes per session: Not on file    Stress: Not on file   Relationships    Social connections:     Talks on phone: Not on file     Gets together: Not on file     Attends [employer] service: Not on file     Active member of club or organization: Not on file     Attends meetings of clubs or organizations: Not on file     Relationship status: Not on file   Other Topics Concern    Not on file   Social History Narrative    Not on file     Current Outpatient Medications   Medication Sig Dispense Refill    enalapril (VASOTEC) 10 MG tablet TAKE 1 TABLET BY MOUTH EVERY DAY 30 tablet 5    tamoxifen (NOLVADEX) 20 MG Tab TAKE 1 TABLET(20 MG) BY MOUTH EVERY DAY 30 tablet 0    hydrOXYzine pamoate (VISTARIL) 25 MG Cap Take 1 capsule (25 mg total) by mouth 4 (four) times daily. 20 capsule 0    methylPREDNISolone (MEDROL DOSEPACK) 4 mg tablet Take per pack instructions 1 Package 0     No current facility-administered medications for this visit.      Review of patient's allergies indicates:   Allergen Reactions    Bactrim [sulfamethoxazole-trimethoprim]     Cherries     Tetracyclines       Past Medical History:   Diagnosis Date    Cancer     breast    Ductal carcinoma in situ (DCIS) of left breast 2017    Hypertension     Mass of left axilla     Use of tamoxifen (Nolvadex) 2017     Past Surgical History:   Procedure Laterality Date    BREAST SURGERY Left 2017    Lumpectomy     SECTION      MYOMECTOMY         Review of Systems   Constitutional: Negative for chills, fatigue, fever and unexpected weight change.   HENT: Negative for congestion, ear discharge, ear pain, rhinorrhea, sneezing, sore throat and trouble swallowing.    Eyes: Negative for pain, discharge and itching.   Respiratory: Negative for cough, chest tightness, shortness of breath, wheezing and stridor.    Cardiovascular: Negative for chest pain and palpitations.   Gastrointestinal: Negative for abdominal pain,  "anorexia, diarrhea, nausea and vomiting.   Genitourinary: Negative for dysuria, flank pain and frequency.   Musculoskeletal: Negative for arthralgias, joint pain and myalgias.   Skin: Positive for rash. Negative for nail changes, pallor and wound.   Allergic/Immunologic: Positive for food allergies (cherries ). Negative for immunocompromised state.   Neurological: Negative for dizziness and headaches.      OBJECTIVE:      Vitals:    05/20/19 1023   BP: 132/82   Pulse: 80   Resp: 18   Temp: 98.6 °F (37 °C)   TempSrc: Oral   SpO2: 97%   Weight: 79.6 kg (175 lb 6.4 oz)   Height: 5' 3" (1.6 m)     Physical Exam   Constitutional: She is oriented to person, place, and time. She appears well-developed and well-nourished. No distress.   HENT:   Head: Normocephalic and atraumatic.   Nose: Nose normal.   Mouth/Throat: Oropharynx is clear and moist. No oropharyngeal exudate.   Eyes: Pupils are equal, round, and reactive to light. EOM are normal.   Neck: Normal range of motion. Neck supple.   Cardiovascular: Normal rate, regular rhythm and normal heart sounds. Exam reveals no gallop and no friction rub.   No murmur heard.  Pulmonary/Chest: Effort normal and breath sounds normal. No stridor. No respiratory distress. She has no wheezes. She has no rales.   Abdominal: Soft. Bowel sounds are normal. She exhibits no distension and no mass. There is no tenderness.   Lymphadenopathy:     She has no cervical adenopathy.   Neurological: She is alert and oriented to person, place, and time.   Skin: Skin is warm and dry. Rash noted. No laceration, no lesion and no petechiae noted. Rash is urticarial (pink hives scattered on chest, upper back, arms neck and lower face). Rash is not macular, not papular, not maculopapular and not vesicular. No cyanosis.   Psychiatric: She has a normal mood and affect. Her behavior is normal. Judgment and thought content normal.   Nursing note and vitals reviewed.     Assessment:       1. Allergic contact " dermatitis, unspecified trigger        Plan:       Allergic contact dermatitis, unspecified trigger  -     methylPREDNISolone (MEDROL DOSEPACK) 4 mg tablet; Take per pack instructions  Dispense: 1 Package; Refill: 0  -     hydrOXYzine pamoate (VISTARIL) 25 MG Cap; Take 1 capsule (25 mg total) by mouth 4 (four) times daily.  Dispense: 20 capsule; Refill: 0     *advised meds as prescribed and Pepcid otc x 7 days; proceed to ER for any worsening symptoms or new onset SOB   *Side effects of new medication discussed with patient; understanding voiced.    Follow up in about 3 months (around 8/20/2019), or if symptoms worsen or fail to improve, for annual .      5/20/2019 ENOC Bolaños, FNP

## 2019-05-20 NOTE — PATIENT INSTRUCTIONS
"  Contact Dermatitis  Contact dermatitis is a skin rash caused by something that touches the skin and makes it irritated and inflamed. Your skin may be red, swollen, dry, and may be cracked. Blisters may form and ooze. The rash will itch.  Contact dermatitis can form on the face and neck, backs of hands, forearms, genitals, and lower legs.  People can get contact dermatitis from lots of sources. These include:  · Plants such as poison ivy, oak, or sumac  · Chemicals in hair dyes and rinses, soaps, solvents, waxes, fingernail polish, and deodorants   · Jewelry or watchbands made of nickel  Contact dermatitis is not passed from person to person.  Talk with your healthcare provider about what may have caused the rash. A type of allergy testing called "patch testing" may be used to discover what you are allergic to. You will need to avoid the source of your rash in the future to prevent it from coming back.  Treatment is done to relieve itching and prevent the rash from coming back. The rash should go away in a few days to a few weeks.  Home care  Your healthcare provider may prescribe medicine to relieve swelling and itching. Follow all instructions when using these medicines.  General care:  · Avoid anything that heats up your skin, such as hot showers or baths, or direct sunlight. This can make itching worse.  · Apply cold compresses to soothe your sores to help relieve your symptoms. Do this for 30 minutes 3 to 4 times a day. You can make a cold compress by soaking a cloth in cold water. Squeeze out excess water. You can add colloidal oatmeal to the water to help reduce itching. For severe itching in a small area, apply an ice pack wrapped in a thin towel. Do this for 20 minutes 3 to 4 times a day.  · You can also try wet dressings. One way to do this is to wear a wet piece of clothing under a dry one. Wear a damp shirt under a dry shirt if your upper body is affected. This can relieve itching and prevent you from " scratching the affected area.  · You can also help relieve large areas of itching by taking a lukewarm bath with colloidal oatmeal added to the water.  · Use hydrocortisone cream for redness and irritation, unless another medicine was prescribed. You can also use benzocaine anesthetic cream or spray. Calamine lotion can also relieve mild symptoms.  · Use oral diphenhydramine to help reduce itching. You can buy this antihistamine at drug and grocery stores. It can make you sleepy, so use lower doses during the daytime. Or you can use loratadine. This is an antihistamine that will not make you sleepy. Do not use diphenhydramine if you have glaucoma or have trouble urinating due to an enlarged prostate.  · If a plant causes your rash, make sure to wash your skin and the clothes you were wearing when you came into contact with the plant. This is to wash away the plant oils that gave you the rash and prevent more or worse symptoms.  · Stay away from the substance or object that causes your symptoms. If you cant avoid it, wear gloves or some other type of protection.  Follow-up care  Follow up with your healthcare provider, or as advised.  When to seek medical advice  Call your healthcare provider right away if any of these occur:  · Spreading of the rash to other parts of your body  · Severe swelling of your face, eyelids, mouth, throat or tongue  · Trouble urinating due to swelling in the genital area  · Fever of 100.4°F (38°C) or higher  · Redness or swelling that gets worse  · Pain that gets worse  · Foul-smelling fluid leaking from the skin  · Yellow-brown crusts on the open blisters  Date Last Reviewed: 9/1/2016  © 9069-5040 [employer]. [address] [place] [address], [place] [address]. All rights reserved. This information is not intended as a substitute for professional medical care. Always follow your healthcare professional's instructions.      Take Pepcid OTC once daily x 1 week

## 2019-06-11 DIAGNOSIS — D05.12 DUCTAL CARCINOMA IN SITU (DCIS) OF LEFT BREAST: ICD-10-CM

## 2019-06-11 RX ORDER — TAMOXIFEN CITRATE 20 MG/1
TABLET ORAL
Qty: 30 TABLET | Refills: 0 | Status: SHIPPED | OUTPATIENT
Start: 2019-06-11 | End: 2019-07-01 | Stop reason: SDUPTHER

## 2019-07-01 DIAGNOSIS — D05.12 DUCTAL CARCINOMA IN SITU (DCIS) OF LEFT BREAST: ICD-10-CM

## 2019-07-01 RX ORDER — TAMOXIFEN CITRATE 20 MG/1
TABLET ORAL
Qty: 30 TABLET | Refills: 0 | Status: SHIPPED | OUTPATIENT
Start: 2019-07-01 | End: 2019-07-03 | Stop reason: SDUPTHER

## 2019-07-03 DIAGNOSIS — D05.12 DUCTAL CARCINOMA IN SITU (DCIS) OF LEFT BREAST: ICD-10-CM

## 2019-07-03 RX ORDER — TAMOXIFEN CITRATE 20 MG/1
TABLET ORAL
Qty: 90 TABLET | Refills: 0 | Status: SHIPPED | OUTPATIENT
Start: 2019-07-03 | End: 2019-07-23 | Stop reason: SDUPTHER

## 2019-07-23 DIAGNOSIS — D05.12 DUCTAL CARCINOMA IN SITU (DCIS) OF LEFT BREAST: ICD-10-CM

## 2019-07-23 RX ORDER — TAMOXIFEN CITRATE 20 MG/1
TABLET ORAL
Qty: 90 TABLET | Refills: 0 | Status: SHIPPED | OUTPATIENT
Start: 2019-07-23 | End: 2020-01-20

## 2019-07-24 NOTE — PROGRESS NOTES
[facility] Hematology/Oncology  PROGRESS NOTE      Subjective:       Patient ID:   NAME: Catrachita Johnson : 1960     58 y.o. female    Referring Doc: Sridhar  Other Physicians: Marylin Gomez Eddington    Chief Complaint:  DCIS f/u    History of Present Illness:     Patient returns today for a regularly scheduled follow-up visit.  She saw Heidi Estrella NP on 2019.. She denies any CP,SOB, HA's or N/V. She has been on tamoxifen and doing well with it. She is here by herself.         ROS:   GEN: normal without any fever, night sweats or weight loss  HEENT: normal with no HA's, sore throat, stiff neck, changes in vision  CV: normal with no CP, SOB, PND, JACKSON or orthopnea  PULM: normal with no SOB, cough, hemoptysis, sputum or pleuritic pain  GI: normal with no abdominal pain, nausea, vomiting, constipation, diarrhea, melanotic stools, BRBPR, or hematemesis  : normal with no hematuria, dysuria  BREAST: normal with no mass, discharge, pain  SKIN: normal with no rash, erythema, bruising, or swelling    Allergies:  Review of patient's allergies indicates:   Allergen Reactions    Bactrim [sulfamethoxazole-trimethoprim]     Cherries     Tetracyclines        Medications:    Current Outpatient Medications:     enalapril (VASOTEC) 10 MG tablet, TAKE 1 TABLET BY MOUTH EVERY DAY, Disp: 30 tablet, Rfl: 5    hydrOXYzine pamoate (VISTARIL) 25 MG Cap, Take 1 capsule (25 mg total) by mouth 4 (four) times daily., Disp: 20 capsule, Rfl: 0    tamoxifen (NOLVADEX) 20 MG Tab, TAKE 1 TABLET(20 MG) BY MOUTH EVERY DAY, Disp: 90 tablet, Rfl: 0    PMHx/PSHx Updates:  See patient's last visit with me on 2019  See H&P on 2017        Pathology:  Cancer Staging  Ductal carcinoma in situ (DCIS) of left breast  Staging form: Onc Breast AJCC V7  - Clinical: Stage 0 (Tis (DCIS), N0, M0) - Signed by Arnie Gomez Jr., MD on 2017          Objective:     Vitals:  Blood pressure 131/80, pulse 69, temperature 98.6 °F (37 °C), resp.  rate 18, weight 79 kg (174 lb 3.2 oz).    Physical Examination:   GEN: no apparent distress, comfortable; AAOx3  HEAD: atraumatic and normocephalic  EYES: no pallor, no icterus, PERRLA  ENT: OMM, no pharyngeal erythema, external ears WNL; no nasal discharge; no thrush  NECK: no masses, thyroid normal, trachea midline, no LAD/LN's, supple  CV: RRR with no murmur; normal pulse; normal S1 and S2; no pedal edema  CHEST: Normal respiratory effort; CTAB; normal breath sounds; no wheeze or crackles  ABDOM: nontender and nondistended; soft; normal bowel sounds; no rebound/guarding  MUSC/Skeletal: ROM normal; no crepitus; joints normal; no deformities or arthropathy  EXTREM: no clubbing, cyanosis, inflammation or swelling  SKIN: no rashes, lesions, ulcers, petechiae or subcutaneous nodules  : no simons  NEURO: grossly intact; motor/sensory WNL; AAOx3; no tremors  PSYCH: normal mood, affect and behavior  LYMPH: normal cervical, supraclavicular, axillary and groin LN's  Breast: left breast looks adequate, no LAD, masses etc          Labs:       2/26/2019   Lab Results   Component Value Date    WBC 5.5 02/26/2019    HGB 11.3 (L) 02/26/2019    HCT 35.0 02/26/2019    MCV 88.6 02/26/2019     02/26/2019     CMP  Sodium   Date Value Ref Range Status   02/26/2019 141 135 - 146 mmol/L Final   09/08/2017 141 134 - 144 mmol/L      Potassium   Date Value Ref Range Status   02/26/2019 4.0 3.5 - 5.3 mmol/L Final     Chloride   Date Value Ref Range Status   02/26/2019 106 98 - 110 mmol/L Final   09/08/2017 105 98 - 110 mmol/L      CO2   Date Value Ref Range Status   02/26/2019 28 20 - 32 mmol/L Final     Glucose   Date Value Ref Range Status   02/26/2019 85 65 - 99 mg/dL Final     Comment:                   Fasting reference interval        09/08/2017 83 70 - 99 mg/dL      BUN, Bld   Date Value Ref Range Status   02/26/2019 12 7 - 25 mg/dL Final     Creatinine   Date Value Ref Range Status   02/26/2019 0.78 0.50 - 1.05 mg/dL Final      Comment:     For patients >49 years of age, the reference limit  for Creatinine is approximately 13% higher for people  identified as -American.        09/08/2017 0.79 0.60 - 1.40 mg/dL      Calcium   Date Value Ref Range Status   02/26/2019 8.9 8.6 - 10.4 mg/dL Final     Total Protein   Date Value Ref Range Status   02/26/2019 7.0 6.1 - 8.1 g/dL Final     Albumin   Date Value Ref Range Status   02/26/2019 3.9 3.6 - 5.1 g/dL Final   09/08/2017 4.1 3.1 - 4.7 g/dL      Total Bilirubin   Date Value Ref Range Status   02/26/2019 0.3 0.2 - 1.2 mg/dL Final     Alkaline Phosphatase   Date Value Ref Range Status   02/26/2019 66 33 - 130 U/L Final     AST   Date Value Ref Range Status   02/26/2019 17 10 - 35 U/L Final     ALT   Date Value Ref Range Status   02/26/2019 13 6 - 29 U/L Final     eGFR if    Date Value Ref Range Status   02/26/2019 96 > OR = 60 mL/min/1.73m2 Final     eGFR if non    Date Value Ref Range Status   02/26/2019 83 > OR = 60 mL/min/1.73m2 Final         10/23/2018   Lab Results   Component Value Date    WBC 5.5 02/26/2019    HGB 11.3 (L) 02/26/2019    HCT 35.0 02/26/2019    MCV 88.6 02/26/2019     02/26/2019     BMP  Lab Results   Component Value Date     02/26/2019    K 4.0 02/26/2019     02/26/2019    CO2 28 02/26/2019    BUN 12 02/26/2019    CREATININE 0.78 02/26/2019    CALCIUM 8.9 02/26/2019    ESTGFRAFRICA 96 02/26/2019    EGFRNONAA 83 02/26/2019     Lab Results   Component Value Date    ALT 13 02/26/2019    AST 17 02/26/2019    ALKPHOS 66 02/26/2019    BILITOT 0.3 02/26/2019           Radiology/Diagnostic Studies:    Mammo July 22nd 2019 on chart      CXR  7/22/2019 on chart    I have reviewed all available lab results and radiology reports.    Assessment/Plan:   (1) 58 y.o. female with diagnosis of DCIS who has been referred by Dr Coulter for oncology evaluation  - followed by Dr Waldo Padilla with GYN  - positive family history of  breast cancer (2nd degree relatives)  - DCIS with no invasive component  - ER+/AZ+  - previously discussed the prognostics of DCIS especially the up to 25% risk of breast cancer development in patients who do not have resective surgery  - Dr Coulter with s/p lumpectomy on 9/13/2017; required radiation afterwards to provide equivocal survival benefit when compared to patient who have complete mastectomies  - I discussed the recommendation for postoperative radiation and she completed XRT on Nov 6th 2017  -  Previously discussed antihormone therapy with tamoxifen, and also discussed the side-effect profile and the need to f/u with GYN yearly  - mammo 7/22/2019 was negative with benign findings     (2) Irritable bowel syndrome     (3) HTN - followed by PCP      1. Ductal carcinoma in situ (DCIS) of left breast     2. Use of tamoxifen (Nolvadex)           PLAN:  1. Continue tamoxifen; check up to date labs every 6 months (encouraged compliance)  2. F/u with PCP, GenSurg, Rad/onc, GYN   3. Mammo due again July 2020   4. RTC in 6 months  Fax note to  Jean-Pierre Waller MD, Patricia Coulter R. Eddington    Discussion:     I have explained all of the above in detail and the patient understands all of the current recommendation(s). I have answered all of their questions to the best of my ability and to their complete satisfaction.   The patient is to continue with the current management plan.            Electronically signed by Festus Walter MD

## 2019-07-25 ENCOUNTER — OFFICE VISIT (OUTPATIENT)
Dept: HEMATOLOGY/ONCOLOGY | Facility: CLINIC | Age: 59
End: 2019-07-25
Payer: COMMERCIAL

## 2019-07-25 VITALS
DIASTOLIC BLOOD PRESSURE: 80 MMHG | SYSTOLIC BLOOD PRESSURE: 131 MMHG | RESPIRATION RATE: 18 BRPM | HEART RATE: 69 BPM | TEMPERATURE: 99 F | WEIGHT: 174.19 LBS | BODY MASS INDEX: 30.86 KG/M2

## 2019-07-25 DIAGNOSIS — Z79.810 USE OF TAMOXIFEN (NOLVADEX): ICD-10-CM

## 2019-07-25 DIAGNOSIS — D05.12 DUCTAL CARCINOMA IN SITU (DCIS) OF LEFT BREAST: Primary | ICD-10-CM

## 2019-07-25 PROCEDURE — 3008F PR BODY MASS INDEX (BMI) DOCUMENTED: ICD-10-PCS | Mod: ,,, | Performed by: INTERNAL MEDICINE

## 2019-07-25 PROCEDURE — 3008F BODY MASS INDEX DOCD: CPT | Mod: ,,, | Performed by: INTERNAL MEDICINE

## 2019-07-25 PROCEDURE — 99214 OFFICE O/P EST MOD 30 MIN: CPT | Mod: ,,, | Performed by: INTERNAL MEDICINE

## 2019-07-25 PROCEDURE — 3075F SYST BP GE 130 - 139MM HG: CPT | Mod: ,,, | Performed by: INTERNAL MEDICINE

## 2019-07-25 PROCEDURE — 3079F PR MOST RECENT DIASTOLIC BLOOD PRESSURE 80-89 MM HG: ICD-10-PCS | Mod: ,,, | Performed by: INTERNAL MEDICINE

## 2019-07-25 PROCEDURE — 99214 PR OFFICE/OUTPT VISIT, EST, LEVL IV, 30-39 MIN: ICD-10-PCS | Mod: ,,, | Performed by: INTERNAL MEDICINE

## 2019-07-25 PROCEDURE — 3075F PR MOST RECENT SYSTOLIC BLOOD PRESS GE 130-139MM HG: ICD-10-PCS | Mod: ,,, | Performed by: INTERNAL MEDICINE

## 2019-07-25 PROCEDURE — 3079F DIAST BP 80-89 MM HG: CPT | Mod: ,,, | Performed by: INTERNAL MEDICINE

## 2019-07-25 NOTE — LETTER
July 25, 2019      Jean-Pierre Waller MD  [address] [facility]  [address]  [place] [address]           [facility] - Hematology Oncology  [address]  [address]  [place] [address]  Phone: [phone]  Fax: [phone]          Patient: Catrachita Johnson   MR Number: [id number]   YOB: 1960   Date of Visit: 7/25/2019       Dear Dr. Jean-Pierre Waller:    Thank you for referring Catrachita Johnson to me for evaluation. Attached you will find relevant portions of my assessment and plan of care.    If you have questions, please do not hesitate to call me. I look forward to following Catrachita Johnson along with you.    Sincerely,    Festus Walter MD    Enclosure  CC:  No Recipients    If you would like to receive this communication electronically, please contact [email or web address] or [phone] to request more information on [employer] access.    For providers and/or their staff who would like to refer a patient to [facility], please contact us through our one-stop-shop provider referral line, [facility], at [phone].    If you feel you have received this communication in error or would no longer like to receive these types of communications, please e-mail [email or web address]

## 2019-07-26 LAB
ALBUMIN SERPL-MCNC: 4 G/DL (ref 3.6–5.1)
ALBUMIN/GLOB SERPL: 1.5 (CALC) (ref 1–2.5)
ALP SERPL-CCNC: 69 U/L (ref 33–130)
ALT SERPL-CCNC: 13 U/L (ref 6–29)
AST SERPL-CCNC: 18 U/L (ref 10–35)
BASOPHILS # BLD AUTO: 20 CELLS/UL (ref 0–200)
BASOPHILS NFR BLD AUTO: 0.4 %
BILIRUB SERPL-MCNC: 0.2 MG/DL (ref 0.2–1.2)
BUN SERPL-MCNC: 11 MG/DL (ref 7–25)
BUN/CREAT SERPL: NORMAL (CALC) (ref 6–22)
CALCIUM SERPL-MCNC: 9.3 MG/DL (ref 8.6–10.4)
CHLORIDE SERPL-SCNC: 105 MMOL/L (ref 98–110)
CO2 SERPL-SCNC: 30 MMOL/L (ref 20–32)
CREAT SERPL-MCNC: 0.74 MG/DL (ref 0.5–1.05)
EOSINOPHIL # BLD AUTO: 51 CELLS/UL (ref 15–500)
EOSINOPHIL NFR BLD AUTO: 1 %
ERYTHROCYTE [DISTWIDTH] IN BLOOD BY AUTOMATED COUNT: 13.1 % (ref 11–15)
GFRSERPLBLD MDRD-ARVRAT: 89 ML/MIN/1.73M2
GLOBULIN SER CALC-MCNC: 2.7 G/DL (CALC) (ref 1.9–3.7)
GLUCOSE SERPL-MCNC: 74 MG/DL (ref 65–99)
HCT VFR BLD AUTO: 35.3 % (ref 35–45)
HGB BLD-MCNC: 11.3 G/DL (ref 11.7–15.5)
LYMPHOCYTES # BLD AUTO: 1188 CELLS/UL (ref 850–3900)
LYMPHOCYTES NFR BLD AUTO: 23.3 %
MCH RBC QN AUTO: 28.4 PG (ref 27–33)
MCHC RBC AUTO-ENTMCNC: 32 G/DL (ref 32–36)
MCV RBC AUTO: 88.7 FL (ref 80–100)
MONOCYTES # BLD AUTO: 321 CELLS/UL (ref 200–950)
MONOCYTES NFR BLD AUTO: 6.3 %
NEUTROPHILS # BLD AUTO: 3519 CELLS/UL (ref 1500–7800)
NEUTROPHILS NFR BLD AUTO: 69 %
PLATELET # BLD AUTO: 238 THOUSAND/UL (ref 140–400)
PMV BLD REES-ECKER: 10 FL (ref 7.5–12.5)
POTASSIUM SERPL-SCNC: 4.4 MMOL/L (ref 3.5–5.3)
PROT SERPL-MCNC: 6.7 G/DL (ref 6.1–8.1)
RBC # BLD AUTO: 3.98 MILLION/UL (ref 3.8–5.1)
SODIUM SERPL-SCNC: 141 MMOL/L (ref 135–146)
WBC # BLD AUTO: 5.1 THOUSAND/UL (ref 3.8–10.8)

## 2019-08-22 RX ORDER — ENALAPRIL MALEATE 10 MG/1
TABLET ORAL
Qty: 90 TABLET | Refills: 0 | Status: SHIPPED | OUTPATIENT
Start: 2019-08-22 | End: 2019-11-20 | Stop reason: SDUPTHER

## 2019-11-20 RX ORDER — ENALAPRIL MALEATE 10 MG/1
TABLET ORAL
Qty: 90 TABLET | Refills: 0 | Status: SHIPPED | OUTPATIENT
Start: 2019-11-20 | End: 2020-02-18

## 2020-01-19 DIAGNOSIS — D05.12 DUCTAL CARCINOMA IN SITU (DCIS) OF LEFT BREAST: ICD-10-CM

## 2020-01-19 NOTE — PROGRESS NOTES
[facility] Hematology/Oncology  PROGRESS NOTE      Subjective:       Patient ID:   NAME: Catrachita Johnson : 1960     59 y.o. female    Referring Doc: Sridhar  Other Physicians: Marylin Gomez Eddington    Chief Complaint:  DCIS f/u    History of Present Illness:     Patient returns today for a regularly scheduled follow-up visit.  She last saw Heidi Estrella NP on 2019.. She denies any CP,SOB, HA's or N/V. She has been on tamoxifen and doing well with it. She is here by herself. She has not had any repeat labs. She is here by herself.         ROS:   GEN: normal without any fever, night sweats or weight loss  HEENT: normal with no HA's, sore throat, stiff neck, changes in vision  CV: normal with no CP, SOB, PND, JACKSON or orthopnea  PULM: normal with no SOB, cough, hemoptysis, sputum or pleuritic pain  GI: normal with no abdominal pain, nausea, vomiting, constipation, diarrhea, melanotic stools, BRBPR, or hematemesis  : normal with no hematuria, dysuria  BREAST: normal with no mass, discharge, pain  SKIN: normal with no rash, erythema, bruising, or swelling    Allergies:  Review of patient's allergies indicates:   Allergen Reactions    Bactrim [sulfamethoxazole-trimethoprim]     Chersandra     Tetracyclines        Medications:    Current Outpatient Medications:     enalapril (VASOTEC) 10 MG tablet, TAKE ONE TABLET BY MOUTH EVERY DAY, Disp: 90 tablet, Rfl: 0    hydrOXYzine pamoate (VISTARIL) 25 MG Cap, Take 1 capsule (25 mg total) by mouth 4 (four) times daily., Disp: 20 capsule, Rfl: 0    tamoxifen (NOLVADEX) 20 MG Tab, TAKE 1 TABLET(20 MG) BY MOUTH EVERY DAY, Disp: 30 tablet, Rfl: 11    PMHx/PSHx Updates:  See patient's last visit with me on 2019  See H&P on 2017        Pathology:  Cancer Staging  Ductal carcinoma in situ (DCIS) of left breast  Staging form: Onc Breast AJCC V7  - Clinical: Stage 0 (Tis (DCIS), N0, M0) - Signed by Arnie Gomez Jr., MD on 2017          Objective:      Vitals:  Blood pressure 123/81, pulse 67, temperature 98 °F (36.7 °C), resp. rate 18, weight 79.5 kg (175 lb 3.2 oz).    Physical Examination:   GEN: no apparent distress, comfortable; AAOx3  HEAD: atraumatic and normocephalic  EYES: no pallor, no icterus, PERRLA  ENT: OMM, no pharyngeal erythema, external ears WNL; no nasal discharge; no thrush  NECK: no masses, thyroid normal, trachea midline, no LAD/LN's, supple  CV: RRR with no murmur; normal pulse; normal S1 and S2; no pedal edema  CHEST: Normal respiratory effort; CTAB; normal breath sounds; no wheeze or crackles  ABDOM: nontender and nondistended; soft; normal bowel sounds; no rebound/guarding  MUSC/Skeletal: ROM normal; no crepitus; joints normal; no deformities or arthropathy  EXTREM: no clubbing, cyanosis, inflammation or swelling  SKIN: no rashes, lesions, ulcers, petechiae or subcutaneous nodules  : no simons  NEURO: grossly intact; motor/sensory WNL; AAOx3; no tremors  PSYCH: normal mood, affect and behavior  LYMPH: normal cervical, supraclavicular, axillary and groin LN's  Breast: left breast with no changes, no LAD, masses etc          Labs:       7/25/2019   Lab Results   Component Value Date    WBC 5.1 07/25/2019    HGB 11.3 (L) 07/25/2019    HCT 35.3 07/25/2019    MCV 88.7 07/25/2019     07/25/2019     CMP  Sodium   Date Value Ref Range Status   07/25/2019 141 135 - 146 mmol/L Final   09/08/2017 141 134 - 144 mmol/L      Potassium   Date Value Ref Range Status   07/25/2019 4.4 3.5 - 5.3 mmol/L Final     Chloride   Date Value Ref Range Status   07/25/2019 105 98 - 110 mmol/L Final   09/08/2017 105 98 - 110 mmol/L      CO2   Date Value Ref Range Status   07/25/2019 30 20 - 32 mmol/L Final     Glucose   Date Value Ref Range Status   07/25/2019 74 65 - 99 mg/dL Final     Comment:                   Fasting reference interval        09/08/2017 83 70 - 99 mg/dL      BUN, Bld   Date Value Ref Range Status   07/25/2019 11 7 - 25 mg/dL Final      Creatinine   Date Value Ref Range Status   07/25/2019 0.74 0.50 - 1.05 mg/dL Final     Comment:     For patients >49 years of age, the reference limit  for Creatinine is approximately 13% higher for people  identified as -American.        09/08/2017 0.79 0.60 - 1.40 mg/dL      Calcium   Date Value Ref Range Status   07/25/2019 9.3 8.6 - 10.4 mg/dL Final     Total Protein   Date Value Ref Range Status   07/25/2019 6.7 6.1 - 8.1 g/dL Final     Albumin   Date Value Ref Range Status   07/25/2019 4.0 3.6 - 5.1 g/dL Final   09/08/2017 4.1 3.1 - 4.7 g/dL      Total Bilirubin   Date Value Ref Range Status   07/25/2019 0.2 0.2 - 1.2 mg/dL Final     Alkaline Phosphatase   Date Value Ref Range Status   07/25/2019 69 33 - 130 U/L Final     AST   Date Value Ref Range Status   07/25/2019 18 10 - 35 U/L Final     ALT   Date Value Ref Range Status   07/25/2019 13 6 - 29 U/L Final     eGFR if    Date Value Ref Range Status   07/25/2019 103 > OR = 60 mL/min/1.73m2 Final     eGFR if non    Date Value Ref Range Status   07/25/2019 89 > OR = 60 mL/min/1.73m2 Final               Radiology/Diagnostic Studies:    Mammo July 22nd 2019 on chart      CXR  7/22/2019 on chart    I have reviewed all available lab results and radiology reports.    Assessment/Plan:   (1) 58 y.o. female with diagnosis of DCIS who has been referred by Dr Coulter for oncology evaluation  - followed by Dr Waldo Padilla with GYN  - positive family history of breast cancer (2nd degree relatives)  - DCIS with no invasive component  - ER+/NV+  - previously discussed the prognostics of DCIS especially the up to 25% risk of breast cancer development in patients who do not have resective surgery  - Dr Coulter with s/p lumpectomy on 9/13/2017; required radiation afterwards to provide equivocal survival benefit when compared to patient who have complete mastectomies  - I discussed the recommendation for postoperative radiation and  she completed XRT on Nov 6th 2017  -  Previously discussed antihormone therapy with tamoxifen, and also discussed the side-effect profile and the need to f/u with GYN yearly  - mammo 7/22/2019 was negative with benign findings     (2) Irritable bowel syndrome     (3) HTN - followed by PCP      1. Ductal carcinoma in situ (DCIS) of left breast     2. Use of tamoxifen (Nolvadex)           PLAN:  1. Continue tamoxifen; check up to date labs every 6 months (encouraged compliance)  2. F/u with PCP, GenSurg, Rad/onc, GYN   3. Mammo due again July 2020   4. RTC in 6 months  Fax note to  Jean-Pierre Waller MD, Patricia Coulter R. Eddington    Discussion:     I have explained all of the above in detail and the patient understands all of the current recommendation(s). I have answered all of their questions to the best of my ability and to their complete satisfaction.   The patient is to continue with the current management plan.            Electronically signed by Festus Walter MD

## 2020-01-20 ENCOUNTER — OFFICE VISIT (OUTPATIENT)
Dept: HEMATOLOGY/ONCOLOGY | Facility: CLINIC | Age: 60
End: 2020-01-20
Payer: COMMERCIAL

## 2020-01-20 VITALS
WEIGHT: 175.19 LBS | SYSTOLIC BLOOD PRESSURE: 123 MMHG | BODY MASS INDEX: 31.04 KG/M2 | HEART RATE: 67 BPM | RESPIRATION RATE: 18 BRPM | TEMPERATURE: 98 F | DIASTOLIC BLOOD PRESSURE: 81 MMHG

## 2020-01-20 DIAGNOSIS — Z79.810 USE OF TAMOXIFEN (NOLVADEX): ICD-10-CM

## 2020-01-20 DIAGNOSIS — D05.12 DUCTAL CARCINOMA IN SITU (DCIS) OF LEFT BREAST: Primary | ICD-10-CM

## 2020-01-20 PROCEDURE — 99213 OFFICE O/P EST LOW 20 MIN: CPT | Mod: S$GLB,,, | Performed by: INTERNAL MEDICINE

## 2020-01-20 PROCEDURE — 99213 PR OFFICE/OUTPT VISIT, EST, LEVL III, 20-29 MIN: ICD-10-PCS | Mod: S$GLB,,, | Performed by: INTERNAL MEDICINE

## 2020-01-20 RX ORDER — TAMOXIFEN CITRATE 20 MG/1
TABLET ORAL
Qty: 30 TABLET | Refills: 11 | Status: SHIPPED | OUTPATIENT
Start: 2020-01-20 | End: 2021-01-13

## 2020-02-18 RX ORDER — ENALAPRIL MALEATE 10 MG/1
TABLET ORAL
Qty: 90 TABLET | OUTPATIENT
Start: 2020-02-18

## 2020-02-18 RX ORDER — ENALAPRIL MALEATE 10 MG/1
TABLET ORAL
Qty: 30 TABLET | Refills: 0 | Status: SHIPPED | OUTPATIENT
Start: 2020-02-18 | End: 2020-03-24

## 2020-03-24 DIAGNOSIS — I10 HYPERTENSION, UNSPECIFIED TYPE: Primary | ICD-10-CM

## 2020-03-24 RX ORDER — ENALAPRIL MALEATE 10 MG/1
TABLET ORAL
Qty: 30 TABLET | Refills: 0 | Status: SHIPPED | OUTPATIENT
Start: 2020-03-24 | End: 2020-04-20

## 2020-03-24 NOTE — TELEPHONE ENCOUNTER
Needs virtual visit follow up or clinic appt for further refills - I will fill for another 30 days, but please explain she will need to schedule one of these visits

## 2020-04-18 DIAGNOSIS — I10 HYPERTENSION, UNSPECIFIED TYPE: ICD-10-CM

## 2020-04-20 DIAGNOSIS — I10 HYPERTENSION, UNSPECIFIED TYPE: ICD-10-CM

## 2020-04-20 RX ORDER — ENALAPRIL MALEATE 10 MG/1
TABLET ORAL
Qty: 90 TABLET | OUTPATIENT
Start: 2020-04-20

## 2020-04-20 RX ORDER — ENALAPRIL MALEATE 10 MG/1
TABLET ORAL
Qty: 30 TABLET | Refills: 0 | Status: SHIPPED | OUTPATIENT
Start: 2020-04-20 | End: 2020-06-10 | Stop reason: SDUPTHER

## 2020-05-18 DIAGNOSIS — I10 HYPERTENSION, UNSPECIFIED TYPE: ICD-10-CM

## 2020-05-18 RX ORDER — ENALAPRIL MALEATE 10 MG/1
TABLET ORAL
Qty: 30 TABLET | Refills: 0 | OUTPATIENT
Start: 2020-05-18

## 2020-06-09 ENCOUNTER — TELEPHONE (OUTPATIENT)
Dept: FAMILY MEDICINE | Facility: CLINIC | Age: 60
End: 2020-06-09

## 2020-06-10 ENCOUNTER — TELEPHONE (OUTPATIENT)
Dept: FAMILY MEDICINE | Facility: CLINIC | Age: 60
End: 2020-06-10

## 2020-06-10 DIAGNOSIS — I10 HYPERTENSION, UNSPECIFIED TYPE: ICD-10-CM

## 2020-06-10 RX ORDER — ENALAPRIL MALEATE 10 MG/1
10 TABLET ORAL DAILY
Qty: 30 TABLET | Refills: 0 | Status: SHIPPED | OUTPATIENT
Start: 2020-06-10 | End: 2020-06-10

## 2020-07-15 ENCOUNTER — OFFICE VISIT (OUTPATIENT)
Dept: FAMILY MEDICINE | Facility: CLINIC | Age: 60
End: 2020-07-15
Payer: COMMERCIAL

## 2020-07-15 VITALS
OXYGEN SATURATION: 98 % | TEMPERATURE: 99 F | BODY MASS INDEX: 31.15 KG/M2 | SYSTOLIC BLOOD PRESSURE: 134 MMHG | HEART RATE: 71 BPM | WEIGHT: 175.81 LBS | HEIGHT: 63 IN | DIASTOLIC BLOOD PRESSURE: 84 MMHG

## 2020-07-15 DIAGNOSIS — D05.12 DUCTAL CARCINOMA IN SITU (DCIS) OF LEFT BREAST: ICD-10-CM

## 2020-07-15 DIAGNOSIS — Z12.11 SCREENING FOR COLON CANCER: ICD-10-CM

## 2020-07-15 DIAGNOSIS — I10 HYPERTENSION, UNSPECIFIED TYPE: Primary | ICD-10-CM

## 2020-07-15 DIAGNOSIS — L30.4 INTERTRIGO: ICD-10-CM

## 2020-07-15 PROCEDURE — 99213 OFFICE O/P EST LOW 20 MIN: CPT | Mod: S$GLB,,, | Performed by: FAMILY MEDICINE

## 2020-07-15 PROCEDURE — 3008F PR BODY MASS INDEX (BMI) DOCUMENTED: ICD-10-PCS | Mod: S$GLB,,, | Performed by: FAMILY MEDICINE

## 2020-07-15 PROCEDURE — 3075F SYST BP GE 130 - 139MM HG: CPT | Mod: S$GLB,,, | Performed by: FAMILY MEDICINE

## 2020-07-15 PROCEDURE — 3008F BODY MASS INDEX DOCD: CPT | Mod: S$GLB,,, | Performed by: FAMILY MEDICINE

## 2020-07-15 PROCEDURE — 3075F PR MOST RECENT SYSTOLIC BLOOD PRESS GE 130-139MM HG: ICD-10-PCS | Mod: S$GLB,,, | Performed by: FAMILY MEDICINE

## 2020-07-15 PROCEDURE — 99213 PR OFFICE/OUTPT VISIT, EST, LEVL III, 20-29 MIN: ICD-10-PCS | Mod: S$GLB,,, | Performed by: FAMILY MEDICINE

## 2020-07-15 PROCEDURE — 3079F PR MOST RECENT DIASTOLIC BLOOD PRESSURE 80-89 MM HG: ICD-10-PCS | Mod: S$GLB,,, | Performed by: FAMILY MEDICINE

## 2020-07-15 PROCEDURE — 3079F DIAST BP 80-89 MM HG: CPT | Mod: S$GLB,,, | Performed by: FAMILY MEDICINE

## 2020-07-15 NOTE — PROGRESS NOTES
Subjective:       Patient ID: Catrachita Johnson is a 59 y.o. female.    Chief Complaint: Hypertension      Patient is here to follow-up on blood pressure she reports that she has been exercising regularly and has lost inches if not weight.   still the put blood pressure is up and down.  BP Readings from Last 3 Encounters:  07/15/20 : (!) 150/86  01/20/20 : 123/81  07/25/19 : 131/80  Wt Readings from Last 4 Encounters:  07/15/20 : 79.7 kg (175 lb 12.8 oz)  01/20/20 : 79.5 kg (175 lb 3.2 oz)  07/25/19 : 79 kg (174 lb 3.2 oz)  05/20/19 : 79.6 kg (175 lb 6.4 oz)  Home blood pressure med met measurement after exercise in the 120s over 80s.  Feels good more energy later in the day.        Hypertension  This is a recurrent problem. The current episode started more than 1 year ago. The problem is unchanged. The problem is controlled. Associated symptoms include sweats. Pertinent negatives include no anxiety, blurred vision, chest pain, headaches, malaise/fatigue, neck pain, orthopnea, palpitations, peripheral edema, PND or shortness of breath. There are no associated agents to hypertension. There are no known risk factors for coronary artery disease. The current treatment provides significant improvement. There are no compliance problems.        Allergies and Medications:   Review of patient's allergies indicates:   Allergen Reactions    Bactrim [sulfamethoxazole-trimethoprim]     Cherries     Tetracyclines      Current Outpatient Medications   Medication Sig Dispense Refill    enalapril (VASOTEC) 10 MG tablet TAKE 1 TABLET(10 MG) BY MOUTH EVERY DAY 90 tablet 0    tamoxifen (NOLVADEX) 20 MG Tab TAKE 1 TABLET(20 MG) BY MOUTH EVERY DAY 30 tablet 11     No current facility-administered medications for this visit.        Family History:   Family History   Problem Relation Age of Onset    Stroke Mother     No Known Problems Father     Stroke Maternal Grandmother        Social History:   Social History     Socioeconomic  History    Marital status:      Spouse name: Not on file    Number of children: Not on file    Years of education: Not on file    Highest education level: Not on file   Occupational History    Not on file   Social Needs    Financial resource strain: Not hard at all    Food insecurity     Worry: Never true     Inability: Never true    Transportation needs     Medical: No     Non-medical: No   Tobacco Use    Smoking status: Never Smoker    Smokeless tobacco: Never Used   Substance and Sexual Activity    Alcohol use: No     Frequency: Never     Drinks per session: Patient refused     Binge frequency: Never    Drug use: No    Sexual activity: Not on file   Lifestyle    Physical activity     Days per week: 5 days     Minutes per session: 30 min    Stress: Only a little   Relationships    Social connections     Talks on phone: Once a week     Gets together: Never     Attends Jehovah's witness service: Not on file     Active member of club or organization: No     Attends meetings of clubs or organizations: Never     Relationship status:    Other Topics Concern    Not on file   Social History Narrative    Not on file       Review of Systems   Constitutional: Negative for malaise/fatigue.   Eyes: Negative for blurred vision.   Respiratory: Negative for shortness of breath.    Cardiovascular: Negative for chest pain, palpitations, orthopnea and PND.   Musculoskeletal: Negative for neck pain.   Neurological: Negative for headaches.       Objective:     Vitals:    07/15/20 1606   BP: 134/84   Pulse:    Temp:         Physical Exam  Vitals signs and nursing note reviewed.   Constitutional:       Appearance: She is well-developed.   HENT:      Head: Normocephalic and atraumatic.   Eyes:      Pupils: Pupils are equal, round, and reactive to light.   Neck:      Musculoskeletal: Normal range of motion. No neck rigidity or muscular tenderness.      Vascular: No carotid bruit.   Cardiovascular:      Rate and  Rhythm: Normal rate and regular rhythm.      Heart sounds: Normal heart sounds. No murmur. No friction rub. No gallop.    Pulmonary:      Effort: Pulmonary effort is normal. No respiratory distress.      Breath sounds: Normal breath sounds. No stridor. No wheezing or rales.   Chest:      Chest wall: No tenderness.       Lymphadenopathy:      Cervical: No cervical adenopathy.   Psychiatric:         Behavior: Behavior normal.         Thought Content: Thought content normal.         Judgment: Judgment normal.         Assessment:       1. Hypertension, unspecified type    2. Intertrigo    3. BMI 30.0-30.9,adult    4. Ductal carcinoma in situ (DCIS) of left breast    5. Screening for colon cancer        Plan:       Catrachita was seen today for hypertension.    Diagnoses and all orders for this visit:    Hypertension, unspecified type    Intertrigo    BMI 30.0-30.9,adult    Ductal carcinoma in situ (DCIS) of left breast    Screening for colon cancer  -     Cologuard Screening (Multitarget Stool DNA); Future  -     Cologuard Screening (Multitarget Stool DNA)         Follow up in about 6 months (around 1/15/2021) for follow up HTN.

## 2020-07-29 NOTE — PROGRESS NOTES
"   [facility] Hematology/Oncology  PROGRESS NOTE      Subjective:       Patient ID:   NAME: Catrachita Johnson : 1960     59 y.o. female    Referring Doc: Sridhar  Other Physicians: Marylin Gomez [place]    Chief Complaint:  DCIS f/u    History of Present Illness:     Patient returns today for a regularly scheduled follow-up visit.  She last saw Dr Waller on 7/15/2020.. She denies any CP,SOB, HA's or N/V. She has been on tamoxifen and doing well with it. She is here by herself.     discussed covid19 precautions      ROS:   GEN: normal without any fever, night sweats or weight loss  HEENT: normal with no HA's, sore throat, stiff neck, changes in vision  CV: normal with no CP, SOB, PND, JACKSON or orthopnea  PULM: normal with no SOB, cough, hemoptysis, sputum or pleuritic pain  GI: normal with no abdominal pain, nausea, vomiting, constipation, diarrhea, melanotic stools, BRBPR, or hematemesis  : normal with no hematuria, dysuria  BREAST: normal with no mass, discharge, pain  SKIN: normal with no rash, erythema, bruising, or swelling    Allergies:  Review of patient's allergies indicates:   Allergen Reactions    Bactrim [sulfamethoxazole-trimethoprim]     Cherries     Tetracyclines        Medications:    Current Outpatient Medications:     enalapril (VASOTEC) 10 MG tablet, TAKE 1 TABLET(10 MG) BY MOUTH EVERY DAY, Disp: 90 tablet, Rfl: 0    tamoxifen (NOLVADEX) 20 MG Tab, TAKE 1 TABLET(20 MG) BY MOUTH EVERY DAY, Disp: 30 tablet, Rfl: 11    PMHx/PSHx Updates:  See patient's last visit with me on 2020  See H&P on 2017        Pathology:  Cancer Staging  Ductal carcinoma in situ (DCIS) of left breast  Staging form: Onc Breast AJCC V7  - Clinical: Stage 0 (Tis (DCIS), N0, M0) - Signed by Arnie Gomez Jr., MD on 2017          Objective:     Vitals:  Blood pressure (!) 167/85, pulse 75, temperature 97.3 °F (36.3 °C), resp. rate 18, height 5' 3" (1.6 m), weight 79.7 kg (175 lb 11.2 oz).    Physical " Examination:   GEN: no apparent distress, comfortable; AAOx3  HEAD: atraumatic and normocephalic  EYES: no pallor, no icterus, PERRLA  ENT: OMM, no pharyngeal erythema, external ears WNL; no nasal discharge; no thrush  NECK: no masses, thyroid normal, trachea midline, no LAD/LN's, supple  CV: RRR with no murmur; normal pulse; normal S1 and S2; no pedal edema  CHEST: Normal respiratory effort; CTAB; normal breath sounds; no wheeze or crackles  ABDOM: nontender and nondistended; soft; normal bowel sounds; no rebound/guarding  MUSC/Skeletal: ROM normal; no crepitus; joints normal; no deformities or arthropathy  EXTREM: no clubbing, cyanosis, inflammation or swelling  SKIN: no rashes, lesions, ulcers, petechiae or subcutaneous nodules  : no simons  NEURO: grossly intact; motor/sensory WNL; AAOx3; no tremors  PSYCH: normal mood, affect and behavior  LYMPH: normal cervical, supraclavicular, axillary and groin LN's  Breast: left breast with no changes, no LAD, masses etc          Labs:          Lab Results   Component Value Date    WBC 5.2 01/20/2020    HGB 11.6 (L) 01/20/2020    HCT 34.8 (L) 01/20/2020    MCV 85.9 01/20/2020     01/20/2020     CMP  Sodium   Date Value Ref Range Status   01/20/2020 143 135 - 146 mmol/L Final   09/08/2017 141 134 - 144 mmol/L      Potassium   Date Value Ref Range Status   01/20/2020 4.2 3.5 - 5.3 mmol/L Final     Chloride   Date Value Ref Range Status   01/20/2020 108 98 - 110 mmol/L Final   09/08/2017 105 98 - 110 mmol/L      CO2   Date Value Ref Range Status   01/20/2020 31 20 - 32 mmol/L Final     Glucose   Date Value Ref Range Status   01/20/2020 88 65 - 139 mg/dL Final     Comment:               Non-fasting reference interval        09/08/2017 83 70 - 99 mg/dL      BUN, Bld   Date Value Ref Range Status   01/20/2020 14 7 - 25 mg/dL Final     Creatinine   Date Value Ref Range Status   01/20/2020 0.98 0.50 - 0.99 mg/dL Final     Comment:     For patients >49 years of age, the  reference limit  for Creatinine is approximately 13% higher for people  identified as -American.        09/08/2017 0.79 0.60 - 1.40 mg/dL      Calcium   Date Value Ref Range Status   01/20/2020 9.0 8.6 - 10.4 mg/dL Final     Total Protein   Date Value Ref Range Status   01/20/2020 6.5 6.1 - 8.1 g/dL Final     Albumin   Date Value Ref Range Status   01/20/2020 3.7 3.6 - 5.1 g/dL Final   09/08/2017 4.1 3.1 - 4.7 g/dL      Total Bilirubin   Date Value Ref Range Status   01/20/2020 0.2 0.2 - 1.2 mg/dL Final     Alkaline Phosphatase   Date Value Ref Range Status   01/20/2020 73 33 - 130 U/L Final     AST   Date Value Ref Range Status   01/20/2020 14 10 - 35 U/L Final     ALT   Date Value Ref Range Status   01/20/2020 14 6 - 29 U/L Final     eGFR if    Date Value Ref Range Status   01/20/2020 73 > OR = 60 mL/min/1.73m2 Final     eGFR if non    Date Value Ref Range Status   01/20/2020 63 > OR = 60 mL/min/1.73m2 Final               Radiology/Diagnostic Studies:    Mammo July 22nd 2019 on chart      CXR  7/22/2019 on chart    I have reviewed all available lab results and radiology reports.    Assessment/Plan:   (1) 59 y.o. female with diagnosis of DCIS who has been referred by Dr Coulter for oncology evaluation  - followed by Dr Waldo Padilla with GYN  - positive family history of breast cancer (2nd degree relatives)  - DCIS with no invasive component  - ER+/WV+  - previously discussed the prognostics of DCIS especially the up to 25% risk of breast cancer development in patients who do not have resective surgery  - Dr Coulter with s/p lumpectomy on 9/13/2017; required radiation afterwards to provide equivocal survival benefit when compared to patient who have complete mastectomies  - I discussed the recommendation for postoperative radiation and she completed XRT on Nov 6th 2017  -  Previously discussed antihormone therapy with tamoxifen, and also discussed the side-effect profile and  the need to f/u with GYN yearly  - mammo 7/22/2019 was negative with benign findings     (2) Irritable bowel syndrome     (3) HTN - followed by PCP      1. Ductal carcinoma in situ (DCIS) of left breast     2. Use of tamoxifen (Nolvadex)           PLAN:  1. Continue tamoxifen; check up to date labs every 6 months (encouraged compliance)  2. F/u with PCP, GenSurg, Rad/onc, GYN   3. Mammo is due again this July 2020    4. RTC in 6 months  Fax note to  Jean-Pierre Waller MD, Patricia Coulter R. Eddington    Discussion:       COVID-19 Discussion:    I had long discussion with patient and any applicable family about the COVID-19 coronavirus epidemic and the recommended precautions with regard to cancer and/or hematology patients. I have re-iterated the CDC recommendations for adequate hand washing, use of hand -like products, and coughing into elbow, etc. In addition, especially for our patients who are on chemotherapy and/or our otherwise immunocompromised patients, I have recommended avoidance of crowds, including movie theaters, restaurants, churches, etc. I have recommended avoidance of any sick or symptomatic family members and/or friends. I have also recommended avoidance of any raw and unwashed food products, and general avoidance of food items that have not been prepared by themselves. The patient has been asked to call us immediately with any symptom developments, issues, questions or other general concerns.     I have explained all of the above in detail and the patient understands all of the current recommendation(s). I have answered all of their questions to the best of my ability and to their complete satisfaction.   The patient is to continue with the current management plan.            Electronically signed by Festus Walter MD        Answers for HPI/ROS submitted by the patient on 7/28/2020   appetite change : No  unexpected weight change: No  visual disturbance: No  cough: No  shortness of  breath: No  chest pain: No  abdominal pain: No  diarrhea: No  frequency: No  back pain: No  rash: No  headaches: No  adenopathy: No  nervous/ anxious: No

## 2020-07-30 ENCOUNTER — OFFICE VISIT (OUTPATIENT)
Dept: HEMATOLOGY/ONCOLOGY | Facility: CLINIC | Age: 60
End: 2020-07-30
Payer: COMMERCIAL

## 2020-07-30 VITALS
DIASTOLIC BLOOD PRESSURE: 85 MMHG | HEART RATE: 75 BPM | TEMPERATURE: 97 F | RESPIRATION RATE: 18 BRPM | WEIGHT: 175.69 LBS | SYSTOLIC BLOOD PRESSURE: 167 MMHG | HEIGHT: 63 IN | BODY MASS INDEX: 31.13 KG/M2

## 2020-07-30 DIAGNOSIS — Z79.810 USE OF TAMOXIFEN (NOLVADEX): ICD-10-CM

## 2020-07-30 DIAGNOSIS — D05.12 DUCTAL CARCINOMA IN SITU OF LEFT BREAST: Primary | ICD-10-CM

## 2020-07-30 DIAGNOSIS — D05.12 DUCTAL CARCINOMA IN SITU (DCIS) OF LEFT BREAST: Primary | ICD-10-CM

## 2020-07-30 PROCEDURE — 3008F PR BODY MASS INDEX (BMI) DOCUMENTED: ICD-10-PCS | Mod: S$GLB,,, | Performed by: INTERNAL MEDICINE

## 2020-07-30 PROCEDURE — 99214 OFFICE O/P EST MOD 30 MIN: CPT | Mod: S$GLB,,, | Performed by: INTERNAL MEDICINE

## 2020-07-30 PROCEDURE — 99214 PR OFFICE/OUTPT VISIT, EST, LEVL IV, 30-39 MIN: ICD-10-PCS | Mod: S$GLB,,, | Performed by: INTERNAL MEDICINE

## 2020-07-30 PROCEDURE — 3077F PR MOST RECENT SYSTOLIC BLOOD PRESSURE >= 140 MM HG: ICD-10-PCS | Mod: S$GLB,,, | Performed by: INTERNAL MEDICINE

## 2020-07-30 PROCEDURE — 3008F BODY MASS INDEX DOCD: CPT | Mod: S$GLB,,, | Performed by: INTERNAL MEDICINE

## 2020-07-30 PROCEDURE — 3079F DIAST BP 80-89 MM HG: CPT | Mod: S$GLB,,, | Performed by: INTERNAL MEDICINE

## 2020-07-30 PROCEDURE — 3077F SYST BP >= 140 MM HG: CPT | Mod: S$GLB,,, | Performed by: INTERNAL MEDICINE

## 2020-07-30 PROCEDURE — 3079F PR MOST RECENT DIASTOLIC BLOOD PRESSURE 80-89 MM HG: ICD-10-PCS | Mod: S$GLB,,, | Performed by: INTERNAL MEDICINE

## 2020-07-31 LAB
ALBUMIN SERPL-MCNC: 3.9 G/DL (ref 3.6–5.1)
ALBUMIN/GLOB SERPL: 1.3 (CALC) (ref 1–2.5)
ALP SERPL-CCNC: 62 U/L (ref 37–153)
ALT SERPL-CCNC: 12 U/L (ref 6–29)
AST SERPL-CCNC: 15 U/L (ref 10–35)
BASOPHILS # BLD AUTO: 18 CELLS/UL (ref 0–200)
BASOPHILS NFR BLD AUTO: 0.4 %
BILIRUB SERPL-MCNC: 0.2 MG/DL (ref 0.2–1.2)
BUN SERPL-MCNC: 13 MG/DL (ref 7–25)
BUN/CREAT SERPL: NORMAL (CALC) (ref 6–22)
CALCIUM SERPL-MCNC: 9.6 MG/DL (ref 8.6–10.4)
CHLORIDE SERPL-SCNC: 107 MMOL/L (ref 98–110)
CO2 SERPL-SCNC: 27 MMOL/L (ref 20–32)
CREAT SERPL-MCNC: 0.79 MG/DL (ref 0.5–0.99)
EOSINOPHIL # BLD AUTO: 59 CELLS/UL (ref 15–500)
EOSINOPHIL NFR BLD AUTO: 1.3 %
ERYTHROCYTE [DISTWIDTH] IN BLOOD BY AUTOMATED COUNT: 14.4 % (ref 11–15)
GFRSERPLBLD MDRD-ARVRAT: 81 ML/MIN/1.73M2
GLOBULIN SER CALC-MCNC: 2.9 G/DL (CALC) (ref 1.9–3.7)
GLUCOSE SERPL-MCNC: 90 MG/DL (ref 65–99)
HCT VFR BLD AUTO: 37.6 % (ref 35–45)
HGB BLD-MCNC: 11.6 G/DL (ref 11.7–15.5)
LYMPHOCYTES # BLD AUTO: 1224 CELLS/UL (ref 850–3900)
LYMPHOCYTES NFR BLD AUTO: 27.2 %
MCH RBC QN AUTO: 28.3 PG (ref 27–33)
MCHC RBC AUTO-ENTMCNC: 30.9 G/DL (ref 32–36)
MCV RBC AUTO: 91.7 FL (ref 80–100)
MONOCYTES # BLD AUTO: 297 CELLS/UL (ref 200–950)
MONOCYTES NFR BLD AUTO: 6.6 %
NEUTROPHILS # BLD AUTO: 2903 CELLS/UL (ref 1500–7800)
NEUTROPHILS NFR BLD AUTO: 64.5 %
PLATELET # BLD AUTO: 248 THOUSAND/UL (ref 140–400)
PMV BLD REES-ECKER: 9.4 FL (ref 7.5–12.5)
POTASSIUM SERPL-SCNC: 4.8 MMOL/L (ref 3.5–5.3)
PROT SERPL-MCNC: 6.8 G/DL (ref 6.1–8.1)
RBC # BLD AUTO: 4.1 MILLION/UL (ref 3.8–5.1)
SODIUM SERPL-SCNC: 143 MMOL/L (ref 135–146)
WBC # BLD AUTO: 4.5 THOUSAND/UL (ref 3.8–10.8)

## 2020-08-11 ENCOUNTER — TELEPHONE (OUTPATIENT)
Dept: HEMATOLOGY/ONCOLOGY | Facility: CLINIC | Age: 60
End: 2020-08-11

## 2020-08-11 NOTE — TELEPHONE ENCOUNTER
----- Message from Festus Walter MD sent at 8/11/2020 12:26 PM CDT -----  Please call patient with the good report    ----- Message -----  From: Ngozi Owens  Sent: 8/11/2020  11:42 AM CDT  To: Festus Walter MD    dIAGNOSTIC MAMMOGRAM 8/10/20

## 2020-09-04 ENCOUNTER — OFFICE VISIT (OUTPATIENT)
Dept: FAMILY MEDICINE | Facility: CLINIC | Age: 60
End: 2020-09-04
Payer: COMMERCIAL

## 2020-09-04 VITALS
SYSTOLIC BLOOD PRESSURE: 130 MMHG | DIASTOLIC BLOOD PRESSURE: 76 MMHG | RESPIRATION RATE: 17 BRPM | OXYGEN SATURATION: 98 % | TEMPERATURE: 98 F | WEIGHT: 174 LBS | HEIGHT: 63 IN | HEART RATE: 61 BPM | BODY MASS INDEX: 30.83 KG/M2

## 2020-09-04 DIAGNOSIS — Z00.00 PREVENTATIVE HEALTH CARE: ICD-10-CM

## 2020-09-04 DIAGNOSIS — L24.89 IRRITANT CONTACT DERMATITIS DUE TO OTHER AGENTS: ICD-10-CM

## 2020-09-04 DIAGNOSIS — L30.4 INTERTRIGO: Primary | ICD-10-CM

## 2020-09-04 DIAGNOSIS — Z23 IMMUNIZATION DUE: ICD-10-CM

## 2020-09-04 DIAGNOSIS — I10 HYPERTENSION, UNSPECIFIED TYPE: ICD-10-CM

## 2020-09-04 PROBLEM — L24.9 IRRITANT CONTACT DERMATITIS: Status: ACTIVE | Noted: 2020-09-04

## 2020-09-04 PROCEDURE — 99214 PR OFFICE/OUTPT VISIT, EST, LEVL IV, 30-39 MIN: ICD-10-PCS | Mod: S$GLB,,, | Performed by: FAMILY MEDICINE

## 2020-09-04 PROCEDURE — 3075F SYST BP GE 130 - 139MM HG: CPT | Mod: S$GLB,,, | Performed by: FAMILY MEDICINE

## 2020-09-04 PROCEDURE — 99214 OFFICE O/P EST MOD 30 MIN: CPT | Mod: S$GLB,,, | Performed by: FAMILY MEDICINE

## 2020-09-04 PROCEDURE — 3075F PR MOST RECENT SYSTOLIC BLOOD PRESS GE 130-139MM HG: ICD-10-PCS | Mod: S$GLB,,, | Performed by: FAMILY MEDICINE

## 2020-09-04 PROCEDURE — 3008F PR BODY MASS INDEX (BMI) DOCUMENTED: ICD-10-PCS | Mod: S$GLB,,, | Performed by: FAMILY MEDICINE

## 2020-09-04 PROCEDURE — 3078F DIAST BP <80 MM HG: CPT | Mod: S$GLB,,, | Performed by: FAMILY MEDICINE

## 2020-09-04 PROCEDURE — 3078F PR MOST RECENT DIASTOLIC BLOOD PRESSURE < 80 MM HG: ICD-10-PCS | Mod: S$GLB,,, | Performed by: FAMILY MEDICINE

## 2020-09-04 PROCEDURE — 3008F BODY MASS INDEX DOCD: CPT | Mod: S$GLB,,, | Performed by: FAMILY MEDICINE

## 2020-09-04 RX ORDER — METHYLPREDNISOLONE 4 MG/1
TABLET ORAL
Qty: 1 PACKAGE | Refills: 0 | Status: SHIPPED | OUTPATIENT
Start: 2020-09-04 | End: 2021-01-12

## 2020-09-04 RX ORDER — NYSTATIN AND TRIAMCINOLONE ACETONIDE 100000; 1 [USP'U]/G; MG/G
OINTMENT TOPICAL 2 TIMES DAILY
Qty: 60 G | Refills: 3 | Status: SHIPPED | OUTPATIENT
Start: 2020-09-04 | End: 2022-04-07

## 2020-09-04 RX ORDER — ZOSTER VACCINE RECOMBINANT, ADJUVANTED 50 MCG/0.5
0.5 KIT INTRAMUSCULAR ONCE
Qty: 1 EACH | Refills: 0 | Status: SHIPPED | OUTPATIENT
Start: 2020-09-04 | End: 2020-09-04

## 2020-09-04 NOTE — PROGRESS NOTES
Subjective:       Patient ID: Catrachita Johnson is a 60 y.o. female.    Chief Complaint: Rash (R arm )      Patient with a very sensitive linear or bumps on her right forearm, has spread up into the elbow as well, all raised in almost like a blister.  He has developed a rash under the breast as well with some redness and irritation.  Cologuard was done in July and reported to the patient negative    Rash  This is a new problem. The current episode started yesterday. The problem has been gradually worsening since onset. The rash is characterized by blistering, burning and dryness. Treatments tried: Topical tea tree are.       Allergies and Medications:   Review of patient's allergies indicates:   Allergen Reactions    Bactrim [sulfamethoxazole-trimethoprim]     Cherries     Tetracyclines      Current Outpatient Medications   Medication Sig Dispense Refill    enalapril (VASOTEC) 10 MG tablet TAKE 1 TABLET(10 MG) BY MOUTH EVERY DAY 90 tablet 1    tamoxifen (NOLVADEX) 20 MG Tab TAKE 1 TABLET(20 MG) BY MOUTH EVERY DAY 30 tablet 11    methylPREDNISolone (MEDROL DOSEPACK) 4 mg tablet use as directed 1 Package 0    nystatin-triamcinolone (MYCOLOG) ointment Apply topically 2 (two) times daily. for 14 days 60 g 3    tetanus and diphther. tox, PF, (TENIVAC, PF,) 5-2 Lf unit/0.5 mL Syrg Inject 0.5 mLs into the muscle once. for 1 dose 0.5 mL 0    varicella-zoster gE-AS01B, PF, (SHINGRIX, PF,) 50 mcg/0.5 mL injection Inject 0.5 mLs into the muscle once. for 1 dose 1 each 0     No current facility-administered medications for this visit.        Family History:   Family History   Problem Relation Age of Onset    Stroke Mother     No Known Problems Father     Stroke Maternal Grandmother        Social History:   Social History     Socioeconomic History    Marital status:      Spouse name: Not on file    Number of children: Not on file    Years of education: Not on file    Highest education level: Not on file    Occupational History    Not on file   Social Needs    Financial resource strain: Not hard at all    Food insecurity     Worry: Never true     Inability: Never true    Transportation needs     Medical: No     Non-medical: No   Tobacco Use    Smoking status: Never Smoker    Smokeless tobacco: Never Used   Substance and Sexual Activity    Alcohol use: No     Frequency: Never     Drinks per session: Patient refused     Binge frequency: Never    Drug use: No    Sexual activity: Not on file   Lifestyle    Physical activity     Days per week: 5 days     Minutes per session: 30 min    Stress: Only a little   Relationships    Social connections     Talks on phone: Once a week     Gets together: Never     Attends Hoahaoism service: Not on file     Active member of club or organization: No     Attends meetings of clubs or organizations: Never     Relationship status:    Other Topics Concern    Not on file   Social History Narrative    Not on file       Review of Systems   Skin: Positive for rash.       Objective:     Vitals:    09/04/20 1025   BP: 130/76   Pulse: 61   Resp: 17   Temp: 98.1 °F (36.7 °C)        Physical Exam  Chest:       Musculoskeletal:        Arms:          Assessment:       1. Intertrigo    2. Irritant contact dermatitis due to other agents    3. Hypertension, unspecified type    4. Preventative health care    5. Immunization due        Plan:       Catrachita was seen today for rash.    Diagnoses and all orders for this visit:    Intertrigo  -     nystatin-triamcinolone (MYCOLOG) ointment; Apply topically 2 (two) times daily. for 14 days    Irritant contact dermatitis due to other agents  -     methylPREDNISolone (MEDROL DOSEPACK) 4 mg tablet; use as directed    Hypertension, unspecified type  -     Lipid Panel; Future  -     Comprehensive metabolic panel; Future    Preventative health care  -     Comprehensive metabolic panel; Future  -     HIV 1/2 Ag/Ab (4th Gen); Future  -     Hepatitis  C Antibody; Future    Immunization due  -     varicella-zoster gE-AS01B, PF, (SHINGRIX, PF,) 50 mcg/0.5 mL injection; Inject 0.5 mLs into the muscle once. for 1 dose  -     tetanus and diphther. tox, PF, (TENIVAC, PF,) 5-2 Lf unit/0.5 mL Syrg; Inject 0.5 mLs into the muscle once. for 1 dose         Follow up in about 4 months (around 1/4/2021), or if symptoms worsen or fail to improve, for annual.

## 2021-01-12 ENCOUNTER — TELEPHONE (OUTPATIENT)
Dept: FAMILY MEDICINE | Facility: CLINIC | Age: 61
End: 2021-01-12

## 2021-01-12 ENCOUNTER — OFFICE VISIT (OUTPATIENT)
Dept: FAMILY MEDICINE | Facility: CLINIC | Age: 61
End: 2021-01-12
Payer: COMMERCIAL

## 2021-01-12 VITALS
SYSTOLIC BLOOD PRESSURE: 130 MMHG | RESPIRATION RATE: 16 BRPM | BODY MASS INDEX: 31.07 KG/M2 | DIASTOLIC BLOOD PRESSURE: 88 MMHG | OXYGEN SATURATION: 99 % | TEMPERATURE: 97 F | HEART RATE: 65 BPM | HEIGHT: 63 IN | WEIGHT: 175.38 LBS

## 2021-01-12 DIAGNOSIS — I10 HYPERTENSION, UNSPECIFIED TYPE: Primary | ICD-10-CM

## 2021-01-12 DIAGNOSIS — Z00.00 PREVENTATIVE HEALTH CARE: ICD-10-CM

## 2021-01-12 DIAGNOSIS — Z23 IMMUNIZATION DUE: ICD-10-CM

## 2021-01-12 PROCEDURE — 99214 PR OFFICE/OUTPT VISIT, EST, LEVL IV, 30-39 MIN: ICD-10-PCS | Mod: S$GLB,,, | Performed by: FAMILY MEDICINE

## 2021-01-12 PROCEDURE — 3079F PR MOST RECENT DIASTOLIC BLOOD PRESSURE 80-89 MM HG: ICD-10-PCS | Mod: S$GLB,,, | Performed by: FAMILY MEDICINE

## 2021-01-12 PROCEDURE — 99214 OFFICE O/P EST MOD 30 MIN: CPT | Mod: S$GLB,,, | Performed by: FAMILY MEDICINE

## 2021-01-12 PROCEDURE — 3008F PR BODY MASS INDEX (BMI) DOCUMENTED: ICD-10-PCS | Mod: S$GLB,,, | Performed by: FAMILY MEDICINE

## 2021-01-12 PROCEDURE — 3075F PR MOST RECENT SYSTOLIC BLOOD PRESS GE 130-139MM HG: ICD-10-PCS | Mod: S$GLB,,, | Performed by: FAMILY MEDICINE

## 2021-01-12 PROCEDURE — 3079F DIAST BP 80-89 MM HG: CPT | Mod: S$GLB,,, | Performed by: FAMILY MEDICINE

## 2021-01-12 PROCEDURE — 3075F SYST BP GE 130 - 139MM HG: CPT | Mod: S$GLB,,, | Performed by: FAMILY MEDICINE

## 2021-01-12 PROCEDURE — 3008F BODY MASS INDEX DOCD: CPT | Mod: S$GLB,,, | Performed by: FAMILY MEDICINE

## 2021-01-12 RX ORDER — INFLUENZA A VIRUS A/VICTORIA/2454/2019 IVR-207 (H1N1) ANTIGEN (PROPIOLACTONE INACTIVATED), INFLUENZA A VIRUS A/HONG KONG/2671/2019 IVR-208 (H3N2) ANTIGEN (PROPIOLACTONE INACTIVATED), INFLUENZA B VIRUS B/VICTORIA/705/2018 BVR-11 ANTIGEN (PROPIOLACTONE INACTIVATED), INFLUENZA B VIRUS B/PHUKET/3073/2013 BVR-1B ANTIGEN (PROPIOLACTONE INACTIVATED) 15; 15; 15; 15 UG/.5ML; UG/.5ML; UG/.5ML; UG/.5ML
INJECTION, SUSPENSION INTRAMUSCULAR
COMMUNITY
Start: 2020-10-15 | End: 2022-01-20

## 2021-01-13 ENCOUNTER — TELEPHONE (OUTPATIENT)
Dept: FAMILY MEDICINE | Facility: CLINIC | Age: 61
End: 2021-01-13

## 2021-01-19 ENCOUNTER — CLINICAL SUPPORT (OUTPATIENT)
Dept: FAMILY MEDICINE | Facility: CLINIC | Age: 61
End: 2021-01-19
Payer: COMMERCIAL

## 2021-01-19 DIAGNOSIS — Z23 IMMUNIZATION DUE: Primary | ICD-10-CM

## 2021-01-19 PROCEDURE — 90471 IMMUNIZATION ADMIN: CPT | Mod: S$GLB,,, | Performed by: FAMILY MEDICINE

## 2021-01-19 PROCEDURE — 90471 TDAP VACCINE GREATER THAN OR EQUAL TO 7YO IM: ICD-10-PCS | Mod: S$GLB,,, | Performed by: FAMILY MEDICINE

## 2021-01-19 PROCEDURE — 90715 TDAP VACCINE GREATER THAN OR EQUAL TO 7YO IM: ICD-10-PCS | Mod: S$GLB,,, | Performed by: FAMILY MEDICINE

## 2021-01-19 PROCEDURE — 90715 TDAP VACCINE 7 YRS/> IM: CPT | Mod: S$GLB,,, | Performed by: FAMILY MEDICINE

## 2021-02-01 ENCOUNTER — TELEPHONE (OUTPATIENT)
Dept: HEMATOLOGY/ONCOLOGY | Facility: CLINIC | Age: 61
End: 2021-02-01

## 2021-02-03 ENCOUNTER — OFFICE VISIT (OUTPATIENT)
Dept: HEMATOLOGY/ONCOLOGY | Facility: CLINIC | Age: 61
End: 2021-02-03
Payer: COMMERCIAL

## 2021-02-03 VITALS
TEMPERATURE: 98 F | BODY MASS INDEX: 31.55 KG/M2 | DIASTOLIC BLOOD PRESSURE: 87 MMHG | RESPIRATION RATE: 17 BRPM | WEIGHT: 178.13 LBS | HEART RATE: 71 BPM | SYSTOLIC BLOOD PRESSURE: 140 MMHG

## 2021-02-03 DIAGNOSIS — D05.12 DUCTAL CARCINOMA IN SITU (DCIS) OF LEFT BREAST: Primary | ICD-10-CM

## 2021-02-03 DIAGNOSIS — Z79.810 USE OF TAMOXIFEN (NOLVADEX): ICD-10-CM

## 2021-02-03 PROCEDURE — 3079F PR MOST RECENT DIASTOLIC BLOOD PRESSURE 80-89 MM HG: ICD-10-PCS | Mod: S$GLB,,, | Performed by: INTERNAL MEDICINE

## 2021-02-03 PROCEDURE — 1126F PR PAIN SEVERITY QUANTIFIED, NO PAIN PRESENT: ICD-10-PCS | Mod: S$GLB,,, | Performed by: INTERNAL MEDICINE

## 2021-02-03 PROCEDURE — 3077F PR MOST RECENT SYSTOLIC BLOOD PRESSURE >= 140 MM HG: ICD-10-PCS | Mod: S$GLB,,, | Performed by: INTERNAL MEDICINE

## 2021-02-03 PROCEDURE — 99213 OFFICE O/P EST LOW 20 MIN: CPT | Mod: S$GLB,,, | Performed by: INTERNAL MEDICINE

## 2021-02-03 PROCEDURE — 3077F SYST BP >= 140 MM HG: CPT | Mod: S$GLB,,, | Performed by: INTERNAL MEDICINE

## 2021-02-03 PROCEDURE — 99213 PR OFFICE/OUTPT VISIT, EST, LEVL III, 20-29 MIN: ICD-10-PCS | Mod: S$GLB,,, | Performed by: INTERNAL MEDICINE

## 2021-02-03 PROCEDURE — 3079F DIAST BP 80-89 MM HG: CPT | Mod: S$GLB,,, | Performed by: INTERNAL MEDICINE

## 2021-02-03 PROCEDURE — 3008F BODY MASS INDEX DOCD: CPT | Mod: S$GLB,,, | Performed by: INTERNAL MEDICINE

## 2021-02-03 PROCEDURE — 1126F AMNT PAIN NOTED NONE PRSNT: CPT | Mod: S$GLB,,, | Performed by: INTERNAL MEDICINE

## 2021-02-03 PROCEDURE — 3008F PR BODY MASS INDEX (BMI) DOCUMENTED: ICD-10-PCS | Mod: S$GLB,,, | Performed by: INTERNAL MEDICINE

## 2021-04-29 ENCOUNTER — PATIENT MESSAGE (OUTPATIENT)
Dept: RESEARCH | Facility: HOSPITAL | Age: 61
End: 2021-04-29

## 2021-08-03 ENCOUNTER — OFFICE VISIT (OUTPATIENT)
Dept: HEMATOLOGY/ONCOLOGY | Facility: CLINIC | Age: 61
End: 2021-08-03
Payer: COMMERCIAL

## 2021-08-03 VITALS
DIASTOLIC BLOOD PRESSURE: 88 MMHG | RESPIRATION RATE: 18 BRPM | BODY MASS INDEX: 30.65 KG/M2 | HEIGHT: 63 IN | WEIGHT: 173 LBS | HEART RATE: 78 BPM | SYSTOLIC BLOOD PRESSURE: 166 MMHG

## 2021-08-03 DIAGNOSIS — D05.12 DUCTAL CARCINOMA IN SITU (DCIS) OF LEFT BREAST: Primary | ICD-10-CM

## 2021-08-03 DIAGNOSIS — Z79.810 USE OF TAMOXIFEN (NOLVADEX): ICD-10-CM

## 2021-08-03 PROCEDURE — 3008F PR BODY MASS INDEX (BMI) DOCUMENTED: ICD-10-PCS | Mod: S$GLB,,, | Performed by: INTERNAL MEDICINE

## 2021-08-03 PROCEDURE — 3077F PR MOST RECENT SYSTOLIC BLOOD PRESSURE >= 140 MM HG: ICD-10-PCS | Mod: S$GLB,,, | Performed by: INTERNAL MEDICINE

## 2021-08-03 PROCEDURE — 3077F SYST BP >= 140 MM HG: CPT | Mod: S$GLB,,, | Performed by: INTERNAL MEDICINE

## 2021-08-03 PROCEDURE — 1160F PR REVIEW ALL MEDS BY PRESCRIBER/CLIN PHARMACIST DOCUMENTED: ICD-10-PCS | Mod: S$GLB,,, | Performed by: INTERNAL MEDICINE

## 2021-08-03 PROCEDURE — 99214 OFFICE O/P EST MOD 30 MIN: CPT | Mod: S$GLB,,, | Performed by: INTERNAL MEDICINE

## 2021-08-03 PROCEDURE — 3079F DIAST BP 80-89 MM HG: CPT | Mod: S$GLB,,, | Performed by: INTERNAL MEDICINE

## 2021-08-03 PROCEDURE — 1126F PR PAIN SEVERITY QUANTIFIED, NO PAIN PRESENT: ICD-10-PCS | Mod: S$GLB,,, | Performed by: INTERNAL MEDICINE

## 2021-08-03 PROCEDURE — 3008F BODY MASS INDEX DOCD: CPT | Mod: S$GLB,,, | Performed by: INTERNAL MEDICINE

## 2021-08-03 PROCEDURE — 3079F PR MOST RECENT DIASTOLIC BLOOD PRESSURE 80-89 MM HG: ICD-10-PCS | Mod: S$GLB,,, | Performed by: INTERNAL MEDICINE

## 2021-08-03 PROCEDURE — 99214 PR OFFICE/OUTPT VISIT, EST, LEVL IV, 30-39 MIN: ICD-10-PCS | Mod: S$GLB,,, | Performed by: INTERNAL MEDICINE

## 2021-08-03 PROCEDURE — 1126F AMNT PAIN NOTED NONE PRSNT: CPT | Mod: S$GLB,,, | Performed by: INTERNAL MEDICINE

## 2021-08-03 PROCEDURE — 1160F RVW MEDS BY RX/DR IN RCRD: CPT | Mod: S$GLB,,, | Performed by: INTERNAL MEDICINE

## 2021-08-05 LAB
ALBUMIN SERPL-MCNC: 4 G/DL (ref 3.6–5.1)
ALBUMIN/GLOB SERPL: 1.4 (CALC) (ref 1–2.5)
ALP SERPL-CCNC: 59 U/L (ref 37–153)
ALT SERPL-CCNC: 12 U/L (ref 6–29)
AST SERPL-CCNC: 15 U/L (ref 10–35)
BASOPHILS # BLD AUTO: 20 CELLS/UL (ref 0–200)
BASOPHILS NFR BLD AUTO: 0.4 %
BILIRUB SERPL-MCNC: 0.3 MG/DL (ref 0.2–1.2)
BUN SERPL-MCNC: 13 MG/DL (ref 7–25)
BUN/CREAT SERPL: NORMAL (CALC) (ref 6–22)
CALCIUM SERPL-MCNC: 9.1 MG/DL (ref 8.6–10.4)
CHLORIDE SERPL-SCNC: 106 MMOL/L (ref 98–110)
CO2 SERPL-SCNC: 29 MMOL/L (ref 20–32)
CREAT SERPL-MCNC: 0.84 MG/DL (ref 0.5–0.99)
EOSINOPHIL # BLD AUTO: 82 CELLS/UL (ref 15–500)
EOSINOPHIL NFR BLD AUTO: 1.6 %
ERYTHROCYTE [DISTWIDTH] IN BLOOD BY AUTOMATED COUNT: 13.3 % (ref 11–15)
FERRITIN SERPL-MCNC: 81 NG/ML (ref 16–288)
GLOBULIN SER CALC-MCNC: 2.8 G/DL (CALC) (ref 1.9–3.7)
GLUCOSE SERPL-MCNC: 93 MG/DL (ref 65–99)
HCT VFR BLD AUTO: 38.3 % (ref 35–45)
HGB BLD-MCNC: 12.2 G/DL (ref 11.7–15.5)
IRON SATN MFR SERPL: 30 % (CALC) (ref 16–45)
IRON SERPL-MCNC: 104 MCG/DL (ref 45–160)
LYMPHOCYTES # BLD AUTO: 1668 CELLS/UL (ref 850–3900)
LYMPHOCYTES NFR BLD AUTO: 32.7 %
MCH RBC QN AUTO: 28 PG (ref 27–33)
MCHC RBC AUTO-ENTMCNC: 31.9 G/DL (ref 32–36)
MCV RBC AUTO: 87.8 FL (ref 80–100)
MONOCYTES # BLD AUTO: 352 CELLS/UL (ref 200–950)
MONOCYTES NFR BLD AUTO: 6.9 %
NEUTROPHILS # BLD AUTO: 2978 CELLS/UL (ref 1500–7800)
NEUTROPHILS NFR BLD AUTO: 58.4 %
PLATELET # BLD AUTO: 255 THOUSAND/UL (ref 140–400)
PMV BLD REES-ECKER: 9.7 FL (ref 7.5–12.5)
POTASSIUM SERPL-SCNC: 4.5 MMOL/L (ref 3.5–5.3)
PROT SERPL-MCNC: 6.8 G/DL (ref 6.1–8.1)
RBC # BLD AUTO: 4.36 MILLION/UL (ref 3.8–5.1)
SODIUM SERPL-SCNC: 141 MMOL/L (ref 135–146)
TIBC SERPL-MCNC: 351 MCG/DL (CALC) (ref 250–450)
WBC # BLD AUTO: 5.1 THOUSAND/UL (ref 3.8–10.8)

## 2021-11-09 DIAGNOSIS — I10 HYPERTENSION, UNSPECIFIED TYPE: ICD-10-CM

## 2021-11-09 RX ORDER — ENALAPRIL MALEATE 10 MG/1
TABLET ORAL
Qty: 90 TABLET | Refills: 0 | Status: SHIPPED | OUTPATIENT
Start: 2021-11-09 | End: 2022-02-07

## 2022-01-10 ENCOUNTER — OFFICE VISIT (OUTPATIENT)
Dept: FAMILY MEDICINE | Facility: CLINIC | Age: 62
End: 2022-01-10
Payer: COMMERCIAL

## 2022-01-10 VITALS
SYSTOLIC BLOOD PRESSURE: 128 MMHG | WEIGHT: 178.88 LBS | OXYGEN SATURATION: 96 % | RESPIRATION RATE: 16 BRPM | HEIGHT: 63 IN | HEART RATE: 76 BPM | BODY MASS INDEX: 31.7 KG/M2 | DIASTOLIC BLOOD PRESSURE: 84 MMHG | TEMPERATURE: 99 F

## 2022-01-10 DIAGNOSIS — J02.9 PHARYNGITIS, UNSPECIFIED ETIOLOGY: ICD-10-CM

## 2022-01-10 DIAGNOSIS — Z12.11 COLON CANCER SCREENING: ICD-10-CM

## 2022-01-10 DIAGNOSIS — L24.89 IRRITANT CONTACT DERMATITIS DUE TO OTHER AGENTS: Primary | ICD-10-CM

## 2022-01-10 DIAGNOSIS — Z00.00 PREVENTATIVE HEALTH CARE: ICD-10-CM

## 2022-01-10 LAB
CTP QC/QA: YES
CTP QC/QA: YES
S PYO RRNA THROAT QL PROBE: NEGATIVE
SARS-COV-2 RDRP RESP QL NAA+PROBE: NEGATIVE

## 2022-01-10 PROCEDURE — U0002 COVID-19 LAB TEST NON-CDC: HCPCS | Mod: QW,S$GLB,, | Performed by: FAMILY MEDICINE

## 2022-01-10 PROCEDURE — 87880 POCT RAPID STREP A: ICD-10-PCS | Mod: QW,S$GLB,, | Performed by: FAMILY MEDICINE

## 2022-01-10 PROCEDURE — 99214 OFFICE O/P EST MOD 30 MIN: CPT | Mod: 25,S$GLB,, | Performed by: FAMILY MEDICINE

## 2022-01-10 PROCEDURE — 87880 STREP A ASSAY W/OPTIC: CPT | Mod: QW,S$GLB,, | Performed by: FAMILY MEDICINE

## 2022-01-10 PROCEDURE — 3008F BODY MASS INDEX DOCD: CPT | Mod: S$GLB,,, | Performed by: FAMILY MEDICINE

## 2022-01-10 PROCEDURE — U0002: ICD-10-PCS | Mod: QW,S$GLB,, | Performed by: FAMILY MEDICINE

## 2022-01-10 PROCEDURE — 99214 PR OFFICE/OUTPT VISIT, EST, LEVL IV, 30-39 MIN: ICD-10-PCS | Mod: 25,S$GLB,, | Performed by: FAMILY MEDICINE

## 2022-01-10 PROCEDURE — 3008F PR BODY MASS INDEX (BMI) DOCUMENTED: ICD-10-PCS | Mod: S$GLB,,, | Performed by: FAMILY MEDICINE

## 2022-01-10 RX ORDER — HYDROCORTISONE 25 MG/G
CREAM TOPICAL 2 TIMES DAILY
Qty: 28 G | Refills: 4 | Status: SHIPPED | OUTPATIENT
Start: 2022-01-10 | End: 2022-09-13

## 2022-01-10 NOTE — PROGRESS NOTES
Subjective:       Patient ID: Catrachita Johnson is a 61 y.o. female.    Chief Complaint: Facial Laceration, Sore Throat, and Rash (For the past 2 weeks, legs )      Patient is here because of 2 week history of some sinus headaches mild postnasal drip and us lesions of develop on the roof of the mouth sore and cause pain when eating.  He also has scaly itchy rash on the lateral aspect of both elbows and a healing lesion on the right leg.        Sore Throat     Rash  Associated symptoms include a sore throat.       Allergies and Medications:   Review of patient's allergies indicates:   Allergen Reactions    Cherries     Tetracyclines      Current Outpatient Medications   Medication Sig Dispense Refill    AFLURIA QD 2020-21,3YR UP,,PF, 60 mcg (15 mcg x 4)/0.5 mL Syrg ADM 0.5ML IM UTD      enalapril (VASOTEC) 10 MG tablet TAKE 1 TABLET(10 MG) BY MOUTH EVERY DAY 90 tablet 0    hydrocortisone 2.5 % cream Apply topically 2 (two) times daily. 28 g 4    nystatin-triamcinolone (MYCOLOG) ointment Apply topically 2 (two) times daily. for 14 days 60 g 3    tamoxifen (NOLVADEX) 20 MG Tab TAKE 1 TABLET(20 MG) BY MOUTH EVERY DAY 30 tablet 11     No current facility-administered medications for this visit.       Family History:   Family History   Problem Relation Age of Onset    Stroke Mother     No Known Problems Father     Stroke Maternal Grandmother        Social History:   Social History     Socioeconomic History    Marital status:    Tobacco Use    Smoking status: Never Smoker    Smokeless tobacco: Never Used   Substance and Sexual Activity    Alcohol use: No    Drug use: No       Review of Systems   HENT: Positive for sore throat.    Skin: Positive for rash.       Objective:     Vitals:    01/10/22 1131   BP: 128/84   Pulse: 76   Resp: 16   Temp: 98.6 °F (37 °C)        Physical Exam  Vitals and nursing note reviewed.   Constitutional:       General: She is not in acute distress.     Appearance: She is  well-developed and well-nourished. She is not diaphoretic.   HENT:      Head: Normocephalic and atraumatic.      Right Ear: Hearing, tympanic membrane, ear canal and external ear normal. No decreased hearing noted. No drainage, swelling or tenderness. No middle ear effusion. No foreign body. No hemotympanum. Tympanic membrane is not injected, scarred, perforated, erythematous, retracted or bulging. Tympanic membrane has normal mobility.      Left Ear: Hearing, tympanic membrane, ear canal and external ear normal. No decreased hearing noted. No drainage, swelling or tenderness.  No middle ear effusion. No foreign body. No hemotympanum. Tympanic membrane is not injected, scarred, perforated, erythematous, retracted or bulging. Tympanic membrane has normal mobility.      Nose: Nose normal. No nasal deformity, septal deviation, laceration, sinus tenderness, mucosal edema or rhinorrhea.      Right Sinus: No maxillary sinus tenderness or frontal sinus tenderness.      Left Sinus: No maxillary sinus tenderness or frontal sinus tenderness.      Mouth/Throat:      Mouth: Oropharynx is clear and moist.      Dentition: Normal dentition.      Pharynx: Uvula midline. No oropharyngeal exudate, posterior oropharyngeal edema or posterior oropharyngeal erythema.      Tonsils: No tonsillar abscesses.   Eyes:      General: No scleral icterus.        Right eye: No discharge.         Left eye: No discharge.      Extraocular Movements: EOM normal.      Conjunctiva/sclera: Conjunctivae normal.      Pupils: Pupils are equal, round, and reactive to light.   Neck:      Thyroid: No thyromegaly.   Cardiovascular:      Rate and Rhythm: Normal rate and regular rhythm.      Pulses: Intact distal pulses.      Heart sounds: Normal heart sounds. No murmur heard.  No friction rub. No gallop.    Pulmonary:      Effort: Pulmonary effort is normal. No respiratory distress.      Breath sounds: Normal breath sounds. No stridor. No wheezing, rhonchi or  rales.   Chest:      Chest wall: No tenderness.   Musculoskeletal:      Cervical back: Normal range of motion and neck supple.   Lymphadenopathy:      Cervical: No cervical adenopathy.   Skin:             COVID p.o. CT is negative p.o. CT strep is negative.  Assessment:       1. Irritant contact dermatitis due to other agents    2. Pharyngitis, unspecified etiology probably viral       Plan:       Catrachita was seen today for facial laceration, sore throat and rash.    Diagnoses and all orders for this visit:    Irritant contact dermatitis due to other agents  -     hydrocortisone 2.5 % cream; Apply topically 2 (two) times daily.    Pharyngitis, unspecified etiology  -     POCT rapid strep A  -     POCT COVID-19 Rapid Screening  Gargle with Benadryl and Maalox       Follow up in about 3 months (around 4/10/2022) for annual.

## 2022-01-20 ENCOUNTER — OFFICE VISIT (OUTPATIENT)
Dept: FAMILY MEDICINE | Facility: CLINIC | Age: 62
End: 2022-01-20
Payer: COMMERCIAL

## 2022-01-20 ENCOUNTER — PATIENT MESSAGE (OUTPATIENT)
Dept: FAMILY MEDICINE | Facility: CLINIC | Age: 62
End: 2022-01-20

## 2022-01-20 VITALS
HEIGHT: 63 IN | DIASTOLIC BLOOD PRESSURE: 84 MMHG | SYSTOLIC BLOOD PRESSURE: 130 MMHG | OXYGEN SATURATION: 98 % | RESPIRATION RATE: 20 BRPM | TEMPERATURE: 99 F | WEIGHT: 179.88 LBS | HEART RATE: 82 BPM | BODY MASS INDEX: 31.87 KG/M2

## 2022-01-20 DIAGNOSIS — R68.83 CHILLS: Primary | ICD-10-CM

## 2022-01-20 DIAGNOSIS — U07.1 COVID-19: ICD-10-CM

## 2022-01-20 LAB
CTP QC/QA: YES
CTP QC/QA: YES
POC MOLECULAR INFLUENZA A AGN: NEGATIVE
POC MOLECULAR INFLUENZA B AGN: NEGATIVE
SARS-COV-2 RDRP RESP QL NAA+PROBE: POSITIVE

## 2022-01-20 PROCEDURE — 1160F RVW MEDS BY RX/DR IN RCRD: CPT | Mod: S$GLB,,, | Performed by: NURSE PRACTITIONER

## 2022-01-20 PROCEDURE — 1160F PR REVIEW ALL MEDS BY PRESCRIBER/CLIN PHARMACIST DOCUMENTED: ICD-10-PCS | Mod: S$GLB,,, | Performed by: NURSE PRACTITIONER

## 2022-01-20 PROCEDURE — 87502 POCT INFLUENZA A/B MOLECULAR: ICD-10-PCS | Mod: QW,S$GLB,, | Performed by: NURSE PRACTITIONER

## 2022-01-20 PROCEDURE — U0002 COVID-19 LAB TEST NON-CDC: HCPCS | Mod: QW,S$GLB,, | Performed by: NURSE PRACTITIONER

## 2022-01-20 PROCEDURE — 87502 INFLUENZA DNA AMP PROBE: CPT | Mod: QW,S$GLB,, | Performed by: NURSE PRACTITIONER

## 2022-01-20 PROCEDURE — U0002: ICD-10-PCS | Mod: QW,S$GLB,, | Performed by: NURSE PRACTITIONER

## 2022-01-20 PROCEDURE — 3008F BODY MASS INDEX DOCD: CPT | Mod: S$GLB,,, | Performed by: NURSE PRACTITIONER

## 2022-01-20 PROCEDURE — 99213 PR OFFICE/OUTPT VISIT, EST, LEVL III, 20-29 MIN: ICD-10-PCS | Mod: 25,S$GLB,, | Performed by: NURSE PRACTITIONER

## 2022-01-20 PROCEDURE — 3008F PR BODY MASS INDEX (BMI) DOCUMENTED: ICD-10-PCS | Mod: S$GLB,,, | Performed by: NURSE PRACTITIONER

## 2022-01-20 PROCEDURE — 99213 OFFICE O/P EST LOW 20 MIN: CPT | Mod: 25,S$GLB,, | Performed by: NURSE PRACTITIONER

## 2022-01-20 NOTE — PATIENT INSTRUCTIONS
Patient Education       COVID-19 Discharge Instructions   About this topic   Coronavirus disease 2019 is also known as COVID-19. It is a viral illness that infects the lungs. It is caused by a virus called SARS-associated coronavirus (SARS-CoV-2).  The signs of COVID-19 most often start a few days after you have been infected. In some people, it takes longer to show signs. Others never show signs of the infection. You may have a cough, fever, shaking chills and it may be hard to breathe. You may be very tired, have muscle aches, a headache or sore throat. Some people have an upset stomach or loose stools. Others lose their sense of smell or taste. You may not have these signs all the time and they may come and go while you are sick.  The virus spreads easily through droplets when you talk, sneeze, or cough. You can pass the virus to others when you are talking close together, singing, hugging, sharing food, or shaking hands. Doctors believe the germs also survive on surfaces like tables, door handles, and telephones. However, this is not a common way that COVID-19 spreads. Doctors believe you can also spread the infection even if you dont have any symptoms, but they do not know how that happens. This is why getting vaccinated is one of the best ways to keep you healthy and slow the spread of the virus.  Some people have a mild case of COVID-19 and are able to stay at home and away from others until they feel better. Others may need to be in the hospital if they are very sick. Some people with COVID-19 can have some symptoms for weeks or months. People with COVID-19 must isolate themselves. You can start to be around others when your doctor says it is safe to do so.       What care is needed at home?   · Ask your doctor what you need to do when you go home. Make sure you ask questions if you do not understand what the doctor says.  · Drink lots of water, juice, or broth to replace fluids lost from a fever.  · You  may use cool mist humidifiers to help ease congestion and coughing.  · Use 2 to 3 pillows to prop yourself up when you lie down to make it easier to breathe and sleep.  · Do not smoke and do not drink beer, wine, and mixed drinks (alcohol).  · To lower the chance of passing the infection to others, get a COVID-19 vaccine after your infection has resolved.  · If you have not been fully vaccinated:  ? Wear a mask over your mouth and nose if you are around others who are not sick. Cloth masks work best if they have more than one layer of fabric.  ? Wash your hands often.  ? Stay home in a separate room, if possible, away from others. Only go out to get medical care.  ? Use a separate bathroom if possible.  ? Do not make food for others.  What follow-up care is needed?   · Your doctor may ask you to make visits to the office to check on your progress. Be sure to keep these visits. Make sure you wear a mask at these visits.  · If you can, tell the staff you have COVID-19 ahead of time so they can take extra care to stop the disease from spreading.  · It may take a few weeks before your health returns to normal.  What drugs may be needed?   The doctor may order drugs to:  · Help with breathing  · Help with fever  · Help with swelling in your airways and lungs  · Control coughing  · Ease a sore throat  · Help a runny or stuffy nose  Will physical activity be limited?   You may have to limit your physical activity. Talk to your doctor about the right amount of activity for you. If you have been very sick with COVID-19, it can take some time to get your strength back.  Will there be any other care needed?   Doctors do not know how long you can pass the virus on to others after you are sick. This is why it is important to stay in a separate room, if possible, when you are sick. For now, doctors are giving general guidelines for you to follow after you have been sick. Before you go around other people, you should:  · Be fever  free for 24 hours without taking any drugs to lower the fever  · Have no symptoms of cough or shortness of breath  · Wait at least 10 days after first having symptoms or your first positive test, and you need to be symptom free as above. Some experts suggest waiting 20 days if you have had a more severe infection.  Talk with your doctor about getting a COVID-19 vaccine.  What problems could happen?   · Fluid loss. This is dehydration.  · Short-term or long-term lung damage  · Heart problems  · Death  When do I need to call the doctor?   · You are having so much trouble breathing that you can only say one or two words at a time.  · You need to sit upright at all times to be able to breathe and/or cannot lie down.  · You are very confused or cannot stay awake.  · Your lips or skin start to turn blue or grey.  · You think you might be having a medical emergency. Some examples of medical emergencies are:  ? Severe chest pain.  ? Not able to speak or move normally.  · You have trouble breathing when talking or sitting still.  · You have new shortness of breath.  · You become weak or dizzy.  · You have very dark urine or do not pass urine for more than 8 hours.  · You have new or worsening COVID-19 symptoms like:  ? Fever  ? Cough  ? Feeling very tired  ? Shaking chills  ? Headache  ? Trouble swallowing  ? Throwing up  ? Loose stools  ? Reddish purple spots on your fingers or toes  Teach Back: Helping You Understand   The Teach Back Method helps you understand the information we are giving you. After you talk with the staff, tell them in your own words what you learned. This helps to make sure the staff has described each thing clearly. It also helps to explain things that may have been confusing. Before going home, make sure you can do these:  · I can tell you about my condition.  · I can tell you what may help ease my breathing.  · I can tell you what I can do to help avoid passing the infection to others.  · I can tell  you what I will do if I have trouble breathing; feel sleepy or confused; or my fingertips, fingernails, skin, or lips are blue.  Where can I learn more?   Centers for Disease Control and Prevention  https://www.cdc.gov/coronavirus/2019-ncov/about/index.html   Centers for Disease Control and Prevention  https://www.cdc.gov/coronavirus/2019-ncov/hcp/disposition-in-home-patients.html   World Health Organization  https://www.who.int/news-room/q-a-detail/u-n-cmmuqsmbzekuz   Last Reviewed Date   2021-10-05  Consumer Information Use and Disclaimer   This information is not specific medical advice and does not replace information you receive from your health care provider. This is only a brief summary of general information. It does NOT include all information about conditions, illnesses, injuries, tests, procedures, treatments, therapies, discharge instructions or life-style choices that may apply to you. You must talk with your health care provider for complete information about your health and treatment options. This information should not be used to decide whether or not to accept your health care providers advice, instructions or recommendations. Only your health care provider has the knowledge and training to provide advice that is right for you.  Copyright   Copyright © 2021 [employer] and its affiliates and/or licensors. All rights reserved.

## 2022-01-20 NOTE — PROGRESS NOTES
SUBJECTIVE:      Patient ID: Catrachita Johnson is a 61 y.o. female.    Chief Complaint: Nasal Congestion, Sore Throat, and Chills    URI   This is a new problem. The current episode started yesterday. The problem has been gradually worsening. There has been no fever. Associated symptoms include congestion, coughing (nonproductive ), headaches, rhinorrhea, a sore throat and swollen glands. Pertinent negatives include no abdominal pain, chest pain, diarrhea, dysuria, ear pain, joint pain, nausea, neck pain, plugged ear sensation, rash, sinus pain, sneezing, vomiting or wheezing. She has tried sleep and increased fluids for the symptoms. The treatment provided mild relief.       Family History   Problem Relation Age of Onset    Stroke Mother     No Known Problems Father     Stroke Maternal Grandmother       Social History     Socioeconomic History    Marital status:    Tobacco Use    Smoking status: Never Smoker    Smokeless tobacco: Never Used   Substance and Sexual Activity    Alcohol use: No    Drug use: No     Current Outpatient Medications   Medication Sig Dispense Refill    enalapril (VASOTEC) 10 MG tablet TAKE 1 TABLET(10 MG) BY MOUTH EVERY DAY 90 tablet 0    hydrocortisone 2.5 % cream Apply topically 2 (two) times daily. 28 g 4    tamoxifen (NOLVADEX) 20 MG Tab TAKE 1 TABLET(20 MG) BY MOUTH EVERY DAY 30 tablet 11    AFLURIA QD 2020-21,3YR UP,,PF, 60 mcg (15 mcg x 4)/0.5 mL Syrg ADM 0.5ML IM UTD      nystatin-triamcinolone (MYCOLOG) ointment Apply topically 2 (two) times daily. for 14 days 60 g 3     No current facility-administered medications for this visit.     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 Negative for appetite change and fever.   HENT: Positive for congestion, rhinorrhea and sore throat. Negative for ear discharge, ear pain, postnasal drip, sinus pressure, sinus pain, sneezing and trouble swallowing.    Eyes: Negative for pain, discharge and visual disturbance.   Respiratory: Positive for cough (nonproductive ). Negative for shortness of breath and wheezing.    Cardiovascular: Negative for chest pain.   Gastrointestinal: Negative for abdominal pain, diarrhea, nausea and vomiting.   Genitourinary: Negative for dysuria and frequency.   Musculoskeletal: Positive for myalgias. Negative for joint pain and neck pain.   Skin: Negative for rash.   Neurological: Positive for headaches. Negative for dizziness and weakness.   Hematological: Positive for adenopathy.      OBJECTIVE:      Vitals:    22 1002   BP: 130/84   BP Location: Left arm   Patient Position: Sitting   BP Method: Large (Manual)   Pulse: 82   Resp: 20   Temp: 98.8 °F (37.1 °C)   TempSrc: Oral   SpO2: 98%   Weight: 81.6 kg (179 lb 14.4 oz)   Height: 5' 3" (1.6 m)     Physical Exam  Vitals and nursing note reviewed.   Constitutional:       General: She is not in acute distress.     Appearance: Normal appearance. She is obese. She is ill-appearing.   HENT:      Head: Normocephalic.      Right Ear: Tympanic membrane, ear canal and external ear normal.      Left Ear: Tympanic membrane, ear canal and external ear normal.      Nose: Congestion present. No rhinorrhea.      Mouth/Throat:      Comments: Facemask in place   Eyes:      General:         Right eye: No discharge.         Left eye: No discharge.      Conjunctiva/sclera: Conjunctivae normal.      Pupils: Pupils are equal, round, and reactive to light.   Cardiovascular:      Rate and Rhythm: " Normal rate and regular rhythm.   Pulmonary:      Effort: Pulmonary effort is normal. No respiratory distress.      Breath sounds: Normal breath sounds. No wheezing, rhonchi or rales.   Abdominal:      General: Bowel sounds are normal.      Palpations: Abdomen is soft.      Tenderness: There is no abdominal tenderness.   Musculoskeletal:      Cervical back: Normal range of motion and neck supple. No rigidity.   Lymphadenopathy:      Cervical: Cervical adenopathy present.   Skin:     General: Skin is warm and dry.      Coloration: Skin is not jaundiced or pale.   Neurological:      Mental Status: She is alert and oriented to person, place, and time.   Psychiatric:         Mood and Affect: Mood normal.         Behavior: Behavior normal.        Assessment:       1. Chills    2. COVID-19        Plan:       Chills  -     POCT COVID-19 Rapid Screening (pos)  -     POCT Influenza A/B Molecular (neg)    COVID-19    *Self quarantine at home, avoiding contact with other people and pets in the home. Wear a mask over your nose and mouth if you must be around others in the home. Wash hands often and have family member clean high touch surfaces often in the home. Symptom control with OTC medications, like tylenol and motrin for fever, Mucinex DM or similar for cough, antihistamine (like Zyrtec or Claritin) for nasal symptoms. Get plenty of rest and fluids to maintain hydration. Gargle with warm salt water if sore throat is present. If extreme lethargy, trouble breathing, uncontrolled fevers, or confusion, present to ER for further treatment. You can be around others after:   10 days since symptoms first appeared AND   24 hours with no fever without the use of fever-reducing medications AND   COVID-19 symptoms have improved (for example, cough, shortness of breath)   New CDC guidelines have indicated a return to work or being out of quarantine after 5 days IF ASYMPTOMATIC at that time and the advisement is to wear a mask for 5  days afterwards as well. If still symptomatic after 5 days, continue to quarantine until asymptomatic or for 10 days total, whichever comes first.        No follow-ups on file.      1/20/2022 ENOC Bolaños, DANIELAP    This note was created using [employer] voice recognition software that occasionally misinterprets phrases or words.

## 2022-01-20 NOTE — LETTER
January 20, 2022      [facility] Family / Internal Medicine  [address]  [facility] [address]  Phone: [phone]  Fax: [phone]       Patient: Catrachita Johnson   YOB: 1960  Date of Visit: 01/20/2022    To Whom It May Concern:    Stef Johnson  was at [facility] on 01/20/2022. The patient may return to work/school on 1/30/22 with no restrictions. If you have any questions or concerns, or if I can be of further assistance, please do not hesitate to contact me.    Sincerely,      VANESSA Paulino LPN

## 2022-01-25 LAB — NONINV COLON CA DNA+OCC BLD SCRN STL QL: NEGATIVE

## 2022-01-26 ENCOUNTER — TELEPHONE (OUTPATIENT)
Dept: FAMILY MEDICINE | Facility: CLINIC | Age: 62
End: 2022-01-26
Payer: COMMERCIAL

## 2022-01-26 NOTE — TELEPHONE ENCOUNTER
----- Message from Jean-Pierre Waller MD sent at 1/25/2022  8:04 AM CST -----  Results Ok, notify patient.

## 2022-01-31 NOTE — PROGRESS NOTES
[facility] Hematology/Oncology  PROGRESS NOTE      Subjective:       Patient ID:   NAME: Catrachita Johnson : 1960     61 y.o. female    Referring Doc: Sridhar  Other Physicians: Marylin Gomez Eddington    Chief Complaint:  DCIS f/u    History of Present Illness:     Patient returns today for a regularly scheduled follow-up visit. She is here by herself.  She last saw Dr Waller on 1/10/2022     She had recent bout of covid in 2022 and has since resolved    She denies any CP,SOB, HA's or N/V. She has been on tamoxifen and doing well with it.      She had mammogram on Aug 11th 2021    discussed covid19 precautions and she had her vaccinations      ROS:   GEN: normal without any fever, night sweats or weight loss  HEENT: normal with no HA's, sore throat, stiff neck, changes in vision  CV: normal with no CP, SOB, PND, JACKSON or orthopnea  PULM: normal with no SOB, cough, hemoptysis, sputum or pleuritic pain  GI: normal with no abdominal pain, nausea, vomiting, constipation, diarrhea, melanotic stools, BRBPR, or hematemesis  : normal with no hematuria, dysuria  BREAST: normal with no mass, discharge, pain  SKIN: normal with no rash, erythema, bruising, or swelling    Allergies:  Review of patient's allergies indicates:   Allergen Reactions    Bactrim [sulfamethoxazole-trimethoprim]     Cherries     Tetracyclines        Medications:    Current Outpatient Medications:     enalapril (VASOTEC) 10 MG tablet, TAKE 1 TABLET(10 MG) BY MOUTH EVERY DAY, Disp: 90 tablet, Rfl: 0    hydrocortisone 2.5 % cream, Apply topically 2 (two) times daily., Disp: 28 g, Rfl: 4    nystatin-triamcinolone (MYCOLOG) ointment, Apply topically 2 (two) times daily. for 14 days, Disp: 60 g, Rfl: 3    tamoxifen (NOLVADEX) 20 MG Tab, TAKE 1 TABLET(20 MG) BY MOUTH EVERY DAY, Disp: 30 tablet, Rfl: 11    PMHx/PSHx Updates:  See patient's last visit with me on 2/3/2021  See H&P on 2017        Pathology:  Cancer Staging  Ductal carcinoma in  situ (DCIS) of left breast  Staging form: Onc Breast AJCC V7  - Clinical: Stage 0 (Tis (DCIS), N0, M0) - Signed by Arnie Gomez Jr., MD on 9/5/2017          Objective:     Vitals:  There were no vitals taken for this visit.    Physical Examination:   GEN: no apparent distress, comfortable; AAOx3  HEAD: atraumatic and normocephalic  EYES: no pallor, no icterus, PERRLA  ENT: OMM, no pharyngeal erythema, external ears WNL; no nasal discharge; no thrush  NECK: no masses, thyroid normal, trachea midline, no LAD/LN's, supple  CV: RRR with no murmur; normal pulse; normal S1 and S2; no pedal edema  CHEST: Normal respiratory effort; CTAB; normal breath sounds; no wheeze or crackles  ABDOM: nontender and nondistended; soft; normal bowel sounds; no rebound/guarding  MUSC/Skeletal: ROM normal; no crepitus; joints normal; no deformities or arthropathy  EXTREM: no clubbing, cyanosis, inflammation ; ankle issues resolved  SKIN: no rashes, lesions, ulcers, petechiae or subcutaneous nodules  : no simons  NEURO: grossly intact; motor/sensory WNL; AAOx3; no tremors  PSYCH: normal mood, affect and behavior  LYMPH: normal cervical, supraclavicular, axillary and groin LN's  Breast: left breast with no changes, no LAD, masses etc          Labs:          Lab Results   Component Value Date    WBC 5.1 08/04/2021    HGB 12.2 08/04/2021    HCT 38.3 08/04/2021    MCV 87.8 08/04/2021     08/04/2021     CMP  Sodium   Date Value Ref Range Status   08/04/2021 141 135 - 146 mmol/L Final   09/08/2017 141 134 - 144 mmol/L      Potassium   Date Value Ref Range Status   08/04/2021 4.5 3.5 - 5.3 mmol/L Final     Chloride   Date Value Ref Range Status   08/04/2021 106 98 - 110 mmol/L Final   09/08/2017 105 98 - 110 mmol/L      CO2   Date Value Ref Range Status   08/04/2021 29 20 - 32 mmol/L Final     Glucose   Date Value Ref Range Status   08/04/2021 93 65 - 99 mg/dL Final     Comment:                   Fasting reference interval         09/08/2017 83 70 - 99 mg/dL      BUN   Date Value Ref Range Status   08/04/2021 13 7 - 25 mg/dL Final     Creatinine   Date Value Ref Range Status   08/04/2021 0.84 0.50 - 0.99 mg/dL Final     Comment:     For patients >49 years of age, the reference limit  for Creatinine is approximately 13% higher for people  identified as -American.        09/08/2017 0.79 0.60 - 1.40 mg/dL      Calcium   Date Value Ref Range Status   08/04/2021 9.1 8.6 - 10.4 mg/dL Final     Total Protein   Date Value Ref Range Status   08/04/2021 6.8 6.1 - 8.1 g/dL Final     Albumin   Date Value Ref Range Status   08/04/2021 4.0 3.6 - 5.1 g/dL Final   09/08/2017 4.1 3.1 - 4.7 g/dL      Total Bilirubin   Date Value Ref Range Status   08/04/2021 0.3 0.2 - 1.2 mg/dL Final     Alkaline Phosphatase   Date Value Ref Range Status   07/30/2020 62 37 - 153 U/L Final     AST   Date Value Ref Range Status   08/04/2021 15 10 - 35 U/L Final     ALT   Date Value Ref Range Status   08/04/2021 12 6 - 29 U/L Final     eGFR if    Date Value Ref Range Status   08/04/2021 87 > OR = 60 mL/min/1.73m2 Final     eGFR if non    Date Value Ref Range Status   08/04/2021 75 > OR = 60 mL/min/1.73m2 Final               Radiology/Diagnostic Studies:    Mammo 8/11/2021 on chart from [facility] - negative    Mammo 8/10/2020 on chart    I have reviewed all available lab results and radiology reports.    Assessment/Plan:   (1) 61 y.o. female with diagnosis of DCIS who has been referred by Dr Coulter for oncology evaluation  - followed by Dr Waldo Padilla with GYN  - positive family history of breast cancer (2nd degree relatives)  - DCIS with no invasive component  - ER+/CO+  - previously discussed the prognostics of DCIS especially the up to 25% risk of breast cancer development in patients who do not have resective surgery  - Dr Coulter with s/p lumpectomy on 9/13/2017; required radiation afterwards to provide equivocal survival benefit when  compared to patient who have complete mastectomies  - I discussed the recommendation for postoperative radiation and she completed XRT on Nov 6th 2017  -  Previously discussed antihormone therapy with tamoxifen, and also discussed the side-effect profile and the need to f/u with GYN yearly  - mammo 7/22/2019 was negative with benign findings    2/3/2021:  - mammo on 8/20/2020 was negative  - due for next one on or after 8/21/2020  - mild borderline anemia  - check iron panel and B12/folate  - continued on tamoxifen    8/3/2021:   - mammo due next week Aug 2021 and already scheduled  - continued tamoxifen  - up to date labs pending    2/1/2022:  - latest mammo on 8/11/2021 negative - next one due in Aug 2022  - needs up to date labs  - doing well on tamoxifen     (2) Irritable bowel syndrome     (3) HTN - followed by PCP    (4) recovered from bout of covid Jan 2022      1. Ductal carcinoma in situ (DCIS) of left breast     2. Use of tamoxifen (Nolvadex)           PLAN:  1. Continue tamoxifen; check up to date labs every 6 months (encouraged compliance)  2. F/u with PCP, GenSurg, Rad/onc, GYN   3. Mammo is scheduled for Aug 12th 2022    4. Check iron panel, B12/folate  5. RTC in 6 months  Fax note to  Jean-Pierre Waller MD, Patricia Coulter R. Eddington    Discussion:       COVID-19 Discussion:    I had long discussion with patient and any applicable family about the COVID-19 coronavirus epidemic and the recommended precautions with regard to cancer and/or hematology patients. I have re-iterated the CDC recommendations for adequate hand washing, use of hand -like products, and coughing into elbow, etc. In addition, especially for our patients who are on chemotherapy and/or our otherwise immunocompromised patients, I have recommended avoidance of crowds, including movie theaters, restaurants, churches, etc. I have recommended avoidance of any sick or symptomatic family members and/or friends. I have also  recommended avoidance of any raw and unwashed food products, and general avoidance of food items that have not been prepared by themselves. The patient has been asked to call us immediately with any symptom developments, issues, questions or other general concerns.     I have explained all of the above in detail and the patient understands all of the current recommendation(s). I have answered all of their questions to the best of my ability and to their complete satisfaction.   The patient is to continue with the current management plan.            Electronically signed by Festus Walter MD

## 2022-02-01 ENCOUNTER — OFFICE VISIT (OUTPATIENT)
Dept: HEMATOLOGY/ONCOLOGY | Facility: CLINIC | Age: 62
End: 2022-02-01
Payer: COMMERCIAL

## 2022-02-01 VITALS
HEART RATE: 72 BPM | WEIGHT: 178 LBS | BODY MASS INDEX: 31.54 KG/M2 | SYSTOLIC BLOOD PRESSURE: 150 MMHG | TEMPERATURE: 97 F | RESPIRATION RATE: 18 BRPM | DIASTOLIC BLOOD PRESSURE: 90 MMHG | HEIGHT: 63 IN

## 2022-02-01 DIAGNOSIS — Z79.810 USE OF TAMOXIFEN (NOLVADEX): ICD-10-CM

## 2022-02-01 DIAGNOSIS — D05.12 DUCTAL CARCINOMA IN SITU (DCIS) OF LEFT BREAST: Primary | ICD-10-CM

## 2022-02-01 PROCEDURE — 3008F BODY MASS INDEX DOCD: CPT | Mod: S$GLB,,, | Performed by: INTERNAL MEDICINE

## 2022-02-01 PROCEDURE — 3080F PR MOST RECENT DIASTOLIC BLOOD PRESSURE >= 90 MM HG: ICD-10-PCS | Mod: S$GLB,,, | Performed by: INTERNAL MEDICINE

## 2022-02-01 PROCEDURE — 3008F PR BODY MASS INDEX (BMI) DOCUMENTED: ICD-10-PCS | Mod: S$GLB,,, | Performed by: INTERNAL MEDICINE

## 2022-02-01 PROCEDURE — 3077F PR MOST RECENT SYSTOLIC BLOOD PRESSURE >= 140 MM HG: ICD-10-PCS | Mod: S$GLB,,, | Performed by: INTERNAL MEDICINE

## 2022-02-01 PROCEDURE — 3077F SYST BP >= 140 MM HG: CPT | Mod: S$GLB,,, | Performed by: INTERNAL MEDICINE

## 2022-02-01 PROCEDURE — 1160F PR REVIEW ALL MEDS BY PRESCRIBER/CLIN PHARMACIST DOCUMENTED: ICD-10-PCS | Mod: S$GLB,,, | Performed by: INTERNAL MEDICINE

## 2022-02-01 PROCEDURE — 3080F DIAST BP >= 90 MM HG: CPT | Mod: S$GLB,,, | Performed by: INTERNAL MEDICINE

## 2022-02-01 PROCEDURE — 99214 OFFICE O/P EST MOD 30 MIN: CPT | Mod: S$GLB,,, | Performed by: INTERNAL MEDICINE

## 2022-02-01 PROCEDURE — 99214 PR OFFICE/OUTPT VISIT, EST, LEVL IV, 30-39 MIN: ICD-10-PCS | Mod: S$GLB,,, | Performed by: INTERNAL MEDICINE

## 2022-02-01 PROCEDURE — 1160F RVW MEDS BY RX/DR IN RCRD: CPT | Mod: S$GLB,,, | Performed by: INTERNAL MEDICINE

## 2022-02-23 LAB
ALBUMIN SERPL-MCNC: 4 G/DL (ref 3.6–5.1)
ALBUMIN/GLOB SERPL: 1.4 (CALC) (ref 1–2.5)
ALP SERPL-CCNC: 65 U/L (ref 37–153)
ALT SERPL-CCNC: 13 U/L (ref 6–29)
AST SERPL-CCNC: 16 U/L (ref 10–35)
BASOPHILS # BLD AUTO: 31 CELLS/UL (ref 0–200)
BASOPHILS NFR BLD AUTO: 0.6 %
BILIRUB SERPL-MCNC: 0.3 MG/DL (ref 0.2–1.2)
BUN SERPL-MCNC: 12 MG/DL (ref 7–25)
BUN/CREAT SERPL: NORMAL (CALC) (ref 6–22)
CALCIUM SERPL-MCNC: 9.2 MG/DL (ref 8.6–10.4)
CHLORIDE SERPL-SCNC: 107 MMOL/L (ref 98–110)
CO2 SERPL-SCNC: 27 MMOL/L (ref 20–32)
CREAT SERPL-MCNC: 0.74 MG/DL (ref 0.5–0.99)
EOSINOPHIL # BLD AUTO: 88 CELLS/UL (ref 15–500)
EOSINOPHIL NFR BLD AUTO: 1.7 %
ERYTHROCYTE [DISTWIDTH] IN BLOOD BY AUTOMATED COUNT: 13.3 % (ref 11–15)
GLOBULIN SER CALC-MCNC: 2.9 G/DL (CALC) (ref 1.9–3.7)
GLUCOSE SERPL-MCNC: 88 MG/DL (ref 65–99)
HCT VFR BLD AUTO: 37.2 % (ref 35–45)
HGB BLD-MCNC: 11.9 G/DL (ref 11.7–15.5)
LYMPHOCYTES # BLD AUTO: 1581 CELLS/UL (ref 850–3900)
LYMPHOCYTES NFR BLD AUTO: 30.4 %
MCH RBC QN AUTO: 27.9 PG (ref 27–33)
MCHC RBC AUTO-ENTMCNC: 32 G/DL (ref 32–36)
MCV RBC AUTO: 87.3 FL (ref 80–100)
MONOCYTES # BLD AUTO: 312 CELLS/UL (ref 200–950)
MONOCYTES NFR BLD AUTO: 6 %
NEUTROPHILS # BLD AUTO: 3188 CELLS/UL (ref 1500–7800)
NEUTROPHILS NFR BLD AUTO: 61.3 %
PLATELET # BLD AUTO: 263 THOUSAND/UL (ref 140–400)
PMV BLD REES-ECKER: 9.6 FL (ref 7.5–12.5)
POTASSIUM SERPL-SCNC: 4.3 MMOL/L (ref 3.5–5.3)
PROT SERPL-MCNC: 6.9 G/DL (ref 6.1–8.1)
RBC # BLD AUTO: 4.26 MILLION/UL (ref 3.8–5.1)
SODIUM SERPL-SCNC: 142 MMOL/L (ref 135–146)
WBC # BLD AUTO: 5.2 THOUSAND/UL (ref 3.8–10.8)

## 2022-04-18 ENCOUNTER — OFFICE VISIT (OUTPATIENT)
Dept: FAMILY MEDICINE | Facility: CLINIC | Age: 62
End: 2022-04-18
Payer: COMMERCIAL

## 2022-04-18 VITALS
TEMPERATURE: 99 F | RESPIRATION RATE: 16 BRPM | OXYGEN SATURATION: 98 % | SYSTOLIC BLOOD PRESSURE: 132 MMHG | BODY MASS INDEX: 31.36 KG/M2 | DIASTOLIC BLOOD PRESSURE: 80 MMHG | HEART RATE: 78 BPM | HEIGHT: 63 IN | WEIGHT: 177 LBS

## 2022-04-18 DIAGNOSIS — B35.4 RINGWORM OF BODY: ICD-10-CM

## 2022-04-18 DIAGNOSIS — B35.4 TINEA CORPORIS: Primary | ICD-10-CM

## 2022-04-18 PROCEDURE — 3008F BODY MASS INDEX DOCD: CPT | Mod: S$GLB,,, | Performed by: INTERNAL MEDICINE

## 2022-04-18 PROCEDURE — 4010F ACE/ARB THERAPY RXD/TAKEN: CPT | Mod: S$GLB,,, | Performed by: INTERNAL MEDICINE

## 2022-04-18 PROCEDURE — 4010F PR ACE/ARB THEARPY RXD/TAKEN: ICD-10-PCS | Mod: S$GLB,,, | Performed by: INTERNAL MEDICINE

## 2022-04-18 PROCEDURE — 1160F PR REVIEW ALL MEDS BY PRESCRIBER/CLIN PHARMACIST DOCUMENTED: ICD-10-PCS | Mod: S$GLB,,, | Performed by: INTERNAL MEDICINE

## 2022-04-18 PROCEDURE — 99213 OFFICE O/P EST LOW 20 MIN: CPT | Mod: S$GLB,,, | Performed by: INTERNAL MEDICINE

## 2022-04-18 PROCEDURE — 1160F RVW MEDS BY RX/DR IN RCRD: CPT | Mod: S$GLB,,, | Performed by: INTERNAL MEDICINE

## 2022-04-18 PROCEDURE — 99213 PR OFFICE/OUTPT VISIT, EST, LEVL III, 20-29 MIN: ICD-10-PCS | Mod: S$GLB,,, | Performed by: INTERNAL MEDICINE

## 2022-04-18 PROCEDURE — 3008F PR BODY MASS INDEX (BMI) DOCUMENTED: ICD-10-PCS | Mod: S$GLB,,, | Performed by: INTERNAL MEDICINE

## 2022-04-18 RX ORDER — FLUCONAZOLE 150 MG/1
150 TABLET ORAL DAILY
Qty: 7 TABLET | Refills: 0 | Status: SHIPPED | OUTPATIENT
Start: 2022-04-18 | End: 2022-04-25

## 2022-04-18 RX ORDER — MINERAL OIL
180 ENEMA (ML) RECTAL DAILY
Qty: 10 TABLET | Refills: 0 | Status: SHIPPED | OUTPATIENT
Start: 2022-04-18 | End: 2023-07-31

## 2022-04-18 RX ORDER — NYSTATIN 100000 U/G
CREAM TOPICAL 2 TIMES DAILY
Qty: 15 G | Refills: 1 | Status: SHIPPED | OUTPATIENT
Start: 2022-04-18

## 2022-04-18 NOTE — PROGRESS NOTES
SUBJECTIVE:    Patient ID: Catrachita Johnson is a 61 y.o. female.    Chief Complaint: Rash (On both legs. Perfect circles)    HPI     Patien t developed a round red lesion in November and it has not responded to any oils or applications-the lesions itch-she had some nystatin/triamcinolone but the refill was going to cost her 240$    Office Visit on 02/01/2022   Component Date Value Ref Range Status    WBC 02/22/2022 5.2  3.8 - 10.8 Thousand/uL Final    RBC 02/22/2022 4.26  3.80 - 5.10 Million/uL Final    Hemoglobin 02/22/2022 11.9  11.7 - 15.5 g/dL Final    Hematocrit 02/22/2022 37.2  35.0 - 45.0 % Final    MCV 02/22/2022 87.3  80.0 - 100.0 fL Final    MCH 02/22/2022 27.9  27.0 - 33.0 pg Final    MCHC 02/22/2022 32.0  32.0 - 36.0 g/dL Final    RDW 02/22/2022 13.3  11.0 - 15.0 % Final    Platelets 02/22/2022 263  140 - 400 Thousand/uL Final    MPV 02/22/2022 9.6  7.5 - 12.5 fL Final    Neutrophils, Abs 02/22/2022 3,188  1,500 - 7,800 cells/uL Final    Lymph # 02/22/2022 1,581  850 - 3,900 cells/uL Final    Mono # 02/22/2022 312  200 - 950 cells/uL Final    Eos # 02/22/2022 88  15 - 500 cells/uL Final    Baso # 02/22/2022 31  0 - 200 cells/uL Final    Neutrophils Relative 02/22/2022 61.3  % Final    Lymph % 02/22/2022 30.4  % Final    Mono % 02/22/2022 6.0  % Final    Eosinophil % 02/22/2022 1.7  % Final    Basophil % 02/22/2022 0.6  % Final    Glucose 02/22/2022 88  65 - 99 mg/dL Final    BUN 02/22/2022 12  7 - 25 mg/dL Final    Creatinine 02/22/2022 0.74  0.50 - 0.99 mg/dL Final    eGFR if non African American 02/22/2022 87  > OR = 60 mL/min/1.73m2 Final    eGFR if  02/22/2022 101  > OR = 60 mL/min/1.73m2 Final    BUN/Creatinine Ratio 02/22/2022 NOT APPLICABLE  6 - 22 (calc) Final    Sodium 02/22/2022 142  135 - 146 mmol/L Final    Potassium 02/22/2022 4.3  3.5 - 5.3 mmol/L Final    Chloride 02/22/2022 107  98 - 110 mmol/L Final    CO2 02/22/2022 27  20 - 32 mmol/L  Final    Calcium 02/22/2022 9.2  8.6 - 10.4 mg/dL Final    Total Protein 02/22/2022 6.9  6.1 - 8.1 g/dL Final    Albumin 02/22/2022 4.0  3.6 - 5.1 g/dL Final    Globulin, Total 02/22/2022 2.9  1.9 - 3.7 g/dL (calc) Final    Albumin/Globulin Ratio 02/22/2022 1.4  1.0 - 2.5 (calc) Final    Total Bilirubin 02/22/2022 0.3  0.2 - 1.2 mg/dL Final    Alkaline Phosphatase 02/22/2022 65  37 - 153 U/L Final    AST 02/22/2022 16  10 - 35 U/L Final    ALT 02/22/2022 13  6 - 29 U/L Final   Office Visit on 01/20/2022   Component Date Value Ref Range Status    POC Rapid COVID 01/20/2022 Positive (A) Negative Final     Acceptable 01/20/2022 Yes   Final    POC Molecular Influenza A Ag 01/20/2022 Negative  Negative, Not Reported Final    POC Molecular Influenza B Ag 01/20/2022 Negative  Negative, Not Reported Final     Acceptable 01/20/2022 Yes   Final   Office Visit on 01/10/2022   Component Date Value Ref Range Status    Rapid Strep A Screen 01/10/2022 Negative  Negative Final     Acceptable 01/10/2022 Yes   Final    POC Rapid COVID 01/10/2022 Negative  Negative Final     Acceptable 01/10/2022 Yes   Final    Cologuard Result 01/18/2022 Negative  Negative Final   Office Visit on 08/03/2021   Component Date Value Ref Range Status    WBC 08/04/2021 5.1  3.8 - 10.8 Thousand/uL Final    RBC 08/04/2021 4.36  3.80 - 5.10 Million/uL Final    Hemoglobin 08/04/2021 12.2  11.7 - 15.5 g/dL Final    Hematocrit 08/04/2021 38.3  35.0 - 45.0 % Final    MCV 08/04/2021 87.8  80.0 - 100.0 fL Final    MCH 08/04/2021 28.0  27.0 - 33.0 pg Final    MCHC 08/04/2021 31.9 (A) 32.0 - 36.0 g/dL Final    RDW 08/04/2021 13.3  11.0 - 15.0 % Final    Platelets 08/04/2021 255  140 - 400 Thousand/uL Final    MPV 08/04/2021 9.7  7.5 - 12.5 fL Final    Neutrophils, Abs 08/04/2021 2,978  1,500 - 7,800 cells/uL Final    Lymph # 08/04/2021 1,668  850 - 3,900 cells/uL Final     Mono # 2021 352  200 - 950 cells/uL Final    Eos # 2021 82  15 - 500 cells/uL Final    Baso # 2021 20  0 - 200 cells/uL Final    Neutrophils Relative 2021 58.4  % Final    Lymph % 2021 32.7  % Final    Mono % 2021 6.9  % Final    Eosinophil % 2021 1.6  % Final    Basophil % 2021 0.4  % Final    Glucose 2021 93  65 - 99 mg/dL Final    BUN 2021 13  7 - 25 mg/dL Final    Creatinine 2021 0.84  0.50 - 0.99 mg/dL Final    eGFR if non African American 2021 75  > OR = 60 mL/min/1.73m2 Final    eGFR if  2021 87  > OR = 60 mL/min/1.73m2 Final    BUN/Creatinine Ratio 2021 NOT APPLICABLE  6 -  (calc) Final    Sodium 2021 141  135 - 146 mmol/L Final    Potassium 2021 4.5  3.5 - 5.3 mmol/L Final    Chloride 2021 106  98 - 110 mmol/L Final    CO2 2021 29  20 - 32 mmol/L Final    Calcium 2021 9.1  8.6 - 10.4 mg/dL Final    Total Protein 2021 6.8  6.1 - 8.1 g/dL Final    Albumin 2021 4.0  3.6 - 5.1 g/dL Final    Globulin, Total 2021 2.8  1.9 - 3.7 g/dL (calc) Final    Albumin/Globulin Ratio 2021 1.4  1.0 - 2.5 (calc) Final    Total Bilirubin 2021 0.3  0.2 - 1.2 mg/dL Final    Alkaline Phosphatase 2021 59  37 - 153 U/L Final    AST 2021 15  10 - 35 U/L Final    ALT 2021 12  6 - 29 U/L Final    Iron 2021 104  45 - 160 mcg/dL Final    TIBC 2021 351  250 - 450 mcg/dL (calc) Final    Iron Saturation 2021 30  16 - 45 % (calc) Final    Ferritin 2021 81  16 - 288 ng/mL Final       Past Medical History:   Diagnosis Date    Cancer     breast    Ductal carcinoma in situ (DCIS) of left breast 2017    Hypertension     Mass of left axilla     Use of tamoxifen (Nolvadex) 2017     Past Surgical History:   Procedure Laterality Date    BREAST SURGERY Left 2017    Lumpectomy     SECTION    "   MYOMECTOMY       Family History   Problem Relation Age of Onset    Stroke Mother     No Known Problems Father     Stroke Maternal Grandmother        Marital Status:   Alcohol History:  reports no history of alcohol use.  Tobacco History:  reports that she has never smoked. She has never used smokeless tobacco.  Drug History:  reports no history of drug use.    Review of patient's allergies indicates:   Allergen Reactions    Cherries     Tetracyclines        Current Outpatient Medications:     enalapril (VASOTEC) 10 MG tablet, TAKE 1 TABLET(10 MG) BY MOUTH EVERY DAY, Disp: 90 tablet, Rfl: 0    hydrocortisone 2.5 % cream, Apply topically 2 (two) times daily., Disp: 28 g, Rfl: 4    nystatin-triamcinolone (MYCOLOG) ointment, APPLY EXTERNALLY TO THE AFFECTED AREA TWICE DAILY FOR 14 DAYS, Disp: 60 g, Rfl: 3    tamoxifen (NOLVADEX) 20 MG Tab, TAKE 1 TABLET(20 MG) BY MOUTH EVERY DAY, Disp: 30 tablet, Rfl: 11    Review of Systems   All other systems reviewed and are negative.         Objective:      Vitals:    04/18/22 1408   BP: 132/80   Pulse: 78   Resp: 16   Temp: 98.9 °F (37.2 °C)   SpO2: 98%   Weight: 80.3 kg (177 lb)   Height: 5' 3" (1.6 m)     Physical Exam  Constitutional:       Appearance: Normal appearance. She is obese.   HENT:      Head: Normocephalic and atraumatic.   Eyes:      Extraocular Movements: Extraocular movements intact.      Pupils: Pupils are equal, round, and reactive to light.   Musculoskeletal:      Right lower leg: No edema.      Left lower leg: No edema.   Skin:     Findings: Rash present.      Comments: Typical areas of tinea corporis and round lesion of ringworm on the lower right leg and several less clear lesions on the left lower leg   Neurological:      Mental Status: She is alert.   Psychiatric:         Mood and Affect: Mood normal.         Thought Content: Thought content normal.         Judgment: Judgment normal.           Assessment:       No diagnosis found.   "   Plan:       There are no diagnoses linked to this encounter.  No follow-ups on file.        4/18/2022 Shellie Garay M.D.

## 2022-06-10 ENCOUNTER — TELEPHONE (OUTPATIENT)
Dept: ALLERGY | Facility: CLINIC | Age: 62
End: 2022-06-10
Payer: COMMERCIAL

## 2022-06-10 ENCOUNTER — TELEPHONE (OUTPATIENT)
Dept: DERMATOLOGY | Facility: CLINIC | Age: 62
End: 2022-06-10
Payer: COMMERCIAL

## 2022-06-10 ENCOUNTER — TELEPHONE (OUTPATIENT)
Dept: INFECTIOUS DISEASES | Facility: CLINIC | Age: 62
End: 2022-06-10
Payer: COMMERCIAL

## 2022-06-10 NOTE — TELEPHONE ENCOUNTER
Pt has a rash on body since Oct 2021 and has been treated w/ creams. Rash gets discolored and not getting better. Reviewed schedule w/ pt to see if there was a sooner opening in office. Advised that she currently soonest appointment on 8/8/22 w/ Steffany Hawk MD, but will place pt on wait list incase of a sooner appointment in office.

## 2022-06-10 NOTE — TELEPHONE ENCOUNTER
----- Message from [facility] Araya sent at 6/10/2022 12:03 PM CDT -----     Type: Appointment Request     Caller is requesting appointment np     Was A Appointment Solution Found When Scheduling? None     Best Call Back Number: [phone]     Additional Information: PT WILL EXPLAIN IN DETAIL THE REASON FOR THE VISITS.

## 2022-06-10 NOTE — TELEPHONE ENCOUNTER
"Spoke with  who stated she is looking for someone to treat her "rashes", and there are no dermatologists with any current availability. I advised that Dr Willard is an Infectious Diseases Specialist and doesn't treat general rashes. I advised that she see someone and have the area biopsied and if the results are infectious, she can call us back or have the physician fax over a referral and the results and I would then have Dr Willard look over the results to see if it is something that He treats, and if so, we would then call and get her scheduled. Understanding verbalized.  "

## 2022-06-10 NOTE — TELEPHONE ENCOUNTER
----- Message from [facility] Araya sent at 6/10/2022 12:03 PM CDT -----     Type: Appointment Request     Caller is requesting appointment np     Was A Appointment Solution Found When Scheduling? None     Best Call Back Number: [phone]     Additional Information: PT WILL EXPLAIN IN DETAIL THE REASON FOR THE VISITS.

## 2022-06-10 NOTE — TELEPHONE ENCOUNTER
----- Message from [facility] Araya sent at 6/10/2022 10:41 AM CDT -----  .Type: Appointment Request     Caller is requesting appointment np     Was A Appointment Solution Found When Scheduling? None     Best Call Back Number: [phone]    Additional Information: PT WILL EXPLAIN IN DETAIL THE REASON FOR THE VISITS.

## 2022-06-10 NOTE — TELEPHONE ENCOUNTER
Returned call to pt.  States she has rashes to arms and legs, which she has seen her PCP for and diagnosed with dermatitis.  Instructed pt to keep her appt with dermatology on 8/8/22 and possibly reach out to her PCP to see if they can get her in with dermatology sooner.    Principal Discharge DX:	Cervical radiculopathy  Goal:	Return to prior functional status  Instructions for follow-up, activity and diet:	Please keep incision clean and dry, do not submerge wound in water for prolonged periods of time, pat dry after showering, and do not use any creams or ointments to incision.  please do not engage in strenuous activity, heavy lifting, drive, or return to work or school until cleared by surgeon.   Return to ER immediately for any of the following: fever, bleeding, new onset numbness/tingling/weakness, nausea and/or vomiting or difficulty swallowing.  Please make a follow up appointment to see Dr. Mcgovern within one week of discharge. Principal Discharge DX:	Cervical radiculopathy  Goal:	Return to prior functional status  Instructions for follow-up, activity and diet:	Please keep incision clean and dry, do not submerge wound in water for prolonged periods of time, pat dry after showering, and do not use any creams or ointments to incision.  Please do not engage in strenuous activity, heavy lifting, drive, or return to work or school until cleared by surgeon.   Return to ER immediately for any of the following: fever, bleeding, new onset numbness/tingling/weakness, nausea and/or vomiting or difficulty swallowing or coughing while eating.  Please make a follow up appointment to see Dr. Mcgovern within one week of discharge.  Secondary Diagnosis:	Dysphagia  Instructions for follow-up, activity and diet:	Patient developed dysphagia post op, which is a normal complication of the surgery she had secondary to swelling.  She was evaluated by the speech department and was cleared for discharge home with a soft diet with thickened liquids.  Any further swallowing difficulties please call Dr. Mcgovern immediately. Principal Discharge DX:	S/P spinal surgery  Goal:	Return to prior functional status  Instructions for follow-up, activity and diet:	Please keep incision clean and dry, do not submerge wound in water for prolonged periods of time, pat dry after showering, and do not use any creams or ointments to incision.  Please do not engage in strenuous activity, heavy lifting, drive, or return to work or school until cleared by surgeon.   Return to ER immediately for any of the following: fever, bleeding, new onset numbness/tingling/weakness, nausea and/or vomiting or difficulty swallowing or coughing while eating.  Please make a follow up appointment to see Dr. Mcgovern within one week of discharge.  Secondary Diagnosis:	Dysphagia  Instructions for follow-up, activity and diet:	Patient developed dysphagia post op, which is a normal complication of the surgery she had secondary to swelling.  She was evaluated by the speech department and was cleared for discharge home with a soft diet with thickened liquids.  Any further swallowing difficulties please call Dr. Mcgovern immediately.  Secondary Diagnosis:	Cervical radiculopathy  Instructions for follow-up, activity and diet:	You were seen by Dr. Walker while you were admitted for your radiculopathy.  You were started on Lyrica with the plan to taper the Gabapentin.  Please continue both and follow up with Dr. Walker to further taper the Gabapentin. Principal Discharge DX:	S/P spinal surgery  Goal:	Return to prior functional status  Instructions for follow-up, activity and diet:	Please keep incision clean and dry, do not submerge wound in water for prolonged periods of time, pat dry after showering, and do not use any creams or ointments to incision.  Please do not engage in strenuous activity, heavy lifting, drive, or return to work or school until cleared by surgeon.   Return to ER immediately for any of the following: fever, bleeding, new onset numbness/tingling/weakness, nausea and/or vomiting or difficulty swallowing or coughing while eating.  Please make a follow up appointment to see Dr. Mcgovern within one week of discharge.  Secondary Diagnosis:	Dysphagia  Instructions for follow-up, activity and diet:	Patient developed dysphagia post op, which is a normal complication of the surgery she had secondary to swelling.  She was evaluated by the speech department and was cleared for discharge home with a soft diet with thickened liquids.  Please follow up with Dr. Ye from ENT to evaluate your swallowing and possibly help to facilitate and swallowing therapy you may need as an outpatient.  Secondary Diagnosis:	Cervical radiculopathy  Instructions for follow-up, activity and diet:	You were seen by Dr. Walker while you were admitted for your radiculopathy.  You were started on Lyrica with the plan to taper the Gabapentin.  Please continue both and follow up with Dr. Walker to further taper the Gabapentin.

## 2022-06-13 ENCOUNTER — OFFICE VISIT (OUTPATIENT)
Dept: FAMILY MEDICINE | Facility: CLINIC | Age: 62
End: 2022-06-13
Payer: COMMERCIAL

## 2022-06-13 VITALS
OXYGEN SATURATION: 99 % | HEART RATE: 77 BPM | SYSTOLIC BLOOD PRESSURE: 110 MMHG | RESPIRATION RATE: 20 BRPM | BODY MASS INDEX: 31.43 KG/M2 | TEMPERATURE: 99 F | DIASTOLIC BLOOD PRESSURE: 70 MMHG | WEIGHT: 177.38 LBS | HEIGHT: 63 IN

## 2022-06-13 DIAGNOSIS — I10 HYPERTENSION, UNSPECIFIED TYPE: ICD-10-CM

## 2022-06-13 DIAGNOSIS — L30.4 INTERTRIGO: ICD-10-CM

## 2022-06-13 DIAGNOSIS — L50.9 URTICARIA: Primary | ICD-10-CM

## 2022-06-13 DIAGNOSIS — D05.12 DUCTAL CARCINOMA IN SITU (DCIS) OF LEFT BREAST: ICD-10-CM

## 2022-06-13 PROCEDURE — 1159F MED LIST DOCD IN RCRD: CPT | Mod: CPTII,S$GLB,, | Performed by: FAMILY MEDICINE

## 2022-06-13 PROCEDURE — 3074F PR MOST RECENT SYSTOLIC BLOOD PRESSURE < 130 MM HG: ICD-10-PCS | Mod: CPTII,S$GLB,, | Performed by: FAMILY MEDICINE

## 2022-06-13 PROCEDURE — 3078F PR MOST RECENT DIASTOLIC BLOOD PRESSURE < 80 MM HG: ICD-10-PCS | Mod: CPTII,S$GLB,, | Performed by: FAMILY MEDICINE

## 2022-06-13 PROCEDURE — 4010F ACE/ARB THERAPY RXD/TAKEN: CPT | Mod: CPTII,S$GLB,, | Performed by: FAMILY MEDICINE

## 2022-06-13 PROCEDURE — 99214 PR OFFICE/OUTPT VISIT, EST, LEVL IV, 30-39 MIN: ICD-10-PCS | Mod: S$GLB,,, | Performed by: FAMILY MEDICINE

## 2022-06-13 PROCEDURE — 3078F DIAST BP <80 MM HG: CPT | Mod: CPTII,S$GLB,, | Performed by: FAMILY MEDICINE

## 2022-06-13 PROCEDURE — 1159F PR MEDICATION LIST DOCUMENTED IN MEDICAL RECORD: ICD-10-PCS | Mod: CPTII,S$GLB,, | Performed by: FAMILY MEDICINE

## 2022-06-13 PROCEDURE — 3008F PR BODY MASS INDEX (BMI) DOCUMENTED: ICD-10-PCS | Mod: CPTII,S$GLB,, | Performed by: FAMILY MEDICINE

## 2022-06-13 PROCEDURE — 99214 OFFICE O/P EST MOD 30 MIN: CPT | Mod: S$GLB,,, | Performed by: FAMILY MEDICINE

## 2022-06-13 PROCEDURE — 3074F SYST BP LT 130 MM HG: CPT | Mod: CPTII,S$GLB,, | Performed by: FAMILY MEDICINE

## 2022-06-13 PROCEDURE — 4010F PR ACE/ARB THEARPY RXD/TAKEN: ICD-10-PCS | Mod: CPTII,S$GLB,, | Performed by: FAMILY MEDICINE

## 2022-06-13 PROCEDURE — 3008F BODY MASS INDEX DOCD: CPT | Mod: CPTII,S$GLB,, | Performed by: FAMILY MEDICINE

## 2022-06-13 RX ORDER — NYSTATIN AND TRIAMCINOLONE ACETONIDE 100000; 1 [USP'U]/G; MG/G
OINTMENT TOPICAL 2 TIMES DAILY
Qty: 30 G | Refills: 2 | Status: SHIPPED | OUTPATIENT
Start: 2022-06-13 | End: 2024-03-15

## 2022-06-13 RX ORDER — METHYLPREDNISOLONE 4 MG/1
TABLET ORAL
Qty: 21 TABLET | Refills: 0 | Status: SHIPPED | OUTPATIENT
Start: 2022-06-13 | End: 2022-09-13

## 2022-06-13 RX ORDER — ENALAPRIL MALEATE 10 MG/1
10 TABLET ORAL DAILY
Qty: 90 TABLET | Refills: 0 | Status: SHIPPED | OUTPATIENT
Start: 2022-06-13 | End: 2022-10-06 | Stop reason: SDUPTHER

## 2022-06-13 RX ORDER — HYDROXYZINE HYDROCHLORIDE 10 MG/1
25 TABLET, FILM COATED ORAL NIGHTLY PRN
Qty: 30 TABLET | Refills: 1 | Status: SHIPPED | OUTPATIENT
Start: 2022-06-13 | End: 2022-07-13

## 2022-06-13 RX ORDER — TAMOXIFEN CITRATE 20 MG/1
20 TABLET ORAL DAILY
Qty: 90 TABLET | Refills: 3 | Status: SHIPPED | OUTPATIENT
Start: 2022-06-13 | End: 2023-06-22 | Stop reason: SDUPTHER

## 2022-06-13 RX ORDER — MOMETASONE FUROATE 1 MG/G
CREAM TOPICAL DAILY
Qty: 45 G | Refills: 3 | Status: SHIPPED | OUTPATIENT
Start: 2022-06-13 | End: 2024-03-15

## 2022-06-13 NOTE — PROGRESS NOTES
Subjective:       Patient ID: Catrachita Johnson is a 61 y.o. female.    Chief Complaint: Rash and Itching      Patient is here because of recurrent rashes on both lower extremities started in October and has a spot it is been there in been persistent.  If she has new spots on the other legs and the upper arm.    Rash    Itching  Associated symptoms include a rash.       Allergies and Medications:   Review of patient's allergies indicates:   Allergen Reactions    Cherries     Tetracyclines      Current Outpatient Medications   Medication Sig Dispense Refill    fexofenadine (ALLEGRA) 180 MG tablet Take 1 tablet (180 mg total) by mouth once daily. 10 tablet 0    hydrocortisone 2.5 % cream Apply topically 2 (two) times daily. 28 g 4    nystatin (MYCOSTATIN) cream Apply topically 2 (two) times daily. 15 g 1    enalapril (VASOTEC) 10 MG tablet Take 1 tablet (10 mg total) by mouth once daily. 90 tablet 0    hydrOXYzine HCL (ATARAX) 10 MG Tab Take 3 tablets (30 mg total) by mouth nightly as needed. 30 tablet 1    methylPREDNISolone (MEDROL DOSEPACK) 4 mg tablet use as directed on package 21 tablet 0    mometasone 0.1% (ELOCON) 0.1 % cream Apply topically to affected area(s) once daily for 10 days 45 g 3    nystatin-triamcinolone (MYCOLOG) ointment Apply topically 2 (two) times daily for 14 days 30 g 2    tamoxifen (NOLVADEX) 20 MG Tab Take 1 tablet (20 mg total) by mouth once daily. 90 tablet 3     No current facility-administered medications for this visit.       Family History:   Family History   Problem Relation Age of Onset    Stroke Mother     No Known Problems Father     Stroke Maternal Grandmother        Social History:   Social History     Socioeconomic History    Marital status:    Tobacco Use    Smoking status: Never Smoker    Smokeless tobacco: Never Used   Substance and Sexual Activity    Alcohol use: No    Drug use: No       Review of Systems   Skin: Positive for itching and rash.        Objective:     Vitals:    06/13/22 1059   BP: 110/70   Pulse: 77   Resp: 20   Temp: 98.6 °F (37 °C)        Physical Exam    Assessment:       1. Urticaria    2. Intertrigo    3. Hypertension, unspecified type    4. Ductal carcinoma in situ (DCIS) of left breast        Plan:       Catrachita was seen today for rash and itching.    Diagnoses and all orders for this visit:    Urticaria  -     mometasone 0.1% (ELOCON) 0.1 % cream; Apply topically to affected area(s) once daily for 10 days  -     methylPREDNISolone (MEDROL DOSEPACK) 4 mg tablet; use as directed on package  -     hydrOXYzine HCL (ATARAX) 10 MG Tab; Take 3 tablets (30 mg total) by mouth nightly as needed.    Intertrigo  -     nystatin-triamcinolone (MYCOLOG) ointment; Apply topically 2 (two) times daily for 14 days    Hypertension, unspecified type  -     enalapril (VASOTEC) 10 MG tablet; Take 1 tablet (10 mg total) by mouth once daily.    Ductal carcinoma in situ (DCIS) of left breast  -     tamoxifen (NOLVADEX) 20 MG Tab; Take 1 tablet (20 mg total) by mouth once daily.         Follow up in about 1 month (around 7/13/2022), or if symptoms worsen or fail to improve.

## 2022-07-18 DIAGNOSIS — D05.12 INTRADUCTAL CARCINOMA IN SITU OF LEFT BREAST: Primary | ICD-10-CM

## 2022-07-18 DIAGNOSIS — Z79.810 USE OF SELECTIVE ESTROGEN RECEPTOR MODULATORS (SERMS): ICD-10-CM

## 2022-08-01 NOTE — PROGRESS NOTES
[facility] Hematology/Oncology  PROGRESS NOTE      Subjective:       Patient ID:   NAME: Catrachita Johnson : 1960     61 y.o. female    Referring Doc: Sridhar  Other Physicians: Marylin Gomez Eddington    Chief Complaint:  DCIS f/u    History of Present Illness:     Patient returns today for a regularly scheduled follow-up visit. She is here by herself.        She saw Dr Waller on 2022     She had recent bout of covid in 2022 and has since resolved    She denies any CP,SOB, HA's or N/V. She has been on tamoxifen and doing well with it.      She is due for mammogram on Aug 12th 2022    discussed covid19 precautions and she had her vaccinations      ROS:   GEN: normal without any fever, night sweats or weight loss  HEENT: normal with no HA's, sore throat, stiff neck, changes in vision  CV: normal with no CP, SOB, PND, JACKSON or orthopnea  PULM: normal with no SOB, cough, hemoptysis, sputum or pleuritic pain  GI: normal with no abdominal pain, nausea, vomiting, constipation, diarrhea, melanotic stools, BRBPR, or hematemesis  : normal with no hematuria, dysuria  BREAST: normal with no mass, discharge, pain  SKIN: normal with no rash, erythema, bruising, or swelling    Allergies:  Review of patient's allergies indicates:   Allergen Reactions    Bactrim [sulfamethoxazole-trimethoprim]     Cherries     Tetracyclines        Medications:    Current Outpatient Medications:     chlorhexidine (PERIDEX) 0.12 % solution, rinse and spit with one ounce every night at bedtime, Disp: 473 mL, Rfl: 0    enalapril (VASOTEC) 10 MG tablet, Take 1 tablet (10 mg total) by mouth once daily., Disp: 90 tablet, Rfl: 0    fexofenadine (ALLEGRA) 180 MG tablet, Take 1 tablet (180 mg total) by mouth once daily., Disp: 10 tablet, Rfl: 0    hydrocortisone 2.5 % cream, Apply topically 2 (two) times daily., Disp: 28 g, Rfl: 4    methylPREDNISolone (MEDROL DOSEPACK) 4 mg tablet, use as directed on package, Disp: 21 tablet, Rfl:  "0    nystatin (MYCOSTATIN) cream, Apply topically 2 (two) times daily., Disp: 15 g, Rfl: 1    tamoxifen (NOLVADEX) 20 MG Tab, Take 1 tablet (20 mg total) by mouth once daily., Disp: 90 tablet, Rfl: 3    mometasone 0.1% (ELOCON) 0.1 % cream, Apply topically to affected area(s) once daily for 10 days, Disp: 45 g, Rfl: 3    nystatin-triamcinolone (MYCOLOG) ointment, Apply topically 2 (two) times daily for 14 days, Disp: 30 g, Rfl: 2    PMHx/PSHx Updates:  See patient's last visit with me on 2/1/2022  See H&P on 9/1/2017        Pathology:  Cancer Staging  Ductal carcinoma in situ (DCIS) of left breast  Staging form: Onc Breast AJCC V7  - Clinical: Stage 0 (Tis (DCIS), N0, M0) - Signed by Arnie Gomez Jr., MD on 9/5/2017          Objective:     Vitals:  Blood pressure (!) 155/82, pulse 80, temperature 99.3 °F (37.4 °C), resp. rate 18, height 5' 3" (1.6 m), weight 79.8 kg (175 lb 14.4 oz).    Physical Examination:   GEN: no apparent distress, comfortable; AAOx3  HEAD: atraumatic and normocephalic  EYES: no pallor, no icterus, PERRLA  ENT: OMM, no pharyngeal erythema, external ears WNL; no nasal discharge; no thrush  NECK: no masses, thyroid normal, trachea midline, no LAD/LN's, supple  CV: RRR with no murmur; normal pulse; normal S1 and S2; no pedal edema  CHEST: Normal respiratory effort; CTAB; normal breath sounds; no wheeze or crackles  ABDOM: nontender and nondistended; soft; normal bowel sounds; no rebound/guarding  MUSC/Skeletal: ROM normal; no crepitus; joints normal; no deformities or arthropathy  EXTREM: no clubbing, cyanosis, inflammation ; prior ankle issues resolved  SKIN: no rashes, lesions, ulcers, petechiae or subcutaneous nodules  : no simons  NEURO: grossly intact; motor/sensory WNL; AAOx3; no tremors  PSYCH: normal mood, affect and behavior  LYMPH: normal cervical, supraclavicular, axillary and groin LN's  Breast: left breast with no changes, no LAD, masses etc          Labs:     8/2/2022 " pending     Lab Results   Component Value Date    WBC 5.2 02/22/2022    HGB 11.9 02/22/2022    HCT 37.2 02/22/2022    MCV 87.3 02/22/2022     02/22/2022     CMP  Sodium   Date Value Ref Range Status   02/22/2022 142 135 - 146 mmol/L Final   09/08/2017 141 134 - 144 mmol/L      Potassium   Date Value Ref Range Status   02/22/2022 4.3 3.5 - 5.3 mmol/L Final     Chloride   Date Value Ref Range Status   02/22/2022 107 98 - 110 mmol/L Final   09/08/2017 105 98 - 110 mmol/L      CO2   Date Value Ref Range Status   02/22/2022 27 20 - 32 mmol/L Final     Glucose   Date Value Ref Range Status   02/22/2022 88 65 - 99 mg/dL Final     Comment:                   Fasting reference interval        09/08/2017 83 70 - 99 mg/dL      BUN   Date Value Ref Range Status   02/22/2022 12 7 - 25 mg/dL Final     Creatinine   Date Value Ref Range Status   02/22/2022 0.74 0.50 - 0.99 mg/dL Final     Comment:     For patients >49 years of age, the reference limit  for Creatinine is approximately 13% higher for people  identified as -American.        09/08/2017 0.79 0.60 - 1.40 mg/dL      Calcium   Date Value Ref Range Status   02/22/2022 9.2 8.6 - 10.4 mg/dL Final     Total Protein   Date Value Ref Range Status   02/22/2022 6.9 6.1 - 8.1 g/dL Final     Albumin   Date Value Ref Range Status   02/22/2022 4.0 3.6 - 5.1 g/dL Final   09/08/2017 4.1 3.1 - 4.7 g/dL      Total Bilirubin   Date Value Ref Range Status   02/22/2022 0.3 0.2 - 1.2 mg/dL Final     Alkaline Phosphatase   Date Value Ref Range Status   07/30/2020 62 37 - 153 U/L Final     AST   Date Value Ref Range Status   02/22/2022 16 10 - 35 U/L Final     ALT   Date Value Ref Range Status   02/22/2022 13 6 - 29 U/L Final     eGFR if    Date Value Ref Range Status   02/22/2022 101 > OR = 60 mL/min/1.73m2 Final     eGFR if non    Date Value Ref Range Status   02/22/2022 87 > OR = 60 mL/min/1.73m2 Final               Radiology/Diagnostic  Studies:    Mammo 8/11/2021 on chart from [facility] - negative    Mammo 8/10/2020 on chart    I have reviewed all available lab results and radiology reports.    Assessment/Plan:   (1) 61 y.o. female with diagnosis of DCIS who has been referred by Dr Coulter for oncology evaluation  - followed by Dr Waldo Padilla with GYN  - positive family history of breast cancer (2nd degree relatives)  - DCIS with no invasive component  - ER+/MO+  - previously discussed the prognostics of DCIS especially the up to 25% risk of breast cancer development in patients who do not have resective surgery  - Dr Coulter with s/p lumpectomy on 9/13/2017; required radiation afterwards to provide equivocal survival benefit when compared to patient who have complete mastectomies  - I discussed the recommendation for postoperative radiation and she completed XRT on Nov 6th 2017  -  Previously discussed antihormone therapy with tamoxifen, and also discussed the side-effect profile and the need to f/u with GYN yearly  - mammo 7/22/2019 was negative with benign findings    2/3/2021:  - mammo on 8/20/2020 was negative  - due for next one on or after 8/21/2020  - mild borderline anemia  - check iron panel and B12/folate  - continued on tamoxifen    8/3/2021:   - mammo due next week Aug 2021 and already scheduled  - continued tamoxifen  - up to date labs pending    2/1/2022:  - latest mammo on 8/11/2021 negative - next one due in Aug 2022  - needs up to date labs  - doing well on tamoxifen    8/2/2022:  - mammo due after 8/10/2022 and already scheduled  - needs up to date labs  - continue on taxomifen     (2) Irritable bowel syndrome     (3) HTN - followed by PCP    (4) recovered from bout of covid Jan 2022      1. Ductal carcinoma in situ (DCIS) of left breast     2. Use of tamoxifen (Nolvadex)           PLAN:  1. Continue tamoxifen; check up to date labs every 6 months (encouraged compliance)  2. F/u with PCP, GenSurg, Rad/onc, GYN   3. Mammo is  scheduled for Aug 12th 2022    4. Check iron panel, B12/folate (pending)  5. RTC in 6 months  Fax note to  Jean-Pierre Waller MD, Patricia Coulter R. Eddington    Discussion:       COVID-19 Discussion:    I had long discussion with patient and any applicable family about the COVID-19 coronavirus epidemic and the recommended precautions with regard to cancer and/or hematology patients. I have re-iterated the CDC recommendations for adequate hand washing, use of hand -like products, and coughing into elbow, etc. In addition, especially for our patients who are on chemotherapy and/or our otherwise immunocompromised patients, I have recommended avoidance of crowds, including movie theaters, restaurants, churches, etc. I have recommended avoidance of any sick or symptomatic family members and/or friends. I have also recommended avoidance of any raw and unwashed food products, and general avoidance of food items that have not been prepared by themselves. The patient has been asked to call us immediately with any symptom developments, issues, questions or other general concerns.     I have explained all of the above in detail and the patient understands all of the current recommendation(s). I have answered all of their questions to the best of my ability and to their complete satisfaction.   The patient is to continue with the current management plan.            Electronically signed by Festus Walter MD                             Answers for HPI/ROS submitted by the patient on 7/30/2022  appetite change : No  unexpected weight change: No  mouth sores: No  visual disturbance: No  cough: No  shortness of breath: No  chest pain: No  abdominal pain: No  diarrhea: No  frequency: No  back pain: No  rash: Yes  headaches: No  adenopathy: No  nervous/ anxious: Yes

## 2022-08-02 ENCOUNTER — OFFICE VISIT (OUTPATIENT)
Dept: HEMATOLOGY/ONCOLOGY | Facility: CLINIC | Age: 62
End: 2022-08-02
Payer: COMMERCIAL

## 2022-08-02 VITALS
BODY MASS INDEX: 31.16 KG/M2 | SYSTOLIC BLOOD PRESSURE: 155 MMHG | DIASTOLIC BLOOD PRESSURE: 82 MMHG | WEIGHT: 175.88 LBS | TEMPERATURE: 99 F | HEART RATE: 80 BPM | RESPIRATION RATE: 18 BRPM | HEIGHT: 63 IN

## 2022-08-02 DIAGNOSIS — D05.12 DUCTAL CARCINOMA IN SITU (DCIS) OF LEFT BREAST: Primary | ICD-10-CM

## 2022-08-02 DIAGNOSIS — Z79.810 USE OF TAMOXIFEN (NOLVADEX): ICD-10-CM

## 2022-08-02 PROCEDURE — 3077F PR MOST RECENT SYSTOLIC BLOOD PRESSURE >= 140 MM HG: ICD-10-PCS | Mod: CPTII,S$GLB,, | Performed by: INTERNAL MEDICINE

## 2022-08-02 PROCEDURE — 3079F DIAST BP 80-89 MM HG: CPT | Mod: CPTII,S$GLB,, | Performed by: INTERNAL MEDICINE

## 2022-08-02 PROCEDURE — 1159F MED LIST DOCD IN RCRD: CPT | Mod: CPTII,S$GLB,, | Performed by: INTERNAL MEDICINE

## 2022-08-02 PROCEDURE — 4010F PR ACE/ARB THEARPY RXD/TAKEN: ICD-10-PCS | Mod: CPTII,S$GLB,, | Performed by: INTERNAL MEDICINE

## 2022-08-02 PROCEDURE — 1160F PR REVIEW ALL MEDS BY PRESCRIBER/CLIN PHARMACIST DOCUMENTED: ICD-10-PCS | Mod: CPTII,S$GLB,, | Performed by: INTERNAL MEDICINE

## 2022-08-02 PROCEDURE — 3079F PR MOST RECENT DIASTOLIC BLOOD PRESSURE 80-89 MM HG: ICD-10-PCS | Mod: CPTII,S$GLB,, | Performed by: INTERNAL MEDICINE

## 2022-08-02 PROCEDURE — 3077F SYST BP >= 140 MM HG: CPT | Mod: CPTII,S$GLB,, | Performed by: INTERNAL MEDICINE

## 2022-08-02 PROCEDURE — 4010F ACE/ARB THERAPY RXD/TAKEN: CPT | Mod: CPTII,S$GLB,, | Performed by: INTERNAL MEDICINE

## 2022-08-02 PROCEDURE — 1160F RVW MEDS BY RX/DR IN RCRD: CPT | Mod: CPTII,S$GLB,, | Performed by: INTERNAL MEDICINE

## 2022-08-02 PROCEDURE — 99213 PR OFFICE/OUTPT VISIT, EST, LEVL III, 20-29 MIN: ICD-10-PCS | Mod: S$GLB,,, | Performed by: INTERNAL MEDICINE

## 2022-08-02 PROCEDURE — 1159F PR MEDICATION LIST DOCUMENTED IN MEDICAL RECORD: ICD-10-PCS | Mod: CPTII,S$GLB,, | Performed by: INTERNAL MEDICINE

## 2022-08-02 PROCEDURE — 99213 OFFICE O/P EST LOW 20 MIN: CPT | Mod: S$GLB,,, | Performed by: INTERNAL MEDICINE

## 2022-08-02 PROCEDURE — 3008F PR BODY MASS INDEX (BMI) DOCUMENTED: ICD-10-PCS | Mod: CPTII,S$GLB,, | Performed by: INTERNAL MEDICINE

## 2022-08-02 PROCEDURE — 3008F BODY MASS INDEX DOCD: CPT | Mod: CPTII,S$GLB,, | Performed by: INTERNAL MEDICINE

## 2022-08-10 ENCOUNTER — HOSPITAL ENCOUNTER (OUTPATIENT)
Dept: RADIOLOGY | Facility: HOSPITAL | Age: 62
Discharge: HOME | End: 2022-08-10
Attending: INTERNAL MEDICINE
Payer: COMMERCIAL

## 2022-08-10 DIAGNOSIS — D05.12 DUCTAL CARCINOMA IN SITU (DCIS) OF LEFT BREAST: ICD-10-CM

## 2022-08-10 DIAGNOSIS — Z79.810 USE OF TAMOXIFEN (NOLVADEX): ICD-10-CM

## 2022-08-10 DIAGNOSIS — Z79.810 USE OF SELECTIVE ESTROGEN RECEPTOR MODULATORS (SERMS): ICD-10-CM

## 2022-08-10 DIAGNOSIS — D05.12 INTRADUCTAL CARCINOMA IN SITU OF LEFT BREAST: ICD-10-CM

## 2022-08-11 ENCOUNTER — TELEPHONE (OUTPATIENT)
Dept: HEMATOLOGY/ONCOLOGY | Facility: CLINIC | Age: 62
End: 2022-08-11

## 2022-08-11 DIAGNOSIS — Z79.810 USE OF TAMOXIFEN (NOLVADEX): ICD-10-CM

## 2022-08-11 DIAGNOSIS — D05.12 DUCTAL CARCINOMA IN SITU (DCIS) OF LEFT BREAST: Primary | ICD-10-CM

## 2022-09-13 ENCOUNTER — OFFICE VISIT (OUTPATIENT)
Dept: FAMILY MEDICINE | Facility: CLINIC | Age: 62
End: 2022-09-13
Payer: COMMERCIAL

## 2022-09-13 VITALS
SYSTOLIC BLOOD PRESSURE: 130 MMHG | OXYGEN SATURATION: 98 % | RESPIRATION RATE: 18 BRPM | DIASTOLIC BLOOD PRESSURE: 84 MMHG | HEIGHT: 63 IN | HEART RATE: 90 BPM | BODY MASS INDEX: 31.16 KG/M2

## 2022-09-13 DIAGNOSIS — J32.9 BACTERIAL SINUSITIS: Primary | ICD-10-CM

## 2022-09-13 DIAGNOSIS — B96.89 BACTERIAL SINUSITIS: Primary | ICD-10-CM

## 2022-09-13 DIAGNOSIS — J30.89 SEASONAL ALLERGIC RHINITIS DUE TO OTHER ALLERGIC TRIGGER: ICD-10-CM

## 2022-09-13 PROCEDURE — 3075F PR MOST RECENT SYSTOLIC BLOOD PRESS GE 130-139MM HG: ICD-10-PCS | Mod: CPTII,S$GLB,, | Performed by: NURSE PRACTITIONER

## 2022-09-13 PROCEDURE — 99213 OFFICE O/P EST LOW 20 MIN: CPT | Mod: S$GLB,,, | Performed by: NURSE PRACTITIONER

## 2022-09-13 PROCEDURE — 3008F BODY MASS INDEX DOCD: CPT | Mod: CPTII,S$GLB,, | Performed by: NURSE PRACTITIONER

## 2022-09-13 PROCEDURE — 4010F ACE/ARB THERAPY RXD/TAKEN: CPT | Mod: CPTII,S$GLB,, | Performed by: NURSE PRACTITIONER

## 2022-09-13 PROCEDURE — 3079F PR MOST RECENT DIASTOLIC BLOOD PRESSURE 80-89 MM HG: ICD-10-PCS | Mod: CPTII,S$GLB,, | Performed by: NURSE PRACTITIONER

## 2022-09-13 PROCEDURE — 3008F PR BODY MASS INDEX (BMI) DOCUMENTED: ICD-10-PCS | Mod: CPTII,S$GLB,, | Performed by: NURSE PRACTITIONER

## 2022-09-13 PROCEDURE — 3075F SYST BP GE 130 - 139MM HG: CPT | Mod: CPTII,S$GLB,, | Performed by: NURSE PRACTITIONER

## 2022-09-13 PROCEDURE — 1159F PR MEDICATION LIST DOCUMENTED IN MEDICAL RECORD: ICD-10-PCS | Mod: CPTII,S$GLB,, | Performed by: NURSE PRACTITIONER

## 2022-09-13 PROCEDURE — 1159F MED LIST DOCD IN RCRD: CPT | Mod: CPTII,S$GLB,, | Performed by: NURSE PRACTITIONER

## 2022-09-13 PROCEDURE — 4010F PR ACE/ARB THEARPY RXD/TAKEN: ICD-10-PCS | Mod: CPTII,S$GLB,, | Performed by: NURSE PRACTITIONER

## 2022-09-13 PROCEDURE — 99213 PR OFFICE/OUTPT VISIT, EST, LEVL III, 20-29 MIN: ICD-10-PCS | Mod: S$GLB,,, | Performed by: NURSE PRACTITIONER

## 2022-09-13 PROCEDURE — 3079F DIAST BP 80-89 MM HG: CPT | Mod: CPTII,S$GLB,, | Performed by: NURSE PRACTITIONER

## 2022-09-13 RX ORDER — AMOXICILLIN AND CLAVULANATE POTASSIUM 875; 125 MG/1; MG/1
1 TABLET, FILM COATED ORAL EVERY 12 HOURS
Qty: 14 TABLET | Refills: 0 | Status: SHIPPED | OUTPATIENT
Start: 2022-09-13 | End: 2022-09-20

## 2022-09-13 RX ORDER — PREDNISONE 20 MG/1
20 TABLET ORAL DAILY
Qty: 5 TABLET | Refills: 0 | Status: SHIPPED | OUTPATIENT
Start: 2022-09-13 | End: 2022-09-18

## 2022-09-13 RX ORDER — AZELASTINE 1 MG/ML
1 SPRAY, METERED NASAL 2 TIMES DAILY
Qty: 30 ML | Refills: 0 | Status: SHIPPED | OUTPATIENT
Start: 2022-09-13 | End: 2024-03-15

## 2022-09-14 NOTE — PROGRESS NOTES
Patient ID: Catrachita Johnson is a 62 y.o. female.    Chief Complaint: Headache     presents today for evaluation of unilateral headache with sinus pain and pressure. She states headache has been intermittent for approximately 3 months. She ta,kes naproxen daily to treat and she states she feels relief with heating pad. She denies fever or chills. She does endorse rhinitis from the nostril on the side where she has head ache but she denies any excessive sputum or mucous production. She states she does not have history of migraines. She denies thunderclap or any worrisome symptoms that will require urgent evaluation. She denies any other issues or complaints. This was a problem focused visit.     Headache   This is a chronic problem. The current episode started more than 1 month ago. The problem occurs constantly. The problem has been waxing and waning. The pain is located in the Right unilateral region. The pain radiates to the face, right shoulder and right neck. The pain quality is similar to prior headaches. The quality of the pain is described as aching (pressure). The pain is at a severity of 8/10. The pain is severe. Associated symptoms include ear pain, neck pain, rhinorrhea and sinus pressure. Pertinent negatives include no abdominal pain, coughing, dizziness, drainage, eye pain, eye redness, fever, hearing loss, nausea, sore throat or vomiting. The symptoms are aggravated by activity. She has tried NSAIDs (heating pad) for the symptoms. The treatment provided mild relief.   Otalgia   There is pain in the right ear. This is a new problem. The current episode started more than 1 month ago. The problem occurs every few hours. The problem has been gradually worsening. There has been no fever. The pain is at a severity of 10/10. The pain is severe. Associated symptoms include headaches, neck pain and rhinorrhea. Pertinent negatives include no abdominal pain, coughing, diarrhea, drainage, ear  discharge, hearing loss, rash, sore throat or vomiting. She has tried NSAIDs and heat packs for the symptoms. The treatment provided mild relief. There is no history of a chronic ear infection, hearing loss or a tympanostomy tube.         Past Medical History:   Diagnosis Date    Cancer     breast    Ductal carcinoma in situ (DCIS) of left breast 2017    Hypertension     Mass of left axilla     Use of tamoxifen (Nolvadex) 2017     Past Surgical History:   Procedure Laterality Date    BREAST SURGERY Left 2017    Lumpectomy     SECTION      MYOMECTOMY           Tobacco History:  reports that she has never smoked. She has never used smokeless tobacco.      Review of patient's allergies indicates:   Allergen Reactions    Cherries     Tetracyclines        Current Outpatient Medications:     enalapril (VASOTEC) 10 MG tablet, Take 1 tablet (10 mg total) by mouth once daily., Disp: 90 tablet, Rfl: 0    tamoxifen (NOLVADEX) 20 MG Tab, Take 1 tablet (20 mg total) by mouth once daily., Disp: 90 tablet, Rfl: 3    amoxicillin-clavulanate 875-125mg (AUGMENTIN) 875-125 mg per tablet, Take 1 tablet by mouth every 12 (twelve) hours for 7 days, Disp: 14 tablet, Rfl: 0    azelastine (ASTELIN) 137 mcg (0.1 %) nasal spray, Spray 1 spray (137 mcg total) by Nasal route 2 (two) times daily., Disp: 30 mL, Rfl: 0    chlorhexidine (PERIDEX) 0.12 % solution, rinse and spit with one ounce every night at bedtime (Patient not taking: Reported on 2022), Disp: 473 mL, Rfl: 0    fexofenadine (ALLEGRA) 180 MG tablet, Take 1 tablet (180 mg total) by mouth once daily. (Patient not taking: Reported on 2022), Disp: 10 tablet, Rfl: 0    mometasone 0.1% (ELOCON) 0.1 % cream, Apply topically to affected area(s) once daily for 10 days, Disp: 45 g, Rfl: 3    nystatin (MYCOSTATIN) cream, Apply topically 2 (two) times daily. (Patient not taking: Reported on 2022), Disp: 15 g, Rfl: 1    nystatin-triamcinolone (MYCOLOG)  "ointment, Apply topically 2 (two) times daily for 14 days, Disp: 30 g, Rfl: 2    predniSONE (DELTASONE) 20 MG tablet, Take 1 tablet (20 mg total) by mouth once daily for 5 days, Disp: 5 tablet, Rfl: 0    Review of Systems   Constitutional:  Negative for activity change, appetite change, fatigue, fever and unexpected weight change.   HENT:  Positive for ear pain, rhinorrhea and sinus pressure. Negative for congestion, ear discharge, hearing loss, mouth sores, nosebleeds, postnasal drip, sinus pain, sneezing, sore throat and trouble swallowing.    Eyes:  Negative for pain, redness and itching.   Respiratory:  Negative for cough, chest tightness and shortness of breath.    Cardiovascular:  Negative for chest pain and palpitations.   Gastrointestinal:  Negative for abdominal pain, blood in stool, constipation, diarrhea, nausea and vomiting.   Endocrine: Negative for cold intolerance, heat intolerance, polydipsia, polyphagia and polyuria.   Genitourinary:  Negative for difficulty urinating, dysuria, flank pain, frequency, genital sores, menstrual problem, urgency, vaginal bleeding and vaginal discharge.   Musculoskeletal:  Positive for neck pain. Negative for arthralgias and myalgias.   Skin:  Negative for rash and wound.   Allergic/Immunologic: Negative for environmental allergies and food allergies.   Neurological:  Positive for headaches. Negative for dizziness and light-headedness.   Hematological:  Negative for adenopathy. Does not bruise/bleed easily.   Psychiatric/Behavioral:  Negative for agitation, confusion, hallucinations, self-injury and sleep disturbance. The patient is not nervous/anxious.         Objective:      Vitals:    09/13/22 1301 09/13/22 1340   BP: (!) 140/110 130/84   Pulse: 90    Resp: 18    SpO2: 98%    Height: 5' 3" (1.6 m)      Physical Exam  Vitals reviewed.   Constitutional:       General: She is not in acute distress.     Appearance: Normal appearance. She is normal weight. She is not " ill-appearing, toxic-appearing or diaphoretic.   HENT:      Head: Normocephalic and atraumatic.      Right Ear: Tympanic membrane and external ear normal. There is no impacted cerumen.      Left Ear: Tympanic membrane and external ear normal. There is no impacted cerumen.      Nose: Septal deviation, nasal tenderness and rhinorrhea present. No congestion. Rhinorrhea is clear.      Right Sinus: Frontal sinus tenderness present.      Mouth/Throat:      Mouth: Mucous membranes are moist.      Pharynx: Oropharynx is clear. No oropharyngeal exudate or posterior oropharyngeal erythema.   Eyes:      General: No scleral icterus.        Right eye: No discharge.         Left eye: No discharge.      Extraocular Movements: Extraocular movements intact.      Conjunctiva/sclera: Conjunctivae normal.      Pupils: Pupils are equal, round, and reactive to light.   Neck:      Vascular: No carotid bruit.   Cardiovascular:      Rate and Rhythm: Normal rate and regular rhythm.      Pulses: Normal pulses.      Heart sounds: Normal heart sounds. No murmur heard.    No friction rub. No gallop.   Pulmonary:      Effort: Pulmonary effort is normal. No respiratory distress.      Breath sounds: Normal breath sounds. No stridor. No rales.   Chest:      Chest wall: No tenderness.   Abdominal:      General: Abdomen is flat. Bowel sounds are normal.      Palpations: Abdomen is soft. There is no mass.      Tenderness: There is no abdominal tenderness. There is no guarding.   Musculoskeletal:         General: No swelling or signs of injury. Normal range of motion.      Cervical back: Normal range of motion and neck supple. No rigidity or tenderness.      Right lower leg: No edema.      Left lower leg: No edema.   Skin:     General: Skin is warm and dry.      Capillary Refill: Capillary refill takes less than 2 seconds.      Findings: No bruising, lesion or rash.   Neurological:      General: No focal deficit present.      Mental Status: She is alert  and oriented to person, place, and time. Mental status is at baseline.      Motor: No weakness.      Coordination: Coordination normal.   Psychiatric:         Mood and Affect: Mood normal.         Behavior: Behavior normal.         Thought Content: Thought content normal.         Judgment: Judgment normal.         Assessment:       1. Bacterial sinusitis    2. Seasonal allergic rhinitis due to other allergic trigger           Plan:       Bacterial sinusitis  -     predniSONE (DELTASONE) 20 MG tablet; Take 1 tablet (20 mg total) by mouth once daily for 5 days  Dispense: 5 tablet; Refill: 0  -     amoxicillin-clavulanate 875-125mg (AUGMENTIN) 875-125 mg per tablet; Take 1 tablet by mouth every 12 (twelve) hours for 7 days  Dispense: 14 tablet; Refill: 0  -     azelastine (ASTELIN) 137 mcg (0.1 %) nasal spray; Spray 1 spray (137 mcg total) by Nasal route 2 (two) times daily.  Dispense: 30 mL; Refill: 0    Seasonal allergic rhinitis due to other allergic trigger  -     azelastine (ASTELIN) 137 mcg (0.1 %) nasal spray; Spray 1 spray (137 mcg total) by Nasal route 2 (two) times daily.  Dispense: 30 mL; Refill: 0      Follow up in about 10 days (around 9/23/2022), or if symptoms worsen or fail to improve, for Headache.      We will treat patient for possible bacterial sinusitis and I will revaluate symptoms in 10 days.  I will make further determinations regarding referral based on resolution of symptoms.Referral to ENT or Neurology may be warranted.     9/13/2022 Lesley Hackett NP

## 2022-09-23 ENCOUNTER — OFFICE VISIT (OUTPATIENT)
Dept: FAMILY MEDICINE | Facility: CLINIC | Age: 62
End: 2022-09-23
Payer: COMMERCIAL

## 2022-09-23 VITALS
HEART RATE: 78 BPM | OXYGEN SATURATION: 97 % | DIASTOLIC BLOOD PRESSURE: 78 MMHG | BODY MASS INDEX: 31.07 KG/M2 | HEIGHT: 63 IN | SYSTOLIC BLOOD PRESSURE: 132 MMHG | TEMPERATURE: 99 F | WEIGHT: 175.38 LBS | RESPIRATION RATE: 16 BRPM

## 2022-09-23 DIAGNOSIS — H92.09 OTALGIA, UNSPECIFIED LATERALITY: Primary | ICD-10-CM

## 2022-09-23 PROCEDURE — 99213 OFFICE O/P EST LOW 20 MIN: CPT | Mod: S$GLB,,, | Performed by: NURSE PRACTITIONER

## 2022-09-23 PROCEDURE — 3078F DIAST BP <80 MM HG: CPT | Mod: CPTII,S$GLB,, | Performed by: NURSE PRACTITIONER

## 2022-09-23 PROCEDURE — 3008F PR BODY MASS INDEX (BMI) DOCUMENTED: ICD-10-PCS | Mod: CPTII,S$GLB,, | Performed by: NURSE PRACTITIONER

## 2022-09-23 PROCEDURE — 99213 PR OFFICE/OUTPT VISIT, EST, LEVL III, 20-29 MIN: ICD-10-PCS | Mod: S$GLB,,, | Performed by: NURSE PRACTITIONER

## 2022-09-23 PROCEDURE — 1159F MED LIST DOCD IN RCRD: CPT | Mod: CPTII,S$GLB,, | Performed by: NURSE PRACTITIONER

## 2022-09-23 PROCEDURE — 3075F SYST BP GE 130 - 139MM HG: CPT | Mod: CPTII,S$GLB,, | Performed by: NURSE PRACTITIONER

## 2022-09-23 PROCEDURE — 3075F PR MOST RECENT SYSTOLIC BLOOD PRESS GE 130-139MM HG: ICD-10-PCS | Mod: CPTII,S$GLB,, | Performed by: NURSE PRACTITIONER

## 2022-09-23 PROCEDURE — 3078F PR MOST RECENT DIASTOLIC BLOOD PRESSURE < 80 MM HG: ICD-10-PCS | Mod: CPTII,S$GLB,, | Performed by: NURSE PRACTITIONER

## 2022-09-23 PROCEDURE — 3008F BODY MASS INDEX DOCD: CPT | Mod: CPTII,S$GLB,, | Performed by: NURSE PRACTITIONER

## 2022-09-23 PROCEDURE — 4010F PR ACE/ARB THEARPY RXD/TAKEN: ICD-10-PCS | Mod: CPTII,S$GLB,, | Performed by: NURSE PRACTITIONER

## 2022-09-23 PROCEDURE — 4010F ACE/ARB THERAPY RXD/TAKEN: CPT | Mod: CPTII,S$GLB,, | Performed by: NURSE PRACTITIONER

## 2022-09-23 PROCEDURE — 1159F PR MEDICATION LIST DOCUMENTED IN MEDICAL RECORD: ICD-10-PCS | Mod: CPTII,S$GLB,, | Performed by: NURSE PRACTITIONER

## 2022-09-23 NOTE — PROGRESS NOTES
Patient ID: Catrachita Johnson is a 62 y.o. female.    Chief Complaint: Follow-up      presents today for follow up of ear pain and pressure. She is a patient of Dr. Waller. She is doing well at this time and states she feels much better since treatment has been initiated and completed. She also states she did at home ear irrigation as advised and states that helped tremendously as well. She denies any other interval issues or complaints.     Otalgia   There is pain in both ears. This is a new problem. The current episode started 1 to 4 weeks ago. The problem has been resolved. The pain is at a severity of 0/10. The patient is experiencing no pain. Pertinent negatives include no abdominal pain, diarrhea, drainage, ear discharge, headaches, hearing loss, rash, rhinorrhea, sore throat or vomiting. She has tried NSAIDs and heat packs for the symptoms. The treatment provided significant relief. There is no history of a chronic ear infection, hearing loss or a tympanostomy tube.         Past Medical History:   Diagnosis Date    Cancer     breast    Ductal carcinoma in situ (DCIS) of left breast 2017    Hypertension     Mass of left axilla     Use of tamoxifen (Nolvadex) 2017     Past Surgical History:   Procedure Laterality Date    BREAST SURGERY Left 2017    Lumpectomy     SECTION      MYOMECTOMY           Tobacco History:  reports that she has never smoked. She has never used smokeless tobacco.      Review of patient's allergies indicates:   Allergen Reactions    Bactrim [sulfamethoxazole-trimethoprim] Hives and Itching    Cherries     Tetracyclines        Current Outpatient Medications:     azelastine (ASTELIN) 137 mcg (0.1 %) nasal spray, Spray 1 spray (137 mcg total) by Nasal route 2 (two) times daily., Disp: 30 mL, Rfl: 0    enalapril (VASOTEC) 10 MG tablet, Take 1 tablet (10 mg total) by mouth once daily., Disp: 90 tablet, Rfl: 0    tamoxifen (NOLVADEX) 20 MG Tab, Take 1 tablet  (20 mg total) by mouth once daily., Disp: 90 tablet, Rfl: 3    chlorhexidine (PERIDEX) 0.12 % solution, rinse and spit with one ounce every night at bedtime (Patient not taking: No sig reported), Disp: 473 mL, Rfl: 0    fexofenadine (ALLEGRA) 180 MG tablet, Take 1 tablet (180 mg total) by mouth once daily. (Patient not taking: No sig reported), Disp: 10 tablet, Rfl: 0    mometasone 0.1% (ELOCON) 0.1 % cream, Apply topically to affected area(s) once daily for 10 days, Disp: 45 g, Rfl: 3    nystatin (MYCOSTATIN) cream, Apply topically 2 (two) times daily. (Patient not taking: No sig reported), Disp: 15 g, Rfl: 1    nystatin-triamcinolone (MYCOLOG) ointment, Apply topically 2 (two) times daily for 14 days, Disp: 30 g, Rfl: 2    Review of Systems   Constitutional:  Negative for activity change, appetite change, fatigue, fever and unexpected weight change.   HENT:  Positive for ear pain. Negative for congestion, ear discharge, hearing loss, mouth sores, nosebleeds, postnasal drip, rhinorrhea, sinus pressure, sinus pain, sneezing, sore throat and trouble swallowing.    Eyes:  Negative for pain, redness and itching.   Respiratory:  Negative for chest tightness and shortness of breath.    Cardiovascular:  Negative for chest pain and palpitations.   Gastrointestinal:  Negative for abdominal pain, blood in stool, constipation, diarrhea, nausea and vomiting.   Endocrine: Negative for cold intolerance, heat intolerance, polydipsia, polyphagia and polyuria.   Genitourinary:  Negative for difficulty urinating, dysuria, flank pain, frequency, genital sores, menstrual problem, urgency, vaginal bleeding and vaginal discharge.   Musculoskeletal:  Negative for arthralgias and myalgias.   Skin:  Negative for rash and wound.   Allergic/Immunologic: Negative for environmental allergies and food allergies.   Neurological:  Negative for dizziness, light-headedness and headaches.   Hematological:  Negative for adenopathy. Does not  "bruise/bleed easily.   Psychiatric/Behavioral:  Negative for agitation, confusion, hallucinations, self-injury and sleep disturbance. The patient is not nervous/anxious.         Objective:      Vitals:    09/23/22 1022   BP: 132/78   Pulse: 78   Resp: 16   Temp: 98.6 °F (37 °C)   TempSrc: Oral   SpO2: 97%   Weight: 79.6 kg (175 lb 6.4 oz)   Height: 5' 3" (1.6 m)     Physical Exam  Vitals reviewed.   Constitutional:       General: She is not in acute distress.     Appearance: Normal appearance. She is normal weight. She is not ill-appearing, toxic-appearing or diaphoretic.   HENT:      Head: Normocephalic and atraumatic.      Right Ear: Tympanic membrane and external ear normal. There is no impacted cerumen.      Left Ear: Tympanic membrane and external ear normal. There is no impacted cerumen.      Nose: Nose normal. No congestion or rhinorrhea.      Mouth/Throat:      Mouth: Mucous membranes are moist.      Pharynx: Oropharynx is clear. No oropharyngeal exudate or posterior oropharyngeal erythema.   Eyes:      General: No scleral icterus.        Right eye: No discharge.         Left eye: No discharge.      Extraocular Movements: Extraocular movements intact.      Conjunctiva/sclera: Conjunctivae normal.      Pupils: Pupils are equal, round, and reactive to light.   Neck:      Vascular: No carotid bruit.   Cardiovascular:      Rate and Rhythm: Normal rate and regular rhythm.      Pulses: Normal pulses.      Heart sounds: Normal heart sounds. No murmur heard.    No friction rub. No gallop.   Pulmonary:      Effort: Pulmonary effort is normal. No respiratory distress.      Breath sounds: Normal breath sounds. No stridor. No rales.   Chest:      Chest wall: No tenderness.   Abdominal:      General: Abdomen is flat. Bowel sounds are normal.      Palpations: Abdomen is soft. There is no mass.      Tenderness: There is no abdominal tenderness. There is no guarding.   Musculoskeletal:         General: No swelling or signs " of injury. Normal range of motion.      Cervical back: Normal range of motion and neck supple. No rigidity or tenderness.      Right lower leg: No edema.      Left lower leg: No edema.   Skin:     General: Skin is warm and dry.      Capillary Refill: Capillary refill takes less than 2 seconds.      Findings: No bruising, lesion or rash.   Neurological:      General: No focal deficit present.      Mental Status: She is alert and oriented to person, place, and time. Mental status is at baseline.      Motor: No weakness.      Coordination: Coordination normal.   Psychiatric:         Mood and Affect: Mood normal.         Behavior: Behavior normal.         Thought Content: Thought content normal.         Judgment: Judgment normal.         Assessment:       1. Otalgia, unspecified laterality           Plan:       Otalgia, unspecified laterality  Patient advised to notify us if symptoms worsen but no additional follow up is required at this time. She is advised to keep scheduled follow up with Dr. Waller.     No follow-ups on file.        9/23/2022 Lesley Hackett NP

## 2022-10-06 DIAGNOSIS — J32.9 BACTERIAL SINUSITIS: ICD-10-CM

## 2022-10-06 DIAGNOSIS — B96.89 BACTERIAL SINUSITIS: ICD-10-CM

## 2022-10-06 DIAGNOSIS — I10 HYPERTENSION, UNSPECIFIED TYPE: ICD-10-CM

## 2022-10-06 DIAGNOSIS — J30.89 SEASONAL ALLERGIC RHINITIS DUE TO OTHER ALLERGIC TRIGGER: ICD-10-CM

## 2022-10-06 RX ORDER — ENALAPRIL MALEATE 10 MG/1
10 TABLET ORAL DAILY
Qty: 90 TABLET | Refills: 1 | Status: SHIPPED | OUTPATIENT
Start: 2022-10-06 | End: 2023-06-11 | Stop reason: SDUPTHER

## 2022-10-07 RX ORDER — AZELASTINE 1 MG/ML
1 SPRAY, METERED NASAL 2 TIMES DAILY
Qty: 30 ML | Refills: 0 | OUTPATIENT
Start: 2022-10-07 | End: 2023-01-24

## 2023-01-24 ENCOUNTER — TELEPHONE (OUTPATIENT)
Dept: HEMATOLOGY/ONCOLOGY | Facility: CLINIC | Age: 63
End: 2023-01-24

## 2023-01-24 DIAGNOSIS — D05.12 DUCTAL CARCINOMA IN SITU (DCIS) OF LEFT BREAST: ICD-10-CM

## 2023-01-24 DIAGNOSIS — Z79.810 USE OF TAMOXIFEN (NOLVADEX): Primary | ICD-10-CM

## 2023-01-26 LAB
ALBUMIN SERPL-MCNC: 3.8 G/DL (ref 3.6–5.1)
ALBUMIN/GLOB SERPL: 1.2 (CALC) (ref 1–2.5)
ALP SERPL-CCNC: 67 U/L (ref 37–153)
ALT SERPL-CCNC: 14 U/L (ref 6–29)
AST SERPL-CCNC: 16 U/L (ref 10–35)
BASOPHILS # BLD AUTO: 51 CELLS/UL (ref 0–200)
BASOPHILS NFR BLD AUTO: 0.8 %
BILIRUB SERPL-MCNC: 0.2 MG/DL (ref 0.2–1.2)
BUN SERPL-MCNC: 14 MG/DL (ref 7–25)
BUN/CREAT SERPL: NORMAL (CALC) (ref 6–22)
CALCIUM SERPL-MCNC: 9.2 MG/DL (ref 8.6–10.4)
CHLORIDE SERPL-SCNC: 107 MMOL/L (ref 98–110)
CO2 SERPL-SCNC: 30 MMOL/L (ref 20–32)
CREAT SERPL-MCNC: 0.84 MG/DL (ref 0.5–1.05)
EGFR: 78 ML/MIN/1.73M2
EOSINOPHIL # BLD AUTO: 122 CELLS/UL (ref 15–500)
EOSINOPHIL NFR BLD AUTO: 1.9 %
ERYTHROCYTE [DISTWIDTH] IN BLOOD BY AUTOMATED COUNT: 12.7 % (ref 11–15)
FERRITIN SERPL-MCNC: 84 NG/ML (ref 16–288)
FOLATE SERPL-MCNC: 10.6 NG/ML
GLOBULIN SER CALC-MCNC: 3.1 G/DL (CALC) (ref 1.9–3.7)
GLUCOSE SERPL-MCNC: 103 MG/DL (ref 65–139)
HCT VFR BLD AUTO: 36.3 % (ref 35–45)
HGB BLD-MCNC: 11.8 G/DL (ref 11.7–15.5)
IRON SATN MFR SERPL: 21 % (CALC) (ref 16–45)
IRON SERPL-MCNC: 73 MCG/DL (ref 45–160)
LYMPHOCYTES # BLD AUTO: 2029 CELLS/UL (ref 850–3900)
LYMPHOCYTES NFR BLD AUTO: 31.7 %
MCH RBC QN AUTO: 28.2 PG (ref 27–33)
MCHC RBC AUTO-ENTMCNC: 32.5 G/DL (ref 32–36)
MCV RBC AUTO: 86.6 FL (ref 80–100)
MONOCYTES # BLD AUTO: 480 CELLS/UL (ref 200–950)
MONOCYTES NFR BLD AUTO: 7.5 %
NEUTROPHILS # BLD AUTO: 3718 CELLS/UL (ref 1500–7800)
NEUTROPHILS NFR BLD AUTO: 58.1 %
PLATELET # BLD AUTO: 268 THOUSAND/UL (ref 140–400)
PMV BLD REES-ECKER: 9.6 FL (ref 7.5–12.5)
POTASSIUM SERPL-SCNC: 4.5 MMOL/L (ref 3.5–5.3)
PROT SERPL-MCNC: 6.9 G/DL (ref 6.1–8.1)
RBC # BLD AUTO: 4.19 MILLION/UL (ref 3.8–5.1)
SODIUM SERPL-SCNC: 140 MMOL/L (ref 135–146)
TIBC SERPL-MCNC: 347 MCG/DL (CALC) (ref 250–450)
VIT B12 SERPL-MCNC: 409 PG/ML (ref 200–1100)
WBC # BLD AUTO: 6.4 THOUSAND/UL (ref 3.8–10.8)

## 2023-01-30 NOTE — PROGRESS NOTES
[facility]  PROGRESS NOTE      Subjective:       Patient ID:   NAME: Catrachita Johnson : 1960     62 y.o. female    Referring Doc: Sridhar  Other Physicians: Marylin Gomez Eddington    Chief Complaint:  DCIS f/u    History of Present Illness:     Patient returns today for a regularly scheduled follow-up visit. She is here by herself.        She saw Roxann MENDEZ in 2022         She denies any CP,SOB, HA's or N/V. She has been on tamoxifen and doing well with it.      She is due for mammogram in Aug 2023    discussed covid19 precautions and she had her vaccinations      ROS:   GEN: normal without any fever, night sweats or weight loss  HEENT: normal with no HA's, sore throat, stiff neck, changes in vision  CV: normal with no CP, SOB, PND, JACKSON or orthopnea  PULM: normal with no SOB, cough, hemoptysis, sputum or pleuritic pain  GI: normal with no abdominal pain, nausea, vomiting, constipation, diarrhea, melanotic stools, BRBPR, or hematemesis  : normal with no hematuria, dysuria  BREAST: normal with no mass, discharge, pain  SKIN: normal with no rash, erythema, bruising, or swelling    Allergies:  Review of patient's allergies indicates:   Allergen Reactions    Bactrim [sulfamethoxazole-trimethoprim]     Cherries     Tetracyclines        Medications:    Current Outpatient Medications:     enalapril (VASOTEC) 10 MG tablet, Take 1 tablet (10 mg total) by mouth once daily., Disp: 90 tablet, Rfl: 1    tamoxifen (NOLVADEX) 20 MG Tab, Take 1 tablet (20 mg total) by mouth once daily., Disp: 90 tablet, Rfl: 3    amoxicillin (AMOXIL) 500 MG capsule, Take 1 capsule three times a day for 10 days, Disp: 30 capsule, Rfl: 0    amoxicillin-clavulanate 875-125mg (AUGMENTIN) 875-125 mg per tablet, Take 1 tablet twice a day until all gone, Disp: 14 tablet, Rfl: 0    azelastine (ASTELIN) 137 mcg (0.1 %) nasal spray, Spray 1 spray (137 mcg total) by Nasal route 2 (two) times daily., Disp: 30 mL, Rfl: 0     "chlorhexidine (PERIDEX) 0.12 % solution, rinse and spit with one ounce every night at bedtime (Patient not taking: Reported on 9/13/2022), Disp: 473 mL, Rfl: 0    fexofenadine (ALLEGRA) 180 MG tablet, Take 1 tablet (180 mg total) by mouth once daily. (Patient not taking: No sig reported), Disp: 10 tablet, Rfl: 0    ibuprofen (ADVIL,MOTRIN) 800 MG tablet, Take 1 tablet every 6 to 8 hours as needed for pain., Disp: 30 tablet, Rfl: 0    mometasone 0.1% (ELOCON) 0.1 % cream, Apply topically to affected area(s) once daily for 10 days, Disp: 45 g, Rfl: 3    nystatin (MYCOSTATIN) cream, Apply topically 2 (two) times daily., Disp: 15 g, Rfl: 1    nystatin-triamcinolone (MYCOLOG) ointment, Apply topically 2 (two) times daily for 14 days, Disp: 30 g, Rfl: 2    traMADoL (ULTRAM) 50 mg tablet, Take 1 tablet every 4 to 6 hours as needed for pain., Disp: 16 tablet, Rfl: 0    PMHx/PSHx Updates:  See patient's last visit with me on 8/2/2022  See H&P on 9/1/2017        Pathology:  Cancer Staging  Ductal carcinoma in situ (DCIS) of left breast  Staging form: Onc Breast AJCC V7  - Clinical: Stage 0 (Tis (DCIS), N0, M0) - Signed by Arnie Gomez Jr., MD on 9/5/2017          Objective:     Vitals:  Blood pressure (!) 154/85, pulse 77, temperature 97.2 °F (36.2 °C), resp. rate 18, height 5' 3" (1.6 m), weight 79.7 kg (175 lb 12.8 oz).    Physical Examination:   GEN: no apparent distress, comfortable; AAOx3  HEAD: atraumatic and normocephalic  EYES: no pallor, no icterus, PERRLA  ENT: OMM, no pharyngeal erythema, external ears WNL; no nasal discharge; no thrush  NECK: no masses, thyroid normal, trachea midline, no LAD/LN's, supple  CV: RRR with no murmur; normal pulse; normal S1 and S2; no pedal edema  CHEST: Normal respiratory effort; CTAB; normal breath sounds; no wheeze or crackles  ABDOM: nontender and nondistended; soft; normal bowel sounds; no rebound/guarding  MUSC/Skeletal: ROM normal; no crepitus; joints normal; no deformities " or arthropathy  EXTREM: no clubbing, cyanosis, inflammation ;no swelling  SKIN: no  lesions, ulcers, petechiae or subcutaneous nodules; mild rash on arm  : no simons  NEURO: grossly intact; motor/sensory WNL; AAOx3; no tremors  PSYCH: normal mood, affect and behavior  LYMPH: normal cervical, supraclavicular, axillary and groin LN's  Breast: left breast with no changes, no LAD, masses etc (Lis present)          Labs:           Lab Results   Component Value Date    WBC 6.4 01/25/2023    HGB 11.8 01/25/2023    HCT 36.3 01/25/2023    MCV 86.6 01/25/2023     01/25/2023     CMP  Sodium   Date Value Ref Range Status   01/25/2023 140 135 - 146 mmol/L Final   09/08/2017 141 134 - 144 mmol/L      Potassium   Date Value Ref Range Status   01/25/2023 4.5 3.5 - 5.3 mmol/L Final     Chloride   Date Value Ref Range Status   01/25/2023 107 98 - 110 mmol/L Final   09/08/2017 105 98 - 110 mmol/L      CO2   Date Value Ref Range Status   01/25/2023 30 20 - 32 mmol/L Final     Glucose   Date Value Ref Range Status   01/25/2023 103 65 - 139 mg/dL Final     Comment:               Non-fasting reference interval        09/08/2017 83 70 - 99 mg/dL      BUN   Date Value Ref Range Status   01/25/2023 14 7 - 25 mg/dL Final     Creatinine   Date Value Ref Range Status   01/25/2023 0.84 0.50 - 1.05 mg/dL Final   09/08/2017 0.79 0.60 - 1.40 mg/dL      Calcium   Date Value Ref Range Status   01/25/2023 9.2 8.6 - 10.4 mg/dL Final     Total Protein   Date Value Ref Range Status   01/25/2023 6.9 6.1 - 8.1 g/dL Final     Albumin   Date Value Ref Range Status   01/25/2023 3.8 3.6 - 5.1 g/dL Final   09/08/2017 4.1 3.1 - 4.7 g/dL      Total Bilirubin   Date Value Ref Range Status   01/25/2023 0.2 0.2 - 1.2 mg/dL Final     Alkaline Phosphatase   Date Value Ref Range Status   07/30/2020 62 37 - 153 U/L Final     AST   Date Value Ref Range Status   01/25/2023 16 10 - 35 U/L Final     ALT   Date Value Ref Range Status   01/25/2023 14 6 - 29 U/L  Final     eGFR if    Date Value Ref Range Status   02/22/2022 101 > OR = 60 mL/min/1.73m2 Final     eGFR if non    Date Value Ref Range Status   02/22/2022 87 > OR = 60 mL/min/1.73m2 Final               Radiology/Diagnostic Studies:    Mammo 8/11/2021 on chart from [facility] - negative    Mammo 8/10/2020 on chart    I have reviewed all available lab results and radiology reports.    Assessment/Plan:   (1) 62 y.o. female with diagnosis of DCIS who has been referred by Dr Coulter for oncology evaluation  - followed by Dr Waldo Padilla with GYN  - positive family history of breast cancer (2nd degree relatives)  - DCIS with no invasive component  - ER+/IN+  - previously discussed the prognostics of DCIS especially the up to 25% risk of breast cancer development in patients who do not have resective surgery  - Dr Coulter with s/p lumpectomy on 9/13/2017; required radiation afterwards to provide equivocal survival benefit when compared to patient who have complete mastectomies  - I discussed the recommendation for postoperative radiation and she completed XRT on Nov 6th 2017  -  Previously discussed antihormone therapy with tamoxifen, and also discussed the side-effect profile and the need to f/u with GYN yearly  - mammo 7/22/2019 was negative with benign findings    2/3/2021:  - mammo on 8/20/2020 was negative  - due for next one on or after 8/21/2020  - mild borderline anemia  - check iron panel and B12/folate  - continued on tamoxifen    8/3/2021:   - mammo due next week Aug 2021 and already scheduled  - continued tamoxifen  - up to date labs pending    2/1/2022:  - latest mammo on 8/11/2021 negative - next one due in Aug 2022  - needs up to date labs  - doing well on tamoxifen    8/2/2022:  - mammo due after 8/10/2022 and already scheduled  - needs up to date labs  - continue on taxomifen    1/31/2023:  - mammo 8/26/2022 negative  - continued on tamoxifen  - she plans to establish with new  GYN in near future     (2) Irritable bowel syndrome     (3) HTN - followed by PCP    (4) recovered from bout of covid Jan 2022      1. Ductal carcinoma in situ (DCIS) of left breast        2. Use of tamoxifen (Nolvadex)              PLAN:  1. Continue tamoxifen; check up to date labs every 6 months (encouraged compliance)  2. F/u with PCP, GenSurg, Rad/onc, GYN   3. Mammo is scheduled for Aug 2023  4. RTC in 6 months  Fax note to  Jean-Pierre Waller MD, Patricia Coulter R. Eddington    Discussion:       COVID-19 Discussion:    I had long discussion with patient and any applicable family about the COVID-19 coronavirus epidemic and the recommended precautions with regard to cancer and/or hematology patients. I have re-iterated the CDC recommendations for adequate hand washing, use of hand -like products, and coughing into elbow, etc. In addition, especially for our patients who are on chemotherapy and/or our otherwise immunocompromised patients, I have recommended avoidance of crowds, including movie theaters, restaurants, churches, etc. I have recommended avoidance of any sick or symptomatic family members and/or friends. I have also recommended avoidance of any raw and unwashed food products, and general avoidance of food items that have not been prepared by themselves. The patient has been asked to call us immediately with any symptom developments, issues, questions or other general concerns.     I have explained all of the above in detail and the patient understands all of the current recommendation(s). I have answered all of their questions to the best of my ability and to their complete satisfaction.   The patient is to continue with the current management plan.            Electronically signed by Festus Walter MD                          Answers submitted by the patient for this visit:  Review of Systems Questionnaire (Submitted on 1/24/2023)  appetite change : No  unexpected weight change: No  mouth  sores: No  visual disturbance: No  cough: No  shortness of breath: No  chest pain: No  abdominal pain: No  diarrhea: No  frequency: No  back pain: No  rash: Yes  headaches: No  adenopathy: No  nervous/ anxious: No

## 2023-01-31 ENCOUNTER — OFFICE VISIT (OUTPATIENT)
Dept: HEMATOLOGY/ONCOLOGY | Facility: CLINIC | Age: 63
End: 2023-01-31
Payer: COMMERCIAL

## 2023-01-31 VITALS
HEART RATE: 77 BPM | WEIGHT: 175.81 LBS | TEMPERATURE: 97 F | RESPIRATION RATE: 18 BRPM | BODY MASS INDEX: 31.15 KG/M2 | DIASTOLIC BLOOD PRESSURE: 85 MMHG | HEIGHT: 63 IN | SYSTOLIC BLOOD PRESSURE: 154 MMHG

## 2023-01-31 DIAGNOSIS — Z79.810 USE OF TAMOXIFEN (NOLVADEX): ICD-10-CM

## 2023-01-31 DIAGNOSIS — D05.12 DUCTAL CARCINOMA IN SITU (DCIS) OF LEFT BREAST: Primary | ICD-10-CM

## 2023-01-31 PROCEDURE — 3077F PR MOST RECENT SYSTOLIC BLOOD PRESSURE >= 140 MM HG: ICD-10-PCS | Mod: CPTII,S$GLB,, | Performed by: INTERNAL MEDICINE

## 2023-01-31 PROCEDURE — 99214 PR OFFICE/OUTPT VISIT, EST, LEVL IV, 30-39 MIN: ICD-10-PCS | Mod: S$GLB,,, | Performed by: INTERNAL MEDICINE

## 2023-01-31 PROCEDURE — 3008F PR BODY MASS INDEX (BMI) DOCUMENTED: ICD-10-PCS | Mod: CPTII,S$GLB,, | Performed by: INTERNAL MEDICINE

## 2023-01-31 PROCEDURE — 1160F RVW MEDS BY RX/DR IN RCRD: CPT | Mod: CPTII,S$GLB,, | Performed by: INTERNAL MEDICINE

## 2023-01-31 PROCEDURE — 3079F DIAST BP 80-89 MM HG: CPT | Mod: CPTII,S$GLB,, | Performed by: INTERNAL MEDICINE

## 2023-01-31 PROCEDURE — 1159F PR MEDICATION LIST DOCUMENTED IN MEDICAL RECORD: ICD-10-PCS | Mod: CPTII,S$GLB,, | Performed by: INTERNAL MEDICINE

## 2023-01-31 PROCEDURE — 99214 OFFICE O/P EST MOD 30 MIN: CPT | Mod: S$GLB,,, | Performed by: INTERNAL MEDICINE

## 2023-01-31 PROCEDURE — 3008F BODY MASS INDEX DOCD: CPT | Mod: CPTII,S$GLB,, | Performed by: INTERNAL MEDICINE

## 2023-01-31 PROCEDURE — 1160F PR REVIEW ALL MEDS BY PRESCRIBER/CLIN PHARMACIST DOCUMENTED: ICD-10-PCS | Mod: CPTII,S$GLB,, | Performed by: INTERNAL MEDICINE

## 2023-01-31 PROCEDURE — 3077F SYST BP >= 140 MM HG: CPT | Mod: CPTII,S$GLB,, | Performed by: INTERNAL MEDICINE

## 2023-01-31 PROCEDURE — 1159F MED LIST DOCD IN RCRD: CPT | Mod: CPTII,S$GLB,, | Performed by: INTERNAL MEDICINE

## 2023-01-31 PROCEDURE — 3079F PR MOST RECENT DIASTOLIC BLOOD PRESSURE 80-89 MM HG: ICD-10-PCS | Mod: CPTII,S$GLB,, | Performed by: INTERNAL MEDICINE

## 2023-04-06 ENCOUNTER — TELEPHONE (OUTPATIENT)
Dept: HEMATOLOGY/ONCOLOGY | Facility: CLINIC | Age: 63
End: 2023-04-06

## 2023-04-06 NOTE — TELEPHONE ENCOUNTER
Message reviewed with Dr. Walter, per his verbal orders I spoke to patient and made aware that he advises against the testosterone pellets. Patient verbalized understanding. Patient wanted Dr. Walter's opinion on another medication that was offered to her, she can not remember the name, informed her that once she finds out the name of the med she can call back and I will ask Dr. Walter his opinions on that one also. Verbalized understanding.

## 2023-04-06 NOTE — TELEPHONE ENCOUNTER
----- Message from Hattie Lopes sent at 4/6/2023 11:21 AM CDT -----  Pt is asking if she can have Testosterone pellets with Anastrazole. It has no estrogen.     [phone]

## 2023-06-11 DIAGNOSIS — I10 HYPERTENSION, UNSPECIFIED TYPE: ICD-10-CM

## 2023-06-12 RX ORDER — ENALAPRIL MALEATE 10 MG/1
10 TABLET ORAL DAILY
Qty: 90 TABLET | Refills: 0 | Status: SHIPPED | OUTPATIENT
Start: 2023-06-12 | End: 2023-09-09 | Stop reason: SDUPTHER

## 2023-06-22 DIAGNOSIS — D05.12 DUCTAL CARCINOMA IN SITU (DCIS) OF LEFT BREAST: ICD-10-CM

## 2023-06-22 RX ORDER — TAMOXIFEN CITRATE 20 MG/1
20 TABLET ORAL DAILY
Qty: 90 TABLET | Refills: 0 | Status: SHIPPED | OUTPATIENT
Start: 2023-06-22 | End: 2023-09-20 | Stop reason: SDUPTHER

## 2023-07-31 ENCOUNTER — OFFICE VISIT (OUTPATIENT)
Dept: FAMILY MEDICINE | Facility: CLINIC | Age: 63
End: 2023-07-31
Payer: COMMERCIAL

## 2023-07-31 VITALS
TEMPERATURE: 98 F | OXYGEN SATURATION: 97 % | HEART RATE: 89 BPM | BODY MASS INDEX: 31.01 KG/M2 | SYSTOLIC BLOOD PRESSURE: 132 MMHG | WEIGHT: 175 LBS | HEIGHT: 63 IN | DIASTOLIC BLOOD PRESSURE: 82 MMHG

## 2023-07-31 DIAGNOSIS — S86.899A ANTERIOR SHIN SPLINTS: Primary | ICD-10-CM

## 2023-07-31 PROCEDURE — 1159F PR MEDICATION LIST DOCUMENTED IN MEDICAL RECORD: ICD-10-PCS | Mod: CPTII,S$GLB,, | Performed by: NURSE PRACTITIONER

## 2023-07-31 PROCEDURE — 3075F SYST BP GE 130 - 139MM HG: CPT | Mod: CPTII,S$GLB,, | Performed by: NURSE PRACTITIONER

## 2023-07-31 PROCEDURE — 4010F PR ACE/ARB THEARPY RXD/TAKEN: ICD-10-PCS | Mod: CPTII,S$GLB,, | Performed by: NURSE PRACTITIONER

## 2023-07-31 PROCEDURE — 1159F MED LIST DOCD IN RCRD: CPT | Mod: CPTII,S$GLB,, | Performed by: NURSE PRACTITIONER

## 2023-07-31 PROCEDURE — 3008F BODY MASS INDEX DOCD: CPT | Mod: CPTII,S$GLB,, | Performed by: NURSE PRACTITIONER

## 2023-07-31 PROCEDURE — 99215 OFFICE O/P EST HI 40 MIN: CPT | Mod: S$GLB,,, | Performed by: NURSE PRACTITIONER

## 2023-07-31 PROCEDURE — 3079F DIAST BP 80-89 MM HG: CPT | Mod: CPTII,S$GLB,, | Performed by: NURSE PRACTITIONER

## 2023-07-31 PROCEDURE — 3075F PR MOST RECENT SYSTOLIC BLOOD PRESS GE 130-139MM HG: ICD-10-PCS | Mod: CPTII,S$GLB,, | Performed by: NURSE PRACTITIONER

## 2023-07-31 PROCEDURE — 1160F RVW MEDS BY RX/DR IN RCRD: CPT | Mod: CPTII,S$GLB,, | Performed by: NURSE PRACTITIONER

## 2023-07-31 PROCEDURE — 3079F PR MOST RECENT DIASTOLIC BLOOD PRESSURE 80-89 MM HG: ICD-10-PCS | Mod: CPTII,S$GLB,, | Performed by: NURSE PRACTITIONER

## 2023-07-31 PROCEDURE — 4010F ACE/ARB THERAPY RXD/TAKEN: CPT | Mod: CPTII,S$GLB,, | Performed by: NURSE PRACTITIONER

## 2023-07-31 PROCEDURE — 3008F PR BODY MASS INDEX (BMI) DOCUMENTED: ICD-10-PCS | Mod: CPTII,S$GLB,, | Performed by: NURSE PRACTITIONER

## 2023-07-31 PROCEDURE — 99215 PR OFFICE/OUTPT VISIT, EST, LEVL V, 40-54 MIN: ICD-10-PCS | Mod: S$GLB,,, | Performed by: NURSE PRACTITIONER

## 2023-07-31 PROCEDURE — 1160F PR REVIEW ALL MEDS BY PRESCRIBER/CLIN PHARMACIST DOCUMENTED: ICD-10-PCS | Mod: CPTII,S$GLB,, | Performed by: NURSE PRACTITIONER

## 2023-07-31 NOTE — PROGRESS NOTES
SUBJECTIVE:      Patient ID: Catrachita Johnson is a 62 y.o. female.    Chief Complaint: Leg Pain    62-year-old female with a history of left breast cancer and HTN presents to the clinic with complaints of bilateral leg pain.  Symptoms started last Thursday. Reports sharp shooting pains started at the anterior lower shin and radiating up.  Occurs at random, but has occurred daily since Thursday. She got new shoe inserts early last week.  Symptoms improved with ibuprofen. Denies injury or trauma, lower back pain, hx of diabetes, rash, and fever.         Family History   Problem Relation Age of Onset    Stroke Mother     No Known Problems Father     Stroke Maternal Grandmother       Social History     Socioeconomic History    Marital status:    Tobacco Use    Smoking status: Never    Smokeless tobacco: Never   Substance and Sexual Activity    Alcohol use: No    Drug use: No     Social Determinants of Health     Financial Resource Strain: Low Risk  (9/13/2022)    Overall Financial Resource Strain (CARDIA)     Difficulty of Paying Living Expenses: Not hard at all   Food Insecurity: No Food Insecurity (1/24/2023)    Hunger Vital Sign     Worried About Running Out of Food in the Last Year: Never true     Ran Out of Food in the Last Year: Never true   Transportation Needs: No Transportation Needs (1/24/2023)    PRAPARE - Transportation     Lack of Transportation (Medical): No     Lack of Transportation (Non-Medical): No   Physical Activity: Inactive (1/24/2023)    Exercise Vital Sign     Days of Exercise per Week: 0 days     Minutes of Exercise per Session: 0 min   Stress: Stress Concern Present (1/24/2023)    [place] [employer] - Occupational Stress Questionnaire     Feeling of Stress : To some extent   Social Connections: Unknown (1/24/2023)    Social Connection and Isolation Panel [NHANES]     Frequency of Communication with Friends and Family: Three times a week     Frequency of Social  Gatherings with Friends and Family: Never     Active Member of Clubs or Organizations: No     Attends Club or Organization Meetings: Never     Marital Status:    Housing Stability: Low Risk  (2023)    Housing Stability Vital Sign     Unable to Pay for Housing in the Last Year: No     Number of Places Lived in the Last Year: 1     Unstable Housing in the Last Year: No     Current Outpatient Medications   Medication Sig Dispense Refill    enalapril (VASOTEC) 10 MG tablet Take 1 tablet (10 mg total) by mouth once daily. 90 tablet 0    tamoxifen (NOLVADEX) 20 MG Tab Take 1 tablet (20 mg total) by mouth once daily. 90 tablet 0    azelastine (ASTELIN) 137 mcg (0.1 %) nasal spray Spray 1 spray (137 mcg total) by Nasal route 2 (two) times daily. 30 mL 0    chlorhexidine (PERIDEX) 0.12 % solution rinse and spit with one ounce every night at bedtime (Patient not taking: Reported on 2022) 473 mL 0    mometasone 0.1% (ELOCON) 0.1 % cream Apply topically to affected area(s) once daily for 10 days 45 g 3    nystatin (MYCOSTATIN) cream Apply topically 2 (two) times daily. (Patient not taking: Reported on 2023) 15 g 1    nystatin-triamcinolone (MYCOLOG) ointment Apply topically 2 (two) times daily for 14 days 30 g 2     No current facility-administered medications for this visit.     Review of patient's allergies indicates:   Allergen Reactions    Bactrim [sulfamethoxazole-trimethoprim] Hives and Itching    Cherries     Tetracyclines       Past Medical History:   Diagnosis Date    Cancer     breast    Ductal carcinoma in situ (DCIS) of left breast 2017    Hypertension     Mass of left axilla     Use of tamoxifen (Nolvadex) 2017     Past Surgical History:   Procedure Laterality Date    BREAST SURGERY Left 2017    Lumpectomy     SECTION      MYOMECTOMY         Review of Systems   Constitutional:  Negative for activity change, appetite change, chills, diaphoresis, fatigue, fever and  "unexpected weight change.   HENT:  Negative for congestion, ear pain, sinus pressure, sore throat, trouble swallowing and voice change.    Eyes:  Negative for pain, discharge and visual disturbance.   Respiratory:  Negative for cough, chest tightness, shortness of breath and wheezing.    Cardiovascular:  Negative for chest pain and palpitations.   Gastrointestinal:  Negative for abdominal pain, constipation, diarrhea, nausea and vomiting.   Genitourinary:  Negative for difficulty urinating, flank pain, frequency and urgency.   Musculoskeletal:  Positive for arthralgias and gait problem. Negative for back pain, joint swelling and neck pain.        Douglas shin pain   Skin:  Negative for color change and rash.   Neurological:  Negative for dizziness, seizures, syncope, weakness, numbness and headaches.   Hematological:  Negative for adenopathy.   Psychiatric/Behavioral:  Negative for dysphoric mood and sleep disturbance. The patient is not nervous/anxious.       OBJECTIVE:      Vitals:    07/31/23 1404   BP: 132/82   BP Location: Left arm   Patient Position: Sitting   BP Method: Medium (Manual)   Pulse: 89   Temp: 98.4 °F (36.9 °C)   TempSrc: Oral   SpO2: 97%   Weight: 79.4 kg (175 lb)   Height: 5' 3" (1.6 m)     Physical Exam  Vitals and nursing note reviewed.   Constitutional:       General: She is awake. She is not in acute distress.     Appearance: Normal appearance. She is obese. She is not ill-appearing, toxic-appearing or diaphoretic.   HENT:      Head: Normocephalic and atraumatic.      Right Ear: Ear canal and external ear normal. Tenderness present. A middle ear effusion (Serous fluid noted) is present.      Left Ear: Tympanic membrane, ear canal and external ear normal.      Nose: Nose normal.   Eyes:      General: Lids are normal. Gaze aligned appropriately.      Conjunctiva/sclera: Conjunctivae normal.      Right eye: Right conjunctiva is not injected.      Left eye: Left conjunctiva is not injected.      " Pupils: Pupils are equal, round, and reactive to light.   Cardiovascular:      Rate and Rhythm: Normal rate and regular rhythm.      Pulses: Normal pulses.           Dorsalis pedis pulses are 2+ on the right side and 2+ on the left side.        Posterior tibial pulses are 2+ on the right side and 2+ on the left side.      Heart sounds: Normal heart sounds, S1 normal and S2 normal. No murmur heard.     No friction rub. No gallop.   Pulmonary:      Effort: Pulmonary effort is normal. No respiratory distress.      Breath sounds: Normal breath sounds. No stridor. No decreased breath sounds, wheezing, rhonchi or rales.   Chest:      Chest wall: No tenderness.   Musculoskeletal:      Cervical back: Neck supple.      Right lower leg: Tenderness present. No edema.      Left lower leg: Tenderness present. No edema.        Legs:       Comments: Anterolateral shin tenderness.  No signs of trauma, skin changes, heat, swelling, or varicose veins noted.  +2 pulses.  Douglas lowe extremities are neurovascularly intact.     Lymphadenopathy:      Cervical: No cervical adenopathy.   Skin:     General: Skin is warm and dry.      Capillary Refill: Capillary refill takes less than 2 seconds.      Findings: No erythema or rash.   Neurological:      Mental Status: She is alert and oriented to person, place, and time. Mental status is at baseline.   Psychiatric:         Attention and Perception: Attention normal.         Mood and Affect: Mood normal.         Speech: Speech normal.         Behavior: Behavior normal. Behavior is cooperative.         Thought Content: Thought content normal.         Judgment: Judgment normal.        Assessment:       1. Anterior shin splints        Plan:       Anterior shin splints  Suspect her pain is caused by shin splints. Symptoms started after walking with new inserts.  Discussed removing inserts to see if symptoms improve.  Recommend ice, rest, and ibuprofen prn. Drink plenty of water to prevent cramping.   She has been wearing the same running shoes for 6-12 months.  Patient looking to get [place]. Patient needs a shoe to help with plantar fasciitis as well.  There is no swelling or calf pain to suspect DVT. No rash or trauma to suspect infectious process.  Recent labs were stable, she does not have diabetes or low potassium. Do not suspect PAD, PVD, or pathological fractures as the cause of her symptoms.     This note was created using [employer] voice recognition software that occasionally misinterprets phrases or words.     I spent a total of 47 minutes on the day of the visit.This includes face to face time and non-face to face time preparing to see the patient (eg, review of tests), obtaining and/or reviewing separately obtained history, documenting clinical information in the electronic or other health record, independently interpreting results and communicating results to the patient/family/caregiver, or care coordinator.    Follow up if symptoms worsen or fail to improve.      7/31/2023 ENOC Garvey, FNP

## 2023-08-01 ENCOUNTER — TELEPHONE (OUTPATIENT)
Dept: HEMATOLOGY/ONCOLOGY | Facility: CLINIC | Age: 63
End: 2023-08-01

## 2023-08-01 NOTE — TELEPHONE ENCOUNTER
Attempted to contact patient to remind her of labs and f/u on 8/8/23  pt did not answer.  Left vm reminding pt to get laboratory work done @[facility].

## 2023-08-28 ENCOUNTER — TELEPHONE (OUTPATIENT)
Dept: HEMATOLOGY/ONCOLOGY | Facility: CLINIC | Age: 63
End: 2023-08-28

## 2023-08-28 LAB — BCS RECOMMENDATION EXT: NORMAL

## 2023-08-28 NOTE — TELEPHONE ENCOUNTER
I spoke with pt and reminded her to have labs and mammo done prior to appt here on 9/5/23 pt states that she had the mammo done this morning and will go this week to do the labs.

## 2023-08-31 ENCOUNTER — PATIENT OUTREACH (OUTPATIENT)
Dept: ADMINISTRATIVE | Facility: HOSPITAL | Age: 63
End: 2023-08-31
Payer: COMMERCIAL

## 2023-08-31 NOTE — PROGRESS NOTES
Population Health Chart Review & Patient Outreach Details:     Reason for Outreach Encounter:     [x]  Non-Compliant Report   []  Payor Report ([employer], PHN, BCBS, MSSP, MCIP, UHC, etc.)   []  Pre-Visit Chart Review     Updates Requested / Reviewed:     []  Care Everywhere    [x]     []  External Sources ([facility], [facility], [facility], etc.)   []  Care Team Updated    Patient Outreach Method:    []  Telephone Outreach Completed   [] Successful   [] Left Voicemail   [] Unable to Contact (wrong number, no voicemail)  []  [employer] Portal Outreach Sent  []  Letter Outreach Mailed  []  Fax Sent for External Records  [x]  External Records Upload    Health Maintenance Topics Addressed and Outreach Outcomes / Actions Taken:        [x]      Breast Cancer Screening []  Mammo Scheduled      []  External Records Requested     []  Added Reminder to Complete to Upcoming Primary Care Appt Notes     []  Patient Declined     []  Patient Will Call Back to Schedule     []  Patient Will Schedule with External Provider / Order Routed if Applicable             []       Cervical Cancer Screening []  Pap Scheduled      []  External Records Requested     []  Added Reminder to Complete to Upcoming Primary Care Appt Notes     []  Patient Declined     []  Patient Will Call Back to Schedule     []  Patient Will Schedule with External Provider               []          Colorectal Cancer Screening []  Colonoscopy Case Request or Referral Placed     []  External Records Requested     []  Added Reminder to Complete to Upcoming Primary Care Appt Notes     []  Patient Declined     []  Patient Will Call Back to Schedule     []  Patient Will Schedule with External Provider     []  Fit Kit Mailed (add the SmartPhrase under additional notes)     []  Reminded Patient to Complete Home Test             []      Diabetic Eye Exam []  Eye Camera Scheduled or Optometry Referral Placed     []  External Records Requested     []  Added Reminder to Complete to  Upcoming Primary Care Appt Notes     []  Patient Declined     []  Patient Will Call Back to Schedule     []  Patient Will Schedule with External Provider             []      Blood Pressure Control []  Primary Care Follow Up Visit Scheduled     []  Remote Blood Pressure Reading Captured     []  Added Reminder to Complete to Upcoming Primary Care Appt Notes     []  Patient Declined     []  Patient Will Call Back / Patient Will Send Portal Message with Reading     []  Patient Will Call Back to Schedule Provider Visit             []       HbA1c & Other Labs []  Lab Appt Scheduled for Due Labs     []  Primary Care Follow Up Visit Scheduled      []  Reminded Patient to Complete Home Test     []  Added Reminder to Complete to Upcoming Primary Care Appt Notes     []  Patient Declined     []  Patient Will Call Back to Schedule     []  Patient Will Schedule with External Provider / Order Routed if Applicable           []    Schedule Primary Care Appt []  Primary Care Appt Scheduled     []  Patient Declined     []  Patient Will Call Back to Schedule     []  Pt Established with External Provider & Updated Care Team             []      Medication Adherence []  Primary Care Appointment Scheduled     []  Added Reminder to Upcoming Primary Care Appt Notes     []  Patient Reminded to  Prescription     []  Patient Declined, Provider Notified if Needed     []  Sent Provider Message to Review and/or Add Exclusion to Problem List             []      Osteoporosis Screening []  DXA Appointment Scheduled     []  External Records Requested     []  Added Reminder to Complete to Upcoming Primary Care Appt Notes     []  Patient Declined     []  Patient Will Call Back to Schedule     []  Patient Will Schedule with External Provider / Order Routed if Applicable     Additional Care Coordinator Notes:         Further Action Needed If Patient Returns Outreach:

## 2023-09-04 NOTE — PROGRESS NOTES
[facility] Hematology/Oncology  PROGRESS NOTE      Subjective:       Patient ID:   NAME: Catrachita Johnson : 1960     63 y.o. female    Referring Doc: Sridhar  Other Physicians: Marylin Gomez Eddington    Chief Complaint:  DCIS f/u    History of Present Illness:     Patient returns today for a regularly scheduled follow-up visit. She is here by herself.     She has been doing ok with no new issues. She denies any CP,SOB, HA's or N/V.      She saw Dr Waller on 2023 and Wendy on 2023        She has been on tamoxifen and doing well with it.      She had mammogram on 2023    discussed covid19 precautions and she had her vaccinations      ROS:   GEN: normal without any fever, night sweats or weight loss  HEENT: normal with no HA's, sore throat, stiff neck, changes in vision  CV: normal with no CP, SOB, PND, JACKSON or orthopnea  PULM: normal with no SOB, cough, hemoptysis, sputum or pleuritic pain  GI: normal with no abdominal pain, nausea, vomiting, constipation, diarrhea, melanotic stools, BRBPR, or hematemesis  : normal with no hematuria, dysuria  BREAST: normal with no mass, discharge, pain  SKIN: normal with no rash, erythema, bruising, or swelling    Allergies:  Review of patient's allergies indicates:   Allergen Reactions    Bactrim [sulfamethoxazole-trimethoprim]     Cherries     Tetracyclines        Medications:    Current Outpatient Medications:     enalapril (VASOTEC) 10 MG tablet, Take 1 tablet (10 mg total) by mouth once daily., Disp: 90 tablet, Rfl: 0    tamoxifen (NOLVADEX) 20 MG Tab, Take 1 tablet (20 mg total) by mouth once daily., Disp: 90 tablet, Rfl: 0    azelastine (ASTELIN) 137 mcg (0.1 %) nasal spray, Spray 1 spray (137 mcg total) by Nasal route 2 (two) times daily., Disp: 30 mL, Rfl: 0    chlorhexidine (PERIDEX) 0.12 % solution, rinse and spit with one ounce every night at bedtime (Patient not taking: Reported on 2022), Disp: 473 mL, Rfl: 0    mometasone 0.1% (ELOCON) 0.1  "% cream, Apply topically to affected area(s) once daily for 10 days, Disp: 45 g, Rfl: 3    nystatin (MYCOSTATIN) cream, Apply topically 2 (two) times daily. (Patient not taking: Reported on 7/31/2023), Disp: 15 g, Rfl: 1    nystatin-triamcinolone (MYCOLOG) ointment, Apply topically 2 (two) times daily for 14 days, Disp: 30 g, Rfl: 2    PMHx/PSHx Updates:  See patient's last visit with me on 1/31/2023  See H&P on 9/1/2017        Pathology:  Cancer Staging  Ductal carcinoma in situ (DCIS) of left breast  Staging form: Onc Breast AJCC V7  - Clinical: Stage 0 (Tis (DCIS), N0, M0) - Signed by Arnie Gomez Jr., MD on 9/5/2017          Objective:     Vitals:  Blood pressure (!) 144/67, pulse 75, temperature 97.7 °F (36.5 °C), resp. rate 16, height 5' 3" (1.6 m), weight 79.1 kg (174 lb 6.4 oz).    Physical Examination:   GEN: no apparent distress, comfortable; AAOx3  HEAD: atraumatic and normocephalic  EYES: no pallor, no icterus, PERRLA  ENT: OMM, no pharyngeal erythema, external ears WNL; no nasal discharge; no thrush  NECK: no masses, thyroid normal, trachea midline, no LAD/LN's, supple  CV: RRR with no murmur; normal pulse; normal S1 and S2; no pedal edema  CHEST: Normal respiratory effort; CTAB; normal breath sounds; no wheeze or crackles  ABDOM: nontender and nondistended; soft; normal bowel sounds; no rebound/guarding  MUSC/Skeletal: ROM normal; no crepitus; joints normal; no deformities or arthropathy  EXTREM: no clubbing, cyanosis, inflammation ;no swelling  SKIN: no  lesions, ulcers, petechiae or subcutaneous nodules;    : no simons  NEURO: grossly intact; motor/sensory WNL; AAOx3; no tremors  PSYCH: normal mood, affect and behavior  LYMPH: normal cervical, supraclavicular, axillary and groin LN's  Breast: left breast with no changes, no LAD, masses etc            Labs:           Lab Results   Component Value Date    WBC 6.2 08/29/2023    HGB 12.2 08/29/2023    HCT 38.4 08/29/2023    MCV 89.3 08/29/2023    PLT " 276 08/29/2023     CMP  Sodium   Date Value Ref Range Status   08/29/2023 141 135 - 146 mmol/L Final   09/08/2017 141 134 - 144 mmol/L      Potassium   Date Value Ref Range Status   08/29/2023 4.2 3.5 - 5.3 mmol/L Final     Chloride   Date Value Ref Range Status   08/29/2023 108 98 - 110 mmol/L Final   09/08/2017 105 98 - 110 mmol/L      CO2   Date Value Ref Range Status   08/29/2023 26 20 - 32 mmol/L Final     Glucose   Date Value Ref Range Status   08/29/2023 94 65 - 99 mg/dL Final     Comment:                   Fasting reference interval        09/08/2017 83 70 - 99 mg/dL      BUN   Date Value Ref Range Status   08/29/2023 11 7 - 25 mg/dL Final     Creatinine   Date Value Ref Range Status   08/29/2023 0.86 0.50 - 1.05 mg/dL Final   09/08/2017 0.79 0.60 - 1.40 mg/dL      Calcium   Date Value Ref Range Status   08/29/2023 9.3 8.6 - 10.4 mg/dL Final     Total Protein   Date Value Ref Range Status   08/29/2023 7.2 6.1 - 8.1 g/dL Final     Albumin   Date Value Ref Range Status   08/29/2023 4.3 3.6 - 5.1 g/dL Final   09/08/2017 4.1 3.1 - 4.7 g/dL      Total Bilirubin   Date Value Ref Range Status   08/29/2023 0.3 0.2 - 1.2 mg/dL Final     Alkaline Phosphatase   Date Value Ref Range Status   07/30/2020 62 37 - 153 U/L Final     AST   Date Value Ref Range Status   08/29/2023 16 10 - 35 U/L Final     ALT   Date Value Ref Range Status   08/29/2023 12 6 - 29 U/L Final     eGFR if    Date Value Ref Range Status   02/22/2022 101 > OR = 60 mL/min/1.73m2 Final     eGFR if non    Date Value Ref Range Status   02/22/2022 87 > OR = 60 mL/min/1.73m2 Final               Radiology/Diagnostic Studies:    Mammo 8/11/2021 on chart from [facility] - negative    Mammo 8/10/2020 on chart    I have reviewed all available lab results and radiology reports.    Assessment/Plan:   (1) 62 y.o. female with diagnosis of DCIS who has been referred by Dr Coulter for oncology evaluation  - followed by Dr Waldo Padilla with  GYN  - positive family history of breast cancer (2nd degree relatives)  - DCIS with no invasive component  - ER+/NM+  - previously discussed the prognostics of DCIS especially the up to 25% risk of breast cancer development in patients who do not have resective surgery  - Dr Coulter with s/p lumpectomy on 9/13/2017; required radiation afterwards to provide equivocal survival benefit when compared to patient who have complete mastectomies  - I discussed the recommendation for postoperative radiation and she completed XRT on Nov 6th 2017  -  Previously discussed antihormone therapy with tamoxifen, and also discussed the side-effect profile and the need to f/u with GYN yearly  - mammo 7/22/2019 was negative with benign findings    2/3/2021:  - mammo on 8/20/2020 was negative  - due for next one on or after 8/21/2020  - mild borderline anemia  - check iron panel and B12/folate  - continued on tamoxifen    8/3/2021:   - mammo due next week Aug 2021 and already scheduled  - continued tamoxifen  - up to date labs pending    2/1/2022:  - latest mammo on 8/11/2021 negative - next one due in Aug 2022  - needs up to date labs  - doing well on tamoxifen    8/2/2022:  - mammo due after 8/10/2022 and already scheduled  - needs up to date labs  - continue on taxomifen    1/31/2023:  - mammo 8/26/2022 negative  - continued on tamoxifen  - she plans to establish with new GYN in near future    9/5/2023:  - recent [facility]o on 8/28 at [facility] reported to be negative per primary  - labs adequate  - continued on tamoxifen through Nov/Dec 2023     (2) Irritable bowel syndrome     (3) HTN - followed by PCP    (4) recovered from bout of covid Jan 2022      1. Ductal carcinoma in situ (DCIS) of left breast        2. Use of tamoxifen (Nolvadex)              PLAN:  1. Continue tamoxifen through Nov/Dec 2023; check up to date labs every 6 months (encouraged compliance)  2. F/u with PCP, GenSurg, Rad/onc, GYN   3. Mammo due again in Aug 2024  4. RTC in  6 months  Fax note to  Jean-Pierre Waller MD, Patricia Coulter R. Eddington    Discussion:       COVID-19 Discussion:    I had long discussion with patient and any applicable family about the COVID-19 coronavirus epidemic and the recommended precautions with regard to cancer and/or hematology patients. I have re-iterated the CDC recommendations for adequate hand washing, use of hand -like products, and coughing into elbow, etc. In addition, especially for our patients who are on chemotherapy and/or our otherwise immunocompromised patients, I have recommended avoidance of crowds, including movie theaters, restaurants, churches, etc. I have recommended avoidance of any sick or symptomatic family members and/or friends. I have also recommended avoidance of any raw and unwashed food products, and general avoidance of food items that have not been prepared by themselves. The patient has been asked to call us immediately with any symptom developments, issues, questions or other general concerns.     I have explained all of the above in detail and the patient understands all of the current recommendation(s). I have answered all of their questions to the best of my ability and to their complete satisfaction.   The patient is to continue with the current management plan.            Electronically signed by Festus Walter MD                           Answers submitted by the patient for this visit:  Review of Systems Questionnaire (Submitted on 9/3/2023)  appetite change : No  unexpected weight change: No  mouth sores: No  visual disturbance: No  cough: No  shortness of breath: No  chest pain: No  abdominal pain: No  diarrhea: No  frequency: No  back pain: No  rash: No  headaches: No  adenopathy: No  nervous/ anxious: No

## 2023-09-05 ENCOUNTER — OFFICE VISIT (OUTPATIENT)
Dept: HEMATOLOGY/ONCOLOGY | Facility: CLINIC | Age: 63
End: 2023-09-05
Payer: COMMERCIAL

## 2023-09-05 VITALS
BODY MASS INDEX: 30.9 KG/M2 | SYSTOLIC BLOOD PRESSURE: 144 MMHG | TEMPERATURE: 98 F | HEART RATE: 75 BPM | HEIGHT: 63 IN | DIASTOLIC BLOOD PRESSURE: 67 MMHG | RESPIRATION RATE: 16 BRPM | WEIGHT: 174.38 LBS

## 2023-09-05 DIAGNOSIS — D05.12 DUCTAL CARCINOMA IN SITU (DCIS) OF LEFT BREAST: Primary | ICD-10-CM

## 2023-09-05 DIAGNOSIS — Z79.810 USE OF TAMOXIFEN (NOLVADEX): ICD-10-CM

## 2023-09-05 PROCEDURE — 3008F PR BODY MASS INDEX (BMI) DOCUMENTED: ICD-10-PCS | Mod: CPTII,S$GLB,, | Performed by: INTERNAL MEDICINE

## 2023-09-05 PROCEDURE — 4010F PR ACE/ARB THEARPY RXD/TAKEN: ICD-10-PCS | Mod: CPTII,S$GLB,, | Performed by: INTERNAL MEDICINE

## 2023-09-05 PROCEDURE — 4010F ACE/ARB THERAPY RXD/TAKEN: CPT | Mod: CPTII,S$GLB,, | Performed by: INTERNAL MEDICINE

## 2023-09-05 PROCEDURE — 99214 PR OFFICE/OUTPT VISIT, EST, LEVL IV, 30-39 MIN: ICD-10-PCS | Mod: S$GLB,,, | Performed by: INTERNAL MEDICINE

## 2023-09-05 PROCEDURE — 3078F PR MOST RECENT DIASTOLIC BLOOD PRESSURE < 80 MM HG: ICD-10-PCS | Mod: CPTII,S$GLB,, | Performed by: INTERNAL MEDICINE

## 2023-09-05 PROCEDURE — 1159F PR MEDICATION LIST DOCUMENTED IN MEDICAL RECORD: ICD-10-PCS | Mod: CPTII,S$GLB,, | Performed by: INTERNAL MEDICINE

## 2023-09-05 PROCEDURE — 3077F SYST BP >= 140 MM HG: CPT | Mod: CPTII,S$GLB,, | Performed by: INTERNAL MEDICINE

## 2023-09-05 PROCEDURE — 99214 OFFICE O/P EST MOD 30 MIN: CPT | Mod: S$GLB,,, | Performed by: INTERNAL MEDICINE

## 2023-09-05 PROCEDURE — 3077F PR MOST RECENT SYSTOLIC BLOOD PRESSURE >= 140 MM HG: ICD-10-PCS | Mod: CPTII,S$GLB,, | Performed by: INTERNAL MEDICINE

## 2023-09-05 PROCEDURE — 1159F MED LIST DOCD IN RCRD: CPT | Mod: CPTII,S$GLB,, | Performed by: INTERNAL MEDICINE

## 2023-09-05 PROCEDURE — 1160F RVW MEDS BY RX/DR IN RCRD: CPT | Mod: CPTII,S$GLB,, | Performed by: INTERNAL MEDICINE

## 2023-09-05 PROCEDURE — 1160F PR REVIEW ALL MEDS BY PRESCRIBER/CLIN PHARMACIST DOCUMENTED: ICD-10-PCS | Mod: CPTII,S$GLB,, | Performed by: INTERNAL MEDICINE

## 2023-09-05 PROCEDURE — 3078F DIAST BP <80 MM HG: CPT | Mod: CPTII,S$GLB,, | Performed by: INTERNAL MEDICINE

## 2023-09-05 PROCEDURE — 3008F BODY MASS INDEX DOCD: CPT | Mod: CPTII,S$GLB,, | Performed by: INTERNAL MEDICINE

## 2023-09-09 DIAGNOSIS — I10 HYPERTENSION, UNSPECIFIED TYPE: ICD-10-CM

## 2023-09-11 RX ORDER — ENALAPRIL MALEATE 10 MG/1
10 TABLET ORAL DAILY
Qty: 90 TABLET | Refills: 0 | Status: SHIPPED | OUTPATIENT
Start: 2023-09-11 | End: 2023-12-08 | Stop reason: SDUPTHER

## 2023-09-20 DIAGNOSIS — D05.12 DUCTAL CARCINOMA IN SITU (DCIS) OF LEFT BREAST: ICD-10-CM

## 2023-09-20 RX ORDER — TAMOXIFEN CITRATE 20 MG/1
20 TABLET ORAL DAILY
Qty: 90 TABLET | Refills: 0 | Status: SHIPPED | OUTPATIENT
Start: 2023-09-20 | End: 2024-03-15

## 2023-12-08 DIAGNOSIS — I10 HYPERTENSION, UNSPECIFIED TYPE: ICD-10-CM

## 2023-12-08 RX ORDER — ENALAPRIL MALEATE 10 MG/1
10 TABLET ORAL DAILY
Qty: 90 TABLET | Refills: 0 | Status: SHIPPED | OUTPATIENT
Start: 2023-12-08 | End: 2024-03-14 | Stop reason: SDUPTHER

## 2024-01-20 ENCOUNTER — HOSPITAL ENCOUNTER (EMERGENCY)
Facility: HOSPITAL | Age: 64
Discharge: HOME | End: 2024-01-20
Attending: EMERGENCY MEDICINE
Payer: COMMERCIAL

## 2024-01-20 VITALS
OXYGEN SATURATION: 98 % | TEMPERATURE: 98 F | HEART RATE: 81 BPM | RESPIRATION RATE: 16 BRPM | HEIGHT: 63 IN | BODY MASS INDEX: 31.01 KG/M2 | SYSTOLIC BLOOD PRESSURE: 157 MMHG | DIASTOLIC BLOOD PRESSURE: 89 MMHG | WEIGHT: 175 LBS

## 2024-01-20 DIAGNOSIS — K04.7 DENTAL ABSCESS: Primary | ICD-10-CM

## 2024-01-20 PROCEDURE — 99283 EMERGENCY DEPT VISIT LOW MDM: CPT

## 2024-01-20 RX ORDER — PENICILLIN V POTASSIUM 500 MG/1
500 TABLET, FILM COATED ORAL 4 TIMES DAILY
Qty: 40 TABLET | Refills: 0 | Status: SHIPPED | OUTPATIENT
Start: 2024-01-20 | End: 2024-01-30

## 2024-01-20 NOTE — ED PROVIDER NOTES
Encounter Date: 2024       History     Chief Complaint   Patient presents with    Jaw Pain     RT SIDED, X 2MOS     Presents with complaint of right lower drop pain.  Her pain is not actually in her jaw anterior below the mandible on the right side.  On and off for 2 months.  Denies fever nausea vomiting or diarrhea.      Review of patient's allergies indicates:   Allergen Reactions    Bactrim [sulfamethoxazole-trimethoprim] Hives and Itching    Cherries     Tetracyclines      Past Medical History:   Diagnosis Date    Cancer     breast    Ductal carcinoma in situ (DCIS) of left breast 2017    Hypertension     Mass of left axilla     Use of tamoxifen (Nolvadex) 2017     Past Surgical History:   Procedure Laterality Date    BREAST SURGERY Left 2017    Lumpectomy     SECTION      MYOMECTOMY       Family History   Problem Relation Age of Onset    Stroke Mother     No Known Problems Father     Stroke Maternal Grandmother      Social History     Tobacco Use    Smoking status: Never    Smokeless tobacco: Never   Substance Use Topics    Alcohol use: No    Drug use: No     Review of Systems   Constitutional:  Negative for fever.   HENT:  Positive for dental problem.    Respiratory:  Negative for cough, shortness of breath and wheezing.    Cardiovascular:  Negative for chest pain, palpitations and leg swelling.   Gastrointestinal:  Negative for abdominal pain, diarrhea, nausea and vomiting.   Musculoskeletal:  Negative for back pain.   Skin:  Negative for rash.   Neurological:  Negative for dizziness, weakness and headaches.       Physical Exam     Initial Vitals [24 1113]   BP Pulse Resp Temp SpO2   (!) 157/89 81 16 98 °F (36.7 °C) 98 %      MAP       --         Physical Exam    Constitutional: She appears well-developed and well-nourished.   HENT:   Head: Normocephalic.   Mouth/Throat: Oropharynx is clear and moist.   There is gum swelling to the 2nd to lower jaw.  This appears to be a  dental abscess.  Negative for Ludwigs sign.    Eyes: Conjunctivae are normal.   Neck: Neck supple.   Normal range of motion.  Cardiovascular:  Normal rate, regular rhythm and normal heart sounds.           Pulmonary/Chest: Breath sounds normal. No respiratory distress. She has no wheezes. She has no rhonchi.   Musculoskeletal:         General: Normal range of motion.      Cervical back: Normal range of motion and neck supple.      Comments: Patient is ambulatory per self.     Neurological: She is alert and oriented to person, place, and time. No sensory deficit. GCS score is 15. GCS eye subscore is 4. GCS verbal subscore is 5. GCS motor subscore is 6.   Skin: Skin is warm and dry. Capillary refill takes less than 2 seconds.   Psychiatric: She has a normal mood and affect. Thought content normal.         ED Course   Procedures  Labs Reviewed - No data to display       Imaging Results    None          Medications - No data to display  Medical Decision Making  Presents with complaint of right low jaw pain.  Onset on and off for 2 months.  Patient denies fever nausea vomiting or diarrhea.    Amount and/or Complexity of Data Reviewed  Discussion of management or test interpretation with external provider(s): Patient rated her pain here at a 4/10.  She had taken ibuprofen prior to arrival.  Patient was given pen VK for her dental abscess.  She was instructed as to how to take this 4 times a day.  She was also instructed to follow up with a dentist.  At no time while in the ED did she appear to be in any acute distress.  She was given return precautions.    Risk  Prescription drug management.                                      Clinical Impression:  Final diagnoses:  [K04.7] Dental abscess (Primary)          ED Disposition Condition    Discharge Stable          ED Prescriptions       Medication Sig Dispense Start Date End Date Auth. Provider    penicillin v potassium (VEETID) 500 MG tablet Take 1 tablet (500 mg total) by  mouth 4 (four) times daily. for 10 days 40 tablet 1/20/2024 1/30/2024 Mirella Emery NP          Follow-up Information       Follow up With Specialties Details Why Contact Info    Jean-Pierre Waller MD Family Medicine In 3 days  [address]  [address]  [place] [address]  [phone]               Mirella Emery NP  01/20/24 [id number]

## 2024-01-21 ENCOUNTER — PATIENT MESSAGE (OUTPATIENT)
Dept: INTERNAL MEDICINE | Facility: CLINIC | Age: 64
End: 2024-01-21
Payer: COMMERCIAL

## 2024-02-09 ENCOUNTER — PATIENT MESSAGE (OUTPATIENT)
Dept: INTERNAL MEDICINE | Facility: CLINIC | Age: 64
End: 2024-02-09
Payer: COMMERCIAL

## 2024-02-28 ENCOUNTER — TELEPHONE (OUTPATIENT)
Dept: HEMATOLOGY/ONCOLOGY | Facility: CLINIC | Age: 64
End: 2024-02-28

## 2024-02-28 DIAGNOSIS — D05.12 DUCTAL CARCINOMA IN SITU (DCIS) OF LEFT BREAST: Primary | ICD-10-CM

## 2024-03-01 LAB
ALBUMIN SERPL-MCNC: 4.3 G/DL (ref 3.6–5.1)
ALBUMIN/GLOB SERPL: 1.5 (CALC) (ref 1–2.5)
ALP SERPL-CCNC: 87 U/L (ref 37–153)
ALT SERPL-CCNC: 19 U/L (ref 6–29)
AST SERPL-CCNC: 19 U/L (ref 10–35)
BASOPHILS # BLD AUTO: 32 CELLS/UL (ref 0–200)
BASOPHILS NFR BLD AUTO: 0.7 %
BILIRUB SERPL-MCNC: 0.4 MG/DL (ref 0.2–1.2)
BUN SERPL-MCNC: 12 MG/DL (ref 7–25)
BUN/CREAT SERPL: NORMAL (CALC) (ref 6–22)
CALCIUM SERPL-MCNC: 9.5 MG/DL (ref 8.6–10.4)
CHLORIDE SERPL-SCNC: 105 MMOL/L (ref 98–110)
CO2 SERPL-SCNC: 27 MMOL/L (ref 20–32)
CREAT SERPL-MCNC: 0.8 MG/DL (ref 0.5–1.05)
EGFR: 82 ML/MIN/1.73M2
EOSINOPHIL # BLD AUTO: 78 CELLS/UL (ref 15–500)
EOSINOPHIL NFR BLD AUTO: 1.7 %
ERYTHROCYTE [DISTWIDTH] IN BLOOD BY AUTOMATED COUNT: 13.4 % (ref 11–15)
FERRITIN SERPL-MCNC: 76 NG/ML (ref 16–288)
FOLATE SERPL-MCNC: 14.9 NG/ML
GLOBULIN SER CALC-MCNC: 2.9 G/DL (CALC) (ref 1.9–3.7)
GLUCOSE SERPL-MCNC: 94 MG/DL (ref 65–99)
HCT VFR BLD AUTO: 35.9 % (ref 35–45)
HGB BLD-MCNC: 11.6 G/DL (ref 11.7–15.5)
IRON SATN MFR SERPL: 23 % (CALC) (ref 16–45)
IRON SERPL-MCNC: 74 MCG/DL (ref 45–160)
LYMPHOCYTES # BLD AUTO: 1546 CELLS/UL (ref 850–3900)
LYMPHOCYTES NFR BLD AUTO: 33.6 %
MCH RBC QN AUTO: 28.4 PG (ref 27–33)
MCHC RBC AUTO-ENTMCNC: 32.3 G/DL (ref 32–36)
MCV RBC AUTO: 87.8 FL (ref 80–100)
MONOCYTES # BLD AUTO: 340 CELLS/UL (ref 200–950)
MONOCYTES NFR BLD AUTO: 7.4 %
NEUTROPHILS # BLD AUTO: 2604 CELLS/UL (ref 1500–7800)
NEUTROPHILS NFR BLD AUTO: 56.6 %
PLATELET # BLD AUTO: 269 THOUSAND/UL (ref 140–400)
PMV BLD REES-ECKER: 9.5 FL (ref 7.5–12.5)
POTASSIUM SERPL-SCNC: 4.1 MMOL/L (ref 3.5–5.3)
PROT SERPL-MCNC: 7.2 G/DL (ref 6.1–8.1)
RBC # BLD AUTO: 4.09 MILLION/UL (ref 3.8–5.1)
SODIUM SERPL-SCNC: 141 MMOL/L (ref 135–146)
TIBC SERPL-MCNC: 328 MCG/DL (CALC) (ref 250–450)
VIT B12 SERPL-MCNC: 436 PG/ML (ref 200–1100)
WBC # BLD AUTO: 4.6 THOUSAND/UL (ref 3.8–10.8)

## 2024-03-04 NOTE — PROGRESS NOTES
[facility] Hematology/Oncology  PROGRESS NOTE      Subjective:       Patient ID:   NAME: Catrachita Johnson : 1960     63 y.o. female    Referring Doc: Sridhar  Other Physicians: Marylin Gomez Eddington    Chief Complaint:  DCIS f/u    History of Present Illness:     Patient returns today for a regularly scheduled follow-up visit. She is here by herself.     She had recent dental issues and had to have a root canal.     She has been doing ok with no new issues. She denies any CP,SOB, HA's or N/V.      She last saw Dr Waller on 2023         She has been off the tamoxifen since Oct 2023     She had mammogram on 2023    discussed covid19 precautions and she had her vaccinations      ROS:   GEN: normal without any fever, night sweats or weight loss  HEENT: normal with no HA's, sore throat, stiff neck, changes in vision  CV: normal with no CP, SOB, PND, JACKSON or orthopnea  PULM: normal with no SOB, cough, hemoptysis, sputum or pleuritic pain  GI: normal with no abdominal pain, nausea, vomiting, constipation, diarrhea, melanotic stools, BRBPR, or hematemesis  : normal with no hematuria, dysuria  BREAST: normal with no mass, discharge, pain  SKIN: normal with no rash, erythema, bruising, or swelling    Allergies:  Review of patient's allergies indicates:   Allergen Reactions    Bactrim [sulfamethoxazole-trimethoprim]     Cherries     Tetracyclines        Medications:    Current Outpatient Medications:     enalapril (VASOTEC) 10 MG tablet, Take 1 tablet (10 mg total) by mouth once daily., Disp: 90 tablet, Rfl: 0    ibuprofen (ADVIL,MOTRIN) 800 MG tablet, Take 1 tablet (800 mg total) by mouth every 6 to 8 hours as needed., Disp: 30 tablet, Rfl: 0    azelastine (ASTELIN) 137 mcg (0.1 %) nasal spray, Spray 1 spray (137 mcg total) by Nasal route 2 (two) times daily., Disp: 30 mL, Rfl: 0    chlorhexidine (PERIDEX) 0.12 % solution, rinse and spit with one ounce every night at bedtime (Patient not taking: Reported  "on 9/13/2022), Disp: 473 mL, Rfl: 0    HYDROcodone-acetaminophen (NORCO) 5-325 mg per tablet, Take 1 tablet by mouth every 4 to 6 hours as needed for pain., Disp: 18 tablet, Rfl: 0    mometasone 0.1% (ELOCON) 0.1 % cream, Apply topically to affected area(s) once daily for 10 days, Disp: 45 g, Rfl: 3    nystatin (MYCOSTATIN) cream, Apply topically 2 (two) times daily. (Patient not taking: Reported on 7/31/2023), Disp: 15 g, Rfl: 1    nystatin-triamcinolone (MYCOLOG) ointment, Apply topically 2 (two) times daily for 14 days, Disp: 30 g, Rfl: 2    tamoxifen (NOLVADEX) 20 MG Tab, Take 1 tablet (20 mg total) by mouth once daily., Disp: 90 tablet, Rfl: 0    PMHx/PSHx Updates:  See patient's last visit with me on 9/5/2023  See H&P on 9/1/2017        Pathology:  Cancer Staging  Ductal carcinoma in situ (DCIS) of left breast  Staging form: Onc Breast AJCC V7  - Clinical: Stage 0 (Tis (DCIS), N0, M0) - Signed by Arnie Gomez Jr., MD on 9/5/2017          Objective:     Vitals:  Blood pressure (!) 158/74, pulse 70, temperature 97.2 °F (36.2 °C), resp. rate 18, height 5' 3" (1.6 m), weight 79.8 kg (176 lb).    Physical Examination:   GEN: no apparent distress, comfortable; AAOx3  HEAD: atraumatic and normocephalic  EYES: no pallor, no icterus, PERRLA  ENT: OMM, no pharyngeal erythema, external ears WNL; no nasal discharge; no thrush  NECK: no masses, thyroid normal, trachea midline, no LAD/LN's, supple  CV: RRR with no murmur; normal pulse; normal S1 and S2; no pedal edema  CHEST: Normal respiratory effort; CTAB; normal breath sounds; no wheeze or crackles  ABDOM: nontender and nondistended; soft; normal bowel sounds; no rebound/guarding  MUSC/Skeletal: ROM normal; no crepitus; joints normal; no deformities or arthropathy  EXTREM: no clubbing, cyanosis, inflammation ;no swelling  SKIN: no  lesions, ulcers, petechiae or subcutaneous nodules;    : no simons  NEURO: grossly intact; motor/sensory WNL; AAOx3; no tremors  PSYCH: " normal mood, affect and behavior  LYMPH: normal cervical, supraclavicular, axillary and groin LN's  Breast: left breast with no changes, no LAD, masses etc            Labs:           Lab Results   Component Value Date    WBC 4.6 02/29/2024    HGB 11.6 (L) 02/29/2024    HCT 35.9 02/29/2024    MCV 87.8 02/29/2024     02/29/2024     CMP  Sodium   Date Value Ref Range Status   02/29/2024 141 135 - 146 mmol/L Final   09/08/2017 141 134 - 144 mmol/L      Potassium   Date Value Ref Range Status   02/29/2024 4.1 3.5 - 5.3 mmol/L Final     Chloride   Date Value Ref Range Status   02/29/2024 105 98 - 110 mmol/L Final   09/08/2017 105 98 - 110 mmol/L      CO2   Date Value Ref Range Status   02/29/2024 27 20 - 32 mmol/L Final     Glucose   Date Value Ref Range Status   02/29/2024 94 65 - 99 mg/dL Final     Comment:                   Fasting reference interval        09/08/2017 83 70 - 99 mg/dL      BUN   Date Value Ref Range Status   02/29/2024 12 7 - 25 mg/dL Final     Creatinine   Date Value Ref Range Status   02/29/2024 0.80 0.50 - 1.05 mg/dL Final   09/08/2017 0.79 0.60 - 1.40 mg/dL      Calcium   Date Value Ref Range Status   02/29/2024 9.5 8.6 - 10.4 mg/dL Final     Total Protein   Date Value Ref Range Status   02/29/2024 7.2 6.1 - 8.1 g/dL Final     Albumin   Date Value Ref Range Status   02/29/2024 4.3 3.6 - 5.1 g/dL Final   09/08/2017 4.1 3.1 - 4.7 g/dL      Total Bilirubin   Date Value Ref Range Status   02/29/2024 0.4 0.2 - 1.2 mg/dL Final     Alkaline Phosphatase   Date Value Ref Range Status   07/30/2020 62 37 - 153 U/L Final     AST   Date Value Ref Range Status   02/29/2024 19 10 - 35 U/L Final     ALT   Date Value Ref Range Status   02/29/2024 19 6 - 29 U/L Final     eGFR if    Date Value Ref Range Status   02/22/2022 101 > OR = 60 mL/min/1.73m2 Final     eGFR if non    Date Value Ref Range Status   02/22/2022 87 > OR = 60 mL/min/1.73m2 Final                Radiology/Diagnostic Studies:    Mammo 8/28/2023  - on chart      Mammo 8/11/2021 on chart from [facility] - negative    Mammo 8/10/2020 on chart    I have reviewed all available lab results and radiology reports.    Assessment/Plan:   (1) 62 y.o. female with diagnosis of DCIS who has been referred by Dr Coulter for oncology evaluation  - followed by Dr Waldo Padilla with GYN  - positive family history of breast cancer (2nd degree relatives)  - DCIS with no invasive component  - ER+/[place]+  - previously discussed the prognostics of DCIS especially the up to 25% risk of breast cancer development in patients who do not have resective surgery  - Dr Coulter with s/p lumpectomy on 9/13/2017; required radiation afterwards to provide equivocal survival benefit when compared to patient who have complete mastectomies  - I discussed the recommendation for postoperative radiation and she completed XRT on Nov 6th 2017  -  Previously discussed antihormone therapy with tamoxifen, and also discussed the side-effect profile and the need to f/u with GYN yearly  - mammo 7/22/2019 was negative with benign findings    2/3/2021:  - mammo on 8/20/2020 was negative  - due for next one on or after 8/21/2020  - mild borderline anemia  - check iron panel and B12/folate  - continued on tamoxifen    8/3/2021:   - mammo due next week Aug 2021 and already scheduled  - continued tamoxifen  - up to date labs pending    2/1/2022:  - latest mammo on 8/11/2021 negative - next one due in Aug 2022  - needs up to date labs  - doing well on tamoxifen    8/2/2022:  - mammo due after 8/10/2022 and already scheduled  - needs up to date labs  - continue on taxomifen    1/31/2023:  - mammo 8/26/2022 negative  - continued on tamoxifen  - she plans to establish with new GYN in near future    9/5/2023:  - recent mammo on 8/28 at [facility] reported to be negative per primary  - labs adequate  - continued on tamoxifen through Nov/Dec 2023    3/5/2024:  - she is off  tamoxifen now since Oct 2024  - mammo in Aug 2023 negative     (2) Irritable bowel syndrome     (3) HTN - followed by PCP    (4) recovered from bout of covid Jan 2022      1. Ductal carcinoma in situ (DCIS) of left breast  Mammo Digital Diagnostic Bilat            PLAN:  1. Off tamoxifen since Oct 2023  2. F/u with PCP, GenSurg, Rad/onc, GYN   3. Mammo due again in Aug 2024  4. RTC in 6 months  Fax note to  Jean-Pierre Waller MD, Patricia Coulter R. Eddington    Discussion:       COVID-19 Discussion:    I had long discussion with patient and any applicable family about the COVID-19 coronavirus epidemic and the recommended precautions with regard to cancer and/or hematology patients. I have re-iterated the CDC recommendations for adequate hand washing, use of hand -like products, and coughing into elbow, etc. In addition, especially for our patients who are on chemotherapy and/or our otherwise immunocompromised patients, I have recommended avoidance of crowds, including movie theaters, restaurants, churches, etc. I have recommended avoidance of any sick or symptomatic family members and/or friends. I have also recommended avoidance of any raw and unwashed food products, and general avoidance of food items that have not been prepared by themselves. The patient has been asked to call us immediately with any symptom developments, issues, questions or other general concerns.     I have explained all of the above in detail and the patient understands all of the current recommendation(s). I have answered all of their questions to the best of my ability and to their complete satisfaction.   The patient is to continue with the current management plan.            Electronically signed by Festus Walter MD                     Answers submitted by the patient for this visit:  Review of Systems Questionnaire (Submitted on 3/4/2024)  appetite change : No  unexpected weight change: No  mouth sores: No  visual  disturbance: No  cough: No  shortness of breath: No  chest pain: No  abdominal pain: No  diarrhea: No  frequency: No  back pain: No  rash: No  headaches: No  adenopathy: No  nervous/ anxious: No

## 2024-03-05 ENCOUNTER — OFFICE VISIT (OUTPATIENT)
Dept: HEMATOLOGY/ONCOLOGY | Facility: CLINIC | Age: 64
End: 2024-03-05
Payer: COMMERCIAL

## 2024-03-05 VITALS
BODY MASS INDEX: 31.18 KG/M2 | WEIGHT: 176 LBS | DIASTOLIC BLOOD PRESSURE: 74 MMHG | TEMPERATURE: 97 F | HEIGHT: 63 IN | HEART RATE: 70 BPM | SYSTOLIC BLOOD PRESSURE: 158 MMHG | RESPIRATION RATE: 18 BRPM

## 2024-03-05 DIAGNOSIS — D05.12 DUCTAL CARCINOMA IN SITU (DCIS) OF LEFT BREAST: Primary | ICD-10-CM

## 2024-03-05 PROBLEM — Z79.810 USE OF TAMOXIFEN (NOLVADEX): Status: RESOLVED | Noted: 2017-12-28 | Resolved: 2024-03-05

## 2024-03-05 PROCEDURE — 1160F RVW MEDS BY RX/DR IN RCRD: CPT | Mod: CPTII,S$GLB,, | Performed by: INTERNAL MEDICINE

## 2024-03-05 PROCEDURE — 3078F DIAST BP <80 MM HG: CPT | Mod: CPTII,S$GLB,, | Performed by: INTERNAL MEDICINE

## 2024-03-05 PROCEDURE — 1159F MED LIST DOCD IN RCRD: CPT | Mod: CPTII,S$GLB,, | Performed by: INTERNAL MEDICINE

## 2024-03-05 PROCEDURE — 3077F SYST BP >= 140 MM HG: CPT | Mod: CPTII,S$GLB,, | Performed by: INTERNAL MEDICINE

## 2024-03-05 PROCEDURE — 99214 OFFICE O/P EST MOD 30 MIN: CPT | Mod: S$GLB,,, | Performed by: INTERNAL MEDICINE

## 2024-03-05 PROCEDURE — 3008F BODY MASS INDEX DOCD: CPT | Mod: CPTII,S$GLB,, | Performed by: INTERNAL MEDICINE

## 2024-03-09 DIAGNOSIS — I10 HYPERTENSION, UNSPECIFIED TYPE: ICD-10-CM

## 2024-03-11 RX ORDER — ENALAPRIL MALEATE 10 MG/1
10 TABLET ORAL DAILY
Qty: 90 TABLET | Refills: 0 | OUTPATIENT
Start: 2024-03-11

## 2024-03-12 DIAGNOSIS — I10 HYPERTENSION, UNSPECIFIED TYPE: ICD-10-CM

## 2024-03-12 RX ORDER — ENALAPRIL MALEATE 10 MG/1
10 TABLET ORAL DAILY
Qty: 90 TABLET | Refills: 0 | OUTPATIENT
Start: 2024-03-12

## 2024-03-14 DIAGNOSIS — I10 HYPERTENSION, UNSPECIFIED TYPE: ICD-10-CM

## 2024-03-14 RX ORDER — ENALAPRIL MALEATE 10 MG/1
10 TABLET ORAL DAILY
Qty: 90 TABLET | Refills: 0 | Status: SHIPPED | OUTPATIENT
Start: 2024-03-14 | End: 2024-03-15 | Stop reason: SDUPTHER

## 2024-03-15 ENCOUNTER — OFFICE VISIT (OUTPATIENT)
Dept: FAMILY MEDICINE | Facility: CLINIC | Age: 64
End: 2024-03-15
Payer: COMMERCIAL

## 2024-03-15 VITALS
DIASTOLIC BLOOD PRESSURE: 82 MMHG | HEIGHT: 63 IN | HEART RATE: 72 BPM | OXYGEN SATURATION: 99 % | BODY MASS INDEX: 31.04 KG/M2 | WEIGHT: 175.19 LBS | SYSTOLIC BLOOD PRESSURE: 138 MMHG

## 2024-03-15 DIAGNOSIS — I10 HYPERTENSION, UNSPECIFIED TYPE: ICD-10-CM

## 2024-03-15 DIAGNOSIS — Z00.00 PREVENTATIVE HEALTH CARE: ICD-10-CM

## 2024-03-15 DIAGNOSIS — E78.2 MIXED HYPERLIPIDEMIA: Primary | ICD-10-CM

## 2024-03-15 DIAGNOSIS — J30.1 SEASONAL ALLERGIC RHINITIS DUE TO POLLEN: ICD-10-CM

## 2024-03-15 PROCEDURE — 99214 OFFICE O/P EST MOD 30 MIN: CPT | Mod: S$GLB,,, | Performed by: FAMILY MEDICINE

## 2024-03-15 PROCEDURE — 3075F SYST BP GE 130 - 139MM HG: CPT | Mod: CPTII,S$GLB,, | Performed by: FAMILY MEDICINE

## 2024-03-15 PROCEDURE — 3008F BODY MASS INDEX DOCD: CPT | Mod: CPTII,S$GLB,, | Performed by: FAMILY MEDICINE

## 2024-03-15 PROCEDURE — 1159F MED LIST DOCD IN RCRD: CPT | Mod: CPTII,S$GLB,, | Performed by: FAMILY MEDICINE

## 2024-03-15 PROCEDURE — 3079F DIAST BP 80-89 MM HG: CPT | Mod: CPTII,S$GLB,, | Performed by: FAMILY MEDICINE

## 2024-03-15 PROCEDURE — 99999 PR PBB SHADOW E&M-EST. PATIENT-LVL III: CPT | Mod: PBBFAC,,, | Performed by: FAMILY MEDICINE

## 2024-03-15 PROCEDURE — 4010F ACE/ARB THERAPY RXD/TAKEN: CPT | Mod: CPTII,S$GLB,, | Performed by: FAMILY MEDICINE

## 2024-03-15 RX ORDER — FLUTICASONE PROPIONATE 50 MCG
1 SPRAY, SUSPENSION (ML) NASAL DAILY
Qty: 16 G | Refills: 11 | Status: SHIPPED | OUTPATIENT
Start: 2024-03-15

## 2024-03-15 RX ORDER — ENALAPRIL MALEATE 10 MG/1
10 TABLET ORAL DAILY
Qty: 90 TABLET | Refills: 0 | Status: SHIPPED | OUTPATIENT
Start: 2024-03-15 | End: 2024-06-09 | Stop reason: SDUPTHER

## 2024-03-15 NOTE — PROGRESS NOTES
[facility] Hematolgy/Oncology  History & Physical    Subjective:      Patient ID:   NAME: Catrachita Johnson : 1960     57 y.o. female    Referring Doc: Marylin  Other Physicians: Sridhar        Chief Complaint:   DCIS evaluation    HPI:  57 y.o. female with diagnosis of left breast DCIS who has been referred by Dr Coulter with [facility] for evaluation by medical hematology/oncology. She had breast biopsy on 17. Patient originally presented with an abnormal mammogram which she gets yearly. She does not have a palpable mass. She is here with her . She has a first cousin with history of breast cancer. She denies any CP, SOB, HA's or N/V. No weight loss.               ROS:   GEN: normal without any fever, night sweats or weight loss  HEENT: normal with no HA's, sore throat, stiff neck, changes in vision  CV: normal with no CP, SOB, PND, JACKSON or orthopnea  PULM: normal with no SOB, cough, hemoptysis, sputum or pleuritic pain  GI: normal with no abdominal pain, nausea, vomiting, constipation, diarrhea, melanotic stools, BRBPR, or hematemesis  : normal with no hematuria, dysuria  BREAST: normal with no mass, discharge, pain  SKIN: normal with no rash, erythema, bruising, or swelling       Past Medical/Surgical History:  Past Medical History:   Diagnosis Date    Cancer     breast    Ductal carcinoma in situ (DCIS) of left breast 2017    Hypertension     Mass of left axilla      Past Surgical History:   Procedure Laterality Date     SECTION      MYOMECTOMY           Allergies:  Review of patient's allergies indicates:   Allergen Reactions    Bactrim [sulfamethoxazole-trimethoprim]     Cherries     Tetracyclines        Social/Family History:  Social History     Social History    Marital status:      Spouse name: N/A    Number of children: N/A    Years of education: N/A     Occupational History    Not on file.     Social History Main Topics    Smoking status: Never Smoker    Smokeless  Pt. Requesting 90 day Pantoprazole script. Script written.      "tobacco: Never Used    Alcohol use No    Drug use: No    Sexual activity: Not on file     Other Topics Concern    Not on file     Social History Narrative    No narrative on file     Family History   Problem Relation Age of Onset    Stroke Mother     No Known Problems Father     Stroke Maternal Grandmother          Medications:    Current Outpatient Prescriptions:     enalapril (VASOTEC) 10 MG tablet, TAKE 1 TABLET BY MOUTH EVERY DAY, Disp: 30 tablet, Rfl: 5    oxybutynin (DITROPAN) 5 MG Tab, Take 1 tablet (5 mg total) by mouth 3 (three) times daily., Disp: 90 tablet, Rfl: 11      Pathology:  Left breast biopsy: 8/16/2017  LEFT BREAST, NEEDLE BIOPSY:    - FOCAL INTERMEDIATE TO HIGH NUCLEAR GRADE DUCTAL CARCINOMA IN SITU      (DCIS) WITH SOLID AND FOCAL COMEDOCARCINOMA PATTERNS.    - DIFFUSE AND FOCAL NODULAR STROMAL FIBROSIS WITH FOCAL      FIBROADENOMATOUS-LIKE CHANGE.    - NO INVASIVE CARCINOMA DETECTED.    Objective:   Vitals:  Blood pressure (!) 143/83, pulse 76, temperature 98.4 °F (36.9 °C), temperature source Oral, resp. rate 16, height 5' 3" (1.6 m), weight 74.4 kg (164 lb).    Physical Examination:   GEN: no apparent distress, comfortable; AAOx3  HEAD: atraumatic and normocephalic  EYES: no pallor, no icterus, PERRLA  ENT: OMM, no pharyngeal erythema, external ears WNL; no nasal discharge; no thrush  NECK: no masses, thyroid normal, trachea midline, no LAD/LN's, supple  CV: RRR with no murmur; normal pulse; normal S1 and S2; no pedal edema  CHEST: Normal respiratory effort; CTAB; normal breath sounds; no wheeze or crackles  ABDOM: nontender and nondistended; soft; normal bowel sounds; no rebound/guarding  MUSC/Skeletal: ROM normal; no crepitus; joints normal; no deformities or arthropathy  EXTREM: no clubbing, cyanosis, inflammation or swelling  SKIN: no rashes, lesions, ulcers, petechiae or subcutaneous nodules  : no simons  NEURO: grossly intact; motor/sensory WNL; AAOx3; no tremors  PSYCH: " normal mood, affect and behavior  LYMPH: normal cervical, supraclavicular, axillary and groin LN's  BREAST: biopsy left breast upper central portion - healed well but still a little tender    Labs:     none    Radiology/Diagnostic Studies:    mammo July 2017 ([facility])      All lab results and imaging results have been reviewed and discussed with the patient    Assessment:   (1) 57 y.o. female with diagnosis of DCIS who has been referred by Dr Coulter for oncology evaluation  - followed by Dr Waldo Paidlla with GYN  - positive family history of breast cancer (2nd degree relatives)  - DCIS with no invasive component  - ER+/[place]+  - discussed the prognostics of DCIS especially the up to 25% risk of breast cancer development in patients who do not have resective surgery  - Dr Coulter is planning lumpectomy in the near future; she will require radiation afterwards to provide equivocal survival benefit when compared to patient who have complete mastectomies  - I discussed the recommendation for postoperative/radiation antihormone therapy with tamoxifen    (2) Irritable bowel syndrome    (3) HTN              Plan:               PLAN:  1. Proceed with lumpectomy plans followed by radiation  2. Will eventually require antihormone therapy with tamoxifen after XRT is complete  3. Refer to rad/onc  4. RTC 3-4 weeks  4.  Fax note to Jean-Pierre Waller MD, Patricia Coulter        I have explained and the patient understands all of  the current recommendation(s). I have answered all of their questions to the best of my ability and to their complete satisfaction.             Thank you for allowing me to participate in this patient's care. Please call with any questions or concerns.    Electronically signed Festus Walter MD

## 2024-03-15 NOTE — PATIENT INSTRUCTIONS
We understand that controlling diabetes can feel overwhelming at times. We are here to offer the tools and support to help you manage your diabetes and meet your health goals. Please reference the meal planning guide below.   My fitness pal debora to log in food  Diabetic Meal Planning    About this topic  Healthy eating is an important part of keeping your diabetes in control. A balanced diet along with your diabetic drugs will help you control your blood sugar. The right portions of healthy foods may help keep your sugar within your goal range. It may also help to lower or maintain your weight, manage cholesterol, the amount of fat in your blood, and blood pressure.      Image(s)    What will the results be?  Healthy eating may help you lower your blood sugar and keep it in a safe range. Keeping your blood sugar in a safe range may lower your chances for long term problems from your diabetes. You may be less likely to have damage to your kidneys, heart, eyes, or nerves.    What changes to diet are needed?  Healthy eating means:  Eating a range of foods from all food groups.  Eating the right size portions. Read the nutrition facts on each food you eat.  Eating meals and snacks at the same time each day. Do not skip a meal or snack. Skipping meals may cause your blood sugar to go too low, especially if you are on drugs for your diabetes. Skipping meals can also cause you to eat too much at the next meal.  Talk to your diabetes educator about making a personal meal plan for you. They can also help you eat the foods you like by modifying them to be healthier.    Who should use this diet?  This diet is helpful to people with high blood sugar or diabetes.    What foods are good to eat?  It is important to make a healthy meal. Eat a variety of different foods in the right portion.  Fill half of your plate with non-starchy vegetables. Non-starchy vegetables include: Broccoli, green beans, carrots, greens (maurizio, kale,  mustard, turnip), onions, tomatoes, asparagus, beets, cauliflower, celery, and cucumber. Non-starchy vegetables are high in fiber and low in carbohydrates. These will help keep you full for longer without raising your blood sugar.  Fill 1/4 of your plate with carbohydrates. Try to choose whole grain options. Whole-grain products have more fiber which will make you feel full. Carbohydrates include: Bread, rice, pasta, and starchy vegetables. Starchy vegetables include beans, potatoes, acorn squash, butternut squash, corn, and peas.  Fill 1/4 of your plate with protein. Choose low-fat cuts of meat like boneless, skinless chicken breast; pork loin; 90% lean beef; lean and skinless turkey meat; and fresh fish (not fried) such as tuna, salmon, or mackerel.  Add a low-fat or non-fat dairy product like 8 ounces (240 mL) of 1% or skim milk or 6 ounces (180 mL) of low-fat yogurt. Eat the recommended serving. Milk and yogurt have carbohydrates which raise your blood sugar.  Add a small piece of fruit or 1/2 cup (120 grams) of frozen or canned fruit. Choose canned fruits in juice or water. Fruits include apples, bananas, peaches, pears, pineapple, plums, and oranges. Eat the recommended serving. Fruit has carbohydrates which can raise your blood sugar.  Include healthy fats in your meal like olive oil, canola oil, avocado, and nuts.  Other good foods to include in your diet are:  Foods high in fiber. Adding fiber to your meals may help control your blood sugar and cholesterol levels. It may also help with weight loss and prevent belly problems. If you are younger than 50, it is recommended to get 25 to 38 grams per day. If you are older than 50, it is recommended to get 21 to 30 grams per day. Good sources of fiber include:  Nuts and seeds  Beans, peas, and other legumes  Whole-grain products  Fruits  Vegetables  Smart snacks in the right portion. Do not go too long in between meals. Ask your dietitian when you should have a  snack in between your meals. Snack on things like:  Nuts  Vegetables and low-fat dressing  Light popcorn  Low-fat cheese and crackers  1/2 sandwich    What foods should be limited or avoided?  You may need to limit the amount of some foods you eat and how often you eat them. Foods that should be limited include:  High fat or processed foods like:  Santiago  Sausage  Hot dogs  Whole-fat dairy products  Potato chips  Foods high in sodium like:  Canned soups  Soy sauce and some salad dressings  Cured meats  Salted snack foods like pretzels  Olives  Fats and oils like:  Margarine  Salad dressing  Gravy  Lard or shortening  Foods high in sugar like:  Candy  Cookies  Cake  Ice cream  Table sugar  Soda  Juice drinks  Starches that are not whole grain like:  White rice  French fries  White pasta  White bread  Sugary cereals  Baked goods, pastries, croissants  Beer, wine, and mixed drinks (alcohol). Drink alcohol in moderation (1 drink per day or less for adult women and 2 drinks per day or less for adult men). Drinking alcohol can cause fluctuations in your blood sugar and interfere with how your diabetic drugs work. Talk to your doctor about how you can safely include alcohol into your diet.    What can be done to prevent this health problem?  Some people have a higher chance of developing diabetes than others. This is a life-long problem. You can still lead a normal life. Diabetes can be managed through diet, exercise, and drugs. It is important to have support from your family and friends to help you with your goals.    When do I need to call the doctor?  Blood sugar level is above 240 mg/dL for more than a day  Blood sugar level drops to less than 40 and does not respond to 15 grams of simple carbohydrate, like 4 glucose tablets or 1/2 cup (120 mL) of fruit juice  Trouble breathing  Very sleepy and trouble concentrating  Feeling very thirsty  Needing to urinate (pee) more than normal  Throw up more than once or have many  loose stools  You are so sick you cannot eat or drink  Fever over 101°F (38°C)  Questions about your diet plan  You are not feeling better in 2 to 3 days or you are feeling worse    Helpful tips  Plan ahead. Plan your meals and grocery list before going to the store. This will help you to choose foods that are good for you.  Pack a lunch. Take healthy meals and snacks with you to work.  Keep a food journal to help keep you on track. Take note of foods that keep your blood sugar in goal range. Also note foods and meals that raise or lower your blood sugar. There are apps for your phone that can help with this.  Visit a restaurant's website before eating out. You can see the menu options and nutritional facts. This way, you can see which items best fit into your diet plan. Call ahead if you have questions.    Disclaimer.This generalized information is a limited summary of diagnosis, treatment, and/or medication information. It is not meant to be comprehensive and should be used as a tool to help the user understand and/or assess potential diagnostic and treatment options. It does NOT include all information about conditions, treatments, medications, side effects, or risks that may apply to a specific patient. It is not intended to be medical advice or a substitute for the medical advice, diagnosis, or treatment of a health care provider based on the health care provider's examination and assessment of a patient's specific and unique circumstances. Patients must speak with a health care provider for complete information about their health, medical questions, and treatment options, including any risks or benefits regarding use of medications. This information does not endorse any treatments or medications as safe, effective, or approved for treating a specific patient. [employer] and its affiliates disclaim any warranty or liability relating to this information or the use thereof. The use of this information is  governed by the Terms of Use, available at Terms of Use. ©2022 [employer] and its affiliates and/or licensors. All rights reserved. Avoid anything with sugar in the 1st 3 ingredients including honey and syrup.  Avoid dried fruits like raise nodes and apricots.  Other fruits and vegetables are okay but the sugar content is higher in bananas and grapes.  Avoid large portions on your starchy foods:  Bread rice potatoes and pasta.

## 2024-03-15 NOTE — PROGRESS NOTES
Subjective:       Patient ID: Catrachita Johnson is a 63 y.o. female.    Chief Complaint: Medication Refill      Here for follow-up on hypertension.  Patient Active Problem List:          Irritable bowel syndrome without diarrhea       Ductal carcinoma in situ (DCIS) of left breast     Acute hemorrhagic cystitis     Influenza B     Hypertension     BMI 30.0-30.9,adult     Intertrigo     Irritant contact dermatitis     Dental abscess  Wt Readings from Last 4 Encounters:  03/15/24 : 79.5 kg (175 lb 3.2 oz)  03/05/24 : 79.8 kg (176 lb)  01/20/24 : 79.4 kg (175 lb)  09/05/23 : 79.1 kg (174 lb 6.4 oz)  Lab Results       Component                Value               Date                       WBC                      4.6                 02/29/2024                 HGB                      11.6 (L)            02/29/2024                 HCT                      35.9                02/29/2024                 PLT                      269                 02/29/2024                 ALT                      19                  02/29/2024                 AST                      19                  02/29/2024                 NA                       141                 02/29/2024                 K                        4.1                 02/29/2024                 CL                       105                 02/29/2024                 CREATININE               0.80                02/29/2024                 BUN                      12                  02/29/2024                 CO2                      27                  02/29/2024                    Hypertension  This is a chronic problem. The problem is unchanged. The problem is controlled.       Allergies and Medications:   Review of patient's allergies indicates:   Allergen Reactions    Bactrim [sulfamethoxazole-trimethoprim] Hives and Itching    Cherries     Tetracyclines      Current Outpatient Medications   Medication Sig Dispense Refill    azelastine (ASTELIN) 137 mcg  (0.1 %) nasal spray Spray 1 spray (137 mcg total) by Nasal route 2 (two) times daily. 30 mL 0    chlorhexidine (PERIDEX) 0.12 % solution rinse and spit with one ounce every night at bedtime (Patient not taking: Reported on 9/13/2022) 473 mL 0    enalapril (VASOTEC) 10 MG tablet Take 1 tablet (10 mg total) by mouth once daily. 90 tablet 0    HYDROcodone-acetaminophen (NORCO) 5-325 mg per tablet Take 1 tablet by mouth every 4 to 6 hours as needed for pain. 18 tablet 0    ibuprofen (ADVIL,MOTRIN) 800 MG tablet Take 1 tablet (800 mg total) by mouth every 6 to 8 hours as needed. (Patient not taking: Reported on 3/15/2024) 30 tablet 0    mometasone 0.1% (ELOCON) 0.1 % cream Apply topically to affected area(s) once daily for 10 days (Patient not taking: Reported on 3/15/2024) 45 g 3    nystatin (MYCOSTATIN) cream Apply topically 2 (two) times daily. (Patient not taking: Reported on 7/31/2023) 15 g 1    nystatin-triamcinolone (MYCOLOG) ointment Apply topically 2 (two) times daily for 14 days (Patient not taking: Reported on 3/15/2024) 30 g 2    tamoxifen (NOLVADEX) 20 MG Tab Take 1 tablet (20 mg total) by mouth once daily. 90 tablet 0     No current facility-administered medications for this visit.       Family History:   Family History   Problem Relation Age of Onset    Stroke Mother     No Known Problems Father     Stroke Maternal Grandmother        Social History:   Social History     Socioeconomic History    Marital status:    Tobacco Use    Smoking status: Never    Smokeless tobacco: Never   Substance and Sexual Activity    Alcohol use: No    Drug use: No     Social Determinants of Health     Financial Resource Strain: Low Risk  (3/4/2024)    Overall Financial Resource Strain (CARDIA)     Difficulty of Paying Living Expenses: Not hard at all   Food Insecurity: No Food Insecurity (3/4/2024)    Hunger Vital Sign     Worried About Running Out of Food in the Last Year: Never true     Ran Out of  Food in the Last Year: Never true   Transportation Needs: No Transportation Needs (3/4/2024)    PRAPARE - Transportation     Lack of Transportation (Medical): No     Lack of Transportation (Non-Medical): No   Physical Activity: Inactive (3/4/2024)    Exercise Vital Sign     Days of Exercise per Week: 0 days     Minutes of Exercise per Session: 0 min   Stress: No Stress Concern Present (3/4/2024)    [place] [employer] - Occupational Stress Questionnaire     Feeling of Stress : Not at all   Social Connections: Unknown (3/4/2024)    Social Connection and Isolation Panel [NHANES]     Frequency of Communication with Friends and Family: More than three times a week     Frequency of Social Gatherings with Friends and Family: Once a week     Active Member of Clubs or Organizations: No     Attends Club or Organization Meetings: Never     Marital Status:    Housing Stability: Low Risk  (3/4/2024)    Housing Stability Vital Sign     Unable to Pay for Housing in the Last Year: No     Number of Places Lived in the Last Year: 0     Unstable Housing in the Last Year: No       Review of Systems    Objective:     Vitals:    03/15/24 0933   BP: 138/82   Pulse: 72        Physical Exam  Vitals and nursing note reviewed.   Constitutional:       General: She is not in acute distress.     Appearance: Normal appearance. She is well-developed and normal weight. She is not ill-appearing, toxic-appearing or diaphoretic.   HENT:      Head: Normocephalic and atraumatic.   Eyes:      Pupils: Pupils are equal, round, and reactive to light.   Cardiovascular:      Rate and Rhythm: Normal rate and regular rhythm.      Heart sounds: Normal heart sounds. No murmur heard.     No friction rub. No gallop.   Pulmonary:      Effort: Pulmonary effort is normal. No respiratory distress.      Breath sounds: Normal breath sounds. No stridor. No wheezing, rhonchi or rales.   Chest:      Chest wall: No tenderness.    Musculoskeletal:      Right lower leg: No edema.      Left lower leg: No edema.   Neurological:      Mental Status: She is alert.   Psychiatric:         Behavior: Behavior normal.         Thought Content: Thought content normal.         Judgment: Judgment normal.       Assessment:       1. Mixed hyperlipidemia    2. Hypertension, unspecified type    3. Preventative health care        Plan:       Catrachita was seen today for medication refill.    Diagnoses and all orders for this visit:    Mixed hyperlipidemia  -     Lipid Panel; Future    Hypertension, unspecified type  -     enalapril (VASOTEC) 10 MG tablet; Take 1 tablet (10 mg total) by mouth once daily.    Preventative health care  -     Hemoglobin A1C; Future  -     HIV 1/2 Ag/Ab (4th Gen); Future  -     Hepatitis C Antibody; Future         Follow up in about 6 months (around 9/15/2024) for follow up HTN, follow up DM, follow up cholesterol.

## 2024-04-04 ENCOUNTER — PATIENT OUTREACH (OUTPATIENT)
Dept: ADMINISTRATIVE | Facility: HOSPITAL | Age: 64
End: 2024-04-04
Payer: COMMERCIAL

## 2024-04-04 NOTE — PROGRESS NOTES
Population Health Chart Review & Patient Outreach Details      Additional [facility] Health Notes:               Updates Requested / Reviewed:      Updated Care Coordination Note, Care Everywhere, Care Team Updated, and Immunizations Reconciliation Completed or Queried: [facility] Topics Overdue:      [facility] Score: 1     Hemoglobin A1c                       Health Maintenance Topic(s) Outreach Outcomes & Actions Taken:    Eye Exam - Outreach Outcomes & Actions Taken  : Non-Diabetic Eye External Records Uploaded, Care Team & History Updated if Applicable

## 2024-05-07 ENCOUNTER — TELEPHONE (OUTPATIENT)
Dept: HEMATOLOGY/ONCOLOGY | Facility: CLINIC | Age: 64
End: 2024-05-07

## 2024-05-08 NOTE — TELEPHONE ENCOUNTER
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 Walter, he does not treat this, we are requesting path and last office notes from Dr. Grover. Patient asking if this is bad and if she will be ok, I informed her that this is not something that I am able to answer and she should call her dermatologist to discuss this. Patient states she was referred to specialist, per Dr Walter it sounds like she is in need of mohs procedure and she should come see this office once that is complete. Patient verbalized understanding. bizsol message sent to Dr Grover requesting both path and last office notes.

## 2024-06-09 DIAGNOSIS — I10 HYPERTENSION, UNSPECIFIED TYPE: ICD-10-CM

## 2024-06-10 RX ORDER — ENALAPRIL MALEATE 10 MG/1
10 TABLET ORAL DAILY
Qty: 90 TABLET | Refills: 1 | Status: SHIPPED | OUTPATIENT
Start: 2024-06-10

## 2024-08-01 DIAGNOSIS — D05.12 DUCTAL CARCINOMA IN SITU OF LEFT BREAST: Primary | ICD-10-CM

## 2024-08-05 ENCOUNTER — TELEPHONE (OUTPATIENT)
Facility: CLINIC | Age: 64
End: 2024-08-05
Payer: COMMERCIAL

## 2024-08-05 DIAGNOSIS — Z79.810 USE OF TAMOXIFEN (NOLVADEX): ICD-10-CM

## 2024-08-05 DIAGNOSIS — D05.12 DUCTAL CARCINOMA IN SITU (DCIS) OF LEFT BREAST: Primary | ICD-10-CM

## 2024-08-28 DIAGNOSIS — Z78.0 MENOPAUSE: ICD-10-CM

## 2024-09-04 ENCOUNTER — TELEPHONE (OUTPATIENT)
Facility: CLINIC | Age: 64
End: 2024-09-04
Payer: COMMERCIAL

## 2024-09-04 NOTE — TELEPHONE ENCOUNTER
I spoke with pt and reminded her to have labs done prior to appt on 9/10/24 pt states that she went today and did them.

## 2024-09-10 ENCOUNTER — OFFICE VISIT (OUTPATIENT)
Facility: CLINIC | Age: 64
End: 2024-09-10
Payer: COMMERCIAL

## 2024-09-10 VITALS
RESPIRATION RATE: 18 BRPM | HEART RATE: 65 BPM | DIASTOLIC BLOOD PRESSURE: 77 MMHG | WEIGHT: 177 LBS | BODY MASS INDEX: 31.35 KG/M2 | SYSTOLIC BLOOD PRESSURE: 161 MMHG | TEMPERATURE: 98 F

## 2024-09-10 DIAGNOSIS — D05.12 DUCTAL CARCINOMA IN SITU (DCIS) OF LEFT BREAST: Primary | ICD-10-CM

## 2024-09-10 PROCEDURE — 99214 OFFICE O/P EST MOD 30 MIN: CPT | Mod: S$GLB,,, | Performed by: INTERNAL MEDICINE

## 2024-09-10 PROCEDURE — 1159F MED LIST DOCD IN RCRD: CPT | Mod: CPTII,S$GLB,, | Performed by: INTERNAL MEDICINE

## 2024-09-10 PROCEDURE — 1101F PT FALLS ASSESS-DOCD LE1/YR: CPT | Mod: CPTII,S$GLB,, | Performed by: INTERNAL MEDICINE

## 2024-09-10 PROCEDURE — 1160F RVW MEDS BY RX/DR IN RCRD: CPT | Mod: CPTII,S$GLB,, | Performed by: INTERNAL MEDICINE

## 2024-09-10 PROCEDURE — 3078F DIAST BP <80 MM HG: CPT | Mod: CPTII,S$GLB,, | Performed by: INTERNAL MEDICINE

## 2024-09-10 PROCEDURE — 99999 PR PBB SHADOW E&M-EST. PATIENT-LVL III: CPT | Mod: PBBFAC,,, | Performed by: INTERNAL MEDICINE

## 2024-09-10 PROCEDURE — G2211 COMPLEX E/M VISIT ADD ON: HCPCS | Mod: S$GLB,,, | Performed by: INTERNAL MEDICINE

## 2024-09-10 PROCEDURE — 3288F FALL RISK ASSESSMENT DOCD: CPT | Mod: CPTII,S$GLB,, | Performed by: INTERNAL MEDICINE

## 2024-09-10 PROCEDURE — 3077F SYST BP >= 140 MM HG: CPT | Mod: CPTII,S$GLB,, | Performed by: INTERNAL MEDICINE

## 2024-09-10 PROCEDURE — 3008F BODY MASS INDEX DOCD: CPT | Mod: CPTII,S$GLB,, | Performed by: INTERNAL MEDICINE

## 2024-09-10 PROCEDURE — 4010F ACE/ARB THERAPY RXD/TAKEN: CPT | Mod: CPTII,S$GLB,, | Performed by: INTERNAL MEDICINE

## 2024-09-10 NOTE — PROGRESS NOTES
[facility] Hematology/Oncology  PROGRESS NOTE      Subjective:       Patient ID:   NAME: Catrachita Johnson : 1960     64 y.o. female    Referring Doc: Sridhar  Other Physicians: Marylin Gomez Eddington    Chief Complaint:  DCIS f/u    History of Present Illness:     Patient returns today for a regularly scheduled follow-up visit. She is here by herself.     She had a BCC removed from under the right eyelid with Dr Graham in [place]; she is planning to f/u with her regular dermatologist     She has been doing ok with no new issues. She denies any CP,SOB, HA's or N/V.      She last saw Dr Waller in 2024      She has been off the tamoxifen since Oct 2023     She had mammogram on 2024 witch was negative    discussed covid19 precautions and she had her vaccinations      ROS:   GEN: normal without any fever, night sweats or weight loss  HEENT: normal with no HA's, sore throat, stiff neck, changes in vision; recent BCC removed from right eyelid - healed well  CV: normal with no CP, SOB, PND, JACKSON or orthopnea  PULM: normal with no SOB, cough, hemoptysis, sputum or pleuritic pain  GI: normal with no abdominal pain, nausea, vomiting, constipation, diarrhea, melanotic stools, BRBPR, or hematemesis  : normal with no hematuria, dysuria  BREAST: normal with no mass, discharge, pain  SKIN: normal with no rash, erythema, bruising, or swelling    Allergies:  Review of patient's allergies indicates:   Allergen Reactions    Bactrim [sulfamethoxazole-trimethoprim]     Cherries     Tetracyclines        Medications:    Current Outpatient Medications:     chlorhexidine (PERIDEX) 0.12 % solution, rinse and spit with one ounce every night at bedtime, Disp: 473 mL, Rfl: 0    enalapril (VASOTEC) 10 MG tablet, Take 1 tablet (10 mg total) by mouth once daily., Disp: 90 tablet, Rfl: 1    fluticasone propionate (FLONASE) 50 mcg/actuation nasal spray, 1 spray (50 mcg total) by Each Nostril route once daily., Disp: 16 g, Rfl:  11    ibuprofen (ADVIL,MOTRIN) 800 MG tablet, Take 1 tablet (800 mg total) by mouth every 6 to 8 hours as needed., Disp: 30 tablet, Rfl: 0    mometasone 0.1% (ELOCON) 0.1 % cream, Apply topically to affected area(s) once daily for 10 days, Disp: 45 g, Rfl: 3    nystatin (MYCOSTATIN) cream, Apply topically 2 (two) times daily., Disp: 15 g, Rfl: 1    nystatin-triamcinolone (MYCOLOG) ointment, Apply topically 2 (two) times daily for 14 days, Disp: 30 g, Rfl: 2    PMHx/PSHx Updates:  See patient's last visit with me on 3/5/2024  See H&P on 9/1/2017        Pathology:  Cancer Staging  Ductal carcinoma in situ (DCIS) of left breast  Staging form: Onc Breast AJCC V7  - Clinical: Stage 0 (Tis (DCIS), N0, M0) - Signed by Arnie Gomez Jr., MD on 9/5/2017          Objective:     Vitals:  Blood pressure (!) 161/77, pulse 65, temperature 98.2 °F (36.8 °C), resp. rate 18, weight 80.3 kg (177 lb).    Physical Examination:   GEN: no apparent distress, comfortable; AAOx3  HEAD: atraumatic and normocephalic  EYES: no pallor, no icterus, PERRLA; right eyelid healed well  ENT: OMM, no pharyngeal erythema, external ears WNL; no nasal discharge; no thrush  NECK: no masses, thyroid normal, trachea midline, no LAD/LN's, supple  CV: RRR with no murmur; normal pulse; normal S1 and S2; no pedal edema  CHEST: Normal respiratory effort; CTAB; normal breath sounds; no wheeze or crackles  ABDOM: nontender and nondistended; soft; normal bowel sounds; no rebound/guarding  MUSC/Skeletal: ROM normal; no crepitus; joints normal; no deformities or arthropathy  EXTREM: no clubbing, cyanosis, inflammation ;no swelling  SKIN: no  lesions, ulcers, petechiae or subcutaneous nodules;    : no simons  NEURO: grossly intact; motor/sensory WNL; AAOx3; no tremors  PSYCH: normal mood, affect and behavior  LYMPH: normal cervical, supraclavicular, axillary and groin LN's  Breast: left breast with no changes, no LAD, masses etc            Labs:           Lab  Results   Component Value Date    WBC 5.1 09/04/2024    HGB 12.3 09/04/2024    HCT 38.7 09/04/2024    MCV 90.4 09/04/2024     09/04/2024     CMP  Sodium   Date Value Ref Range Status   09/04/2024 139 135 - 146 mmol/L Final   09/08/2017 141 134 - 144 mmol/L      Potassium   Date Value Ref Range Status   09/04/2024 4.4 3.5 - 5.3 mmol/L Final     Chloride   Date Value Ref Range Status   09/04/2024 104 98 - 110 mmol/L Final   09/08/2017 105 98 - 110 mmol/L      CO2   Date Value Ref Range Status   09/04/2024 28 20 - 32 mmol/L Final     Glucose   Date Value Ref Range Status   09/04/2024 86 65 - 99 mg/dL Final     Comment:                   Fasting reference interval        09/08/2017 83 70 - 99 mg/dL      BUN   Date Value Ref Range Status   09/04/2024 11 7 - 25 mg/dL Final     Creatinine   Date Value Ref Range Status   09/04/2024 0.81 0.50 - 1.05 mg/dL Final   09/08/2017 0.79 0.60 - 1.40 mg/dL      Calcium   Date Value Ref Range Status   09/04/2024 9.2 8.6 - 10.4 mg/dL Final     Total Protein   Date Value Ref Range Status   09/04/2024 7.1 6.1 - 8.1 g/dL Final     Albumin   Date Value Ref Range Status   09/04/2024 4.2 3.6 - 5.1 g/dL Final   09/08/2017 4.1 3.1 - 4.7 g/dL      Total Bilirubin   Date Value Ref Range Status   09/04/2024 0.4 0.2 - 1.2 mg/dL Final     Alkaline Phosphatase   Date Value Ref Range Status   07/30/2020 62 37 - 153 U/L Final     AST   Date Value Ref Range Status   09/04/2024 26 10 - 35 U/L Final     ALT   Date Value Ref Range Status   09/04/2024 23 6 - 29 U/L Final     eGFR if    Date Value Ref Range Status   02/22/2022 101 > OR = 60 mL/min/1.73m2 Final     eGFR if non    Date Value Ref Range Status   02/22/2022 87 > OR = 60 mL/min/1.73m2 Final               Radiology/Diagnostic Studies:    Mammo 8/30/2024:  - on chart in media  - negative/benign      Mammo 8/28/2023  - on chart      Mammo 8/11/2021 on chart from [facility] - negative    Mammo 8/10/2020 on chart    I  have reviewed all available lab results and radiology reports.    Assessment/Plan:   (1) 62 y.o. female with diagnosis of DCIS who has been referred by Dr Coulter for oncology evaluation  - followed by Dr Waldo Padilla with GYN  - positive family history of breast cancer (2nd degree relatives)  - DCIS with no invasive component  - ER+/DC+  - previously discussed the prognostics of DCIS especially the up to 25% risk of breast cancer development in patients who do not have resective surgery  - Dr Coulter with s/p lumpectomy on 9/13/2017; required radiation afterwards to provide equivocal survival benefit when compared to patient who have complete mastectomies  - I discussed the recommendation for postoperative radiation and she completed XRT on Nov 6th 2017  -  Previously discussed antihormone therapy with tamoxifen, and also discussed the side-effect profile and the need to f/u with GYN yearly  - mammo 7/22/2019 was negative with benign findings    2/3/2021:  - mammo on 8/20/2020 was negative  - due for next one on or after 8/21/2020  - mild borderline anemia  - check iron panel and B12/folate  - continued on tamoxifen    8/3/2021:   - mammo due next week Aug 2021 and already scheduled  - continued tamoxifen  - up to date labs pending    2/1/2022:  - latest mammo on 8/11/2021 negative - next one due in Aug 2022  - needs up to date labs  - doing well on tamoxifen    8/2/2022:  - mammo due after 8/10/2022 and already scheduled  - needs up to date labs  - continue on taxomifen    1/31/2023:  - mammo 8/26/2022 negative  - continued on tamoxifen  - she plans to establish with new GYN in near future    9/5/2023:  - recent mammo on 8/28 at [facility] reported to be negative per primary  - labs adequate  - continued on tamoxifen through Nov/Dec 2023    3/5/2024:  - she is off tamoxifen now since Oct 2024  - mammo in Aug 2023 negative    9/10/2024:  - latest mammo negative/benign on 8/30/2024  - labs are adequate     (2) Irritable  bowel syndrome     (3) HTN - followed by PCP    (4) BCC - right eyelid      1. Ductal carcinoma in situ (DCIS) of left breast              PLAN:  1. Off tamoxifen since Oct 2023  2. F/u with PCP, GenSurg, Rad/onc, GYN   3. Mammo due again in Aug 2025  4. F/u with derm as directed    RTC in 12 months  Fax note to  Jean-Pierre Waller MD, Patricia Coulter R. Eddington    Discussion:       COVID-19 Discussion:    I had long discussion with patient and any applicable family about the COVID-19 coronavirus epidemic and the recommended precautions with regard to cancer and/or hematology patients. I have re-iterated the CDC recommendations for adequate hand washing, use of hand -like products, and coughing into elbow, etc. In addition, especially for our patients who are on chemotherapy and/or our otherwise immunocompromised patients, I have recommended avoidance of crowds, including movie theaters, restaurants, churches, etc. I have recommended avoidance of any sick or symptomatic family members and/or friends. I have also recommended avoidance of any raw and unwashed food products, and general avoidance of food items that have not been prepared by themselves. The patient has been asked to call us immediately with any symptom developments, issues, questions or other general concerns.     I have explained all of the above in detail and the patient understands all of the current recommendation(s). I have answered all of their questions to the best of my ability and to their complete satisfaction.   The patient is to continue with the current management plan.            Electronically signed by Festus Walter MD                     Answers submitted by the patient for this visit:  Review of Systems Questionnaire (Submitted on 9/4/2024)  appetite change : No  unexpected weight change: No  mouth sores: No  visual disturbance: No  cough: No  shortness of breath: No  chest pain: No  abdominal pain: No  diarrhea:  No  frequency: No  back pain: No  rash: No  headaches: No  adenopathy: No  nervous/ anxious: No

## 2024-09-16 ENCOUNTER — OFFICE VISIT (OUTPATIENT)
Dept: FAMILY MEDICINE | Facility: CLINIC | Age: 64
End: 2024-09-16
Payer: COMMERCIAL

## 2024-09-16 ENCOUNTER — LAB VISIT (OUTPATIENT)
Dept: LAB | Facility: HOSPITAL | Age: 64
End: 2024-09-16
Attending: FAMILY MEDICINE
Payer: COMMERCIAL

## 2024-09-16 VITALS
RESPIRATION RATE: 18 BRPM | BODY MASS INDEX: 31.54 KG/M2 | DIASTOLIC BLOOD PRESSURE: 84 MMHG | WEIGHT: 178 LBS | OXYGEN SATURATION: 97 % | TEMPERATURE: 98 F | HEART RATE: 67 BPM | HEIGHT: 63 IN | SYSTOLIC BLOOD PRESSURE: 124 MMHG

## 2024-09-16 DIAGNOSIS — E78.2 MIXED HYPERLIPIDEMIA: ICD-10-CM

## 2024-09-16 DIAGNOSIS — Z00.00 PREVENTATIVE HEALTH CARE: ICD-10-CM

## 2024-09-16 DIAGNOSIS — E78.2 MIXED HYPERLIPIDEMIA: Primary | ICD-10-CM

## 2024-09-16 DIAGNOSIS — N20.0 KIDNEY STONES: ICD-10-CM

## 2024-09-16 DIAGNOSIS — R00.2 PALPITATIONS: ICD-10-CM

## 2024-09-16 LAB
25(OH)D3+25(OH)D2 SERPL-MCNC: 34 NG/ML (ref 30–96)
CHOLEST SERPL-MCNC: 230 MG/DL (ref 120–199)
CHOLEST/HDLC SERPL: 4.8 {RATIO} (ref 2–5)
ESTIMATED AVG GLUCOSE: 108 MG/DL (ref 68–131)
HBA1C MFR BLD: 5.4 % (ref 4–5.6)
HDLC SERPL-MCNC: 48 MG/DL (ref 40–75)
HDLC SERPL: 20.9 % (ref 20–50)
LDLC SERPL CALC-MCNC: 154.6 MG/DL (ref 63–159)
NONHDLC SERPL-MCNC: 182 MG/DL
TRIGL SERPL-MCNC: 137 MG/DL (ref 30–150)

## 2024-09-16 PROCEDURE — 1159F MED LIST DOCD IN RCRD: CPT | Mod: CPTII,S$GLB,, | Performed by: FAMILY MEDICINE

## 2024-09-16 PROCEDURE — 82306 VITAMIN D 25 HYDROXY: CPT | Performed by: FAMILY MEDICINE

## 2024-09-16 PROCEDURE — 3074F SYST BP LT 130 MM HG: CPT | Mod: CPTII,S$GLB,, | Performed by: FAMILY MEDICINE

## 2024-09-16 PROCEDURE — 99999 PR PBB SHADOW E&M-EST. PATIENT-LVL V: CPT | Mod: PBBFAC,,, | Performed by: FAMILY MEDICINE

## 2024-09-16 PROCEDURE — 99213 OFFICE O/P EST LOW 20 MIN: CPT | Mod: S$GLB,,, | Performed by: FAMILY MEDICINE

## 2024-09-16 PROCEDURE — 3008F BODY MASS INDEX DOCD: CPT | Mod: CPTII,S$GLB,, | Performed by: FAMILY MEDICINE

## 2024-09-16 PROCEDURE — 4010F ACE/ARB THERAPY RXD/TAKEN: CPT | Mod: CPTII,S$GLB,, | Performed by: FAMILY MEDICINE

## 2024-09-16 PROCEDURE — 3288F FALL RISK ASSESSMENT DOCD: CPT | Mod: CPTII,S$GLB,, | Performed by: FAMILY MEDICINE

## 2024-09-16 PROCEDURE — 3079F DIAST BP 80-89 MM HG: CPT | Mod: CPTII,S$GLB,, | Performed by: FAMILY MEDICINE

## 2024-09-16 PROCEDURE — 80061 LIPID PANEL: CPT | Performed by: FAMILY MEDICINE

## 2024-09-16 PROCEDURE — 1101F PT FALLS ASSESS-DOCD LE1/YR: CPT | Mod: CPTII,S$GLB,, | Performed by: FAMILY MEDICINE

## 2024-09-16 PROCEDURE — 83036 HEMOGLOBIN GLYCOSYLATED A1C: CPT | Performed by: FAMILY MEDICINE

## 2024-09-16 NOTE — PATIENT INSTRUCTIONS
We understand that controlling diabetes can feel overwhelming at times. We are here to offer the tools and support to help you manage your diabetes and meet your health goals. Please reference the meal planning guide below.     Diabetic Meal Planning    About this topic  Healthy eating is an important part of keeping your diabetes in control. A balanced diet along with your diabetic drugs will help you control your blood sugar. The right portions of healthy foods may help keep your sugar within your goal range. It may also help to lower or maintain your weight, manage cholesterol, the amount of fat in your blood, and blood pressure.      Image(s)    What will the results be?  Healthy eating may help you lower your blood sugar and keep it in a safe range. Keeping your blood sugar in a safe range may lower your chances for long term problems from your diabetes. You may be less likely to have damage to your kidneys, heart, eyes, or nerves.    What changes to diet are needed?  Healthy eating means:  Eating a range of foods from all food groups.  Eating the right size portions. Read the nutrition facts on each food you eat.  Eating meals and snacks at the same time each day. Do not skip a meal or snack. Skipping meals may cause your blood sugar to go too low, especially if you are on drugs for your diabetes. Skipping meals can also cause you to eat too much at the next meal.  Talk to your diabetes educator about making a personal meal plan for you. They can also help you eat the foods you like by modifying them to be healthier.    Who should use this diet?  This diet is helpful to people with high blood sugar or diabetes.    What foods are good to eat?  It is important to make a healthy meal. Eat a variety of different foods in the right portion.  Fill half of your plate with non-starchy vegetables. Non-starchy vegetables include: Broccoli, green beans, carrots, greens (maurizio, kale, mustard, turnip), onions, tomatoes,  asparagus, beets, cauliflower, celery, and cucumber. Non-starchy vegetables are high in fiber and low in carbohydrates. These will help keep you full for longer without raising your blood sugar.  Fill 1/4 of your plate with carbohydrates. Try to choose whole grain options. Whole-grain products have more fiber which will make you feel full. Carbohydrates include: Bread, rice, pasta, and starchy vegetables. Starchy vegetables include beans, potatoes, acorn squash, butternut squash, corn, and peas.  Fill 1/4 of your plate with protein. Choose low-fat cuts of meat like boneless, skinless chicken breast; pork loin; 90% lean beef; lean and skinless turkey meat; and fresh fish (not fried) such as tuna, salmon, or mackerel.  Add a low-fat or non-fat dairy product like 8 ounces (240 mL) of 1% or skim milk or 6 ounces (180 mL) of low-fat yogurt. Eat the recommended serving. Milk and yogurt have carbohydrates which raise your blood sugar.  Add a small piece of fruit or 1/2 cup (120 grams) of frozen or canned fruit. Choose canned fruits in juice or water. Fruits include apples, bananas, peaches, pears, pineapple, plums, and oranges. Eat the recommended serving. Fruit has carbohydrates which can raise your blood sugar.  Include healthy fats in your meal like olive oil, canola oil, avocado, and nuts.  Other good foods to include in your diet are:  Foods high in fiber. Adding fiber to your meals may help control your blood sugar and cholesterol levels. It may also help with weight loss and prevent belly problems. If you are younger than 50, it is recommended to get 25 to 38 grams per day. If you are older than 50, it is recommended to get 21 to 30 grams per day. Good sources of fiber include:  Nuts and seeds  Beans, peas, and other legumes  Whole-grain products  Fruits  Vegetables  Smart snacks in the right portion. Do not go too long in between meals. Ask your dietitian when you should have a snack in between your meals. Snack on  things like:  Nuts  Vegetables and low-fat dressing  Light popcorn  Low-fat cheese and crackers  1/2 sandwich    What foods should be limited or avoided?  You may need to limit the amount of some foods you eat and how often you eat them. Foods that should be limited include:  High fat or processed foods like:  Santiago  Sausage  Hot dogs  Whole-fat dairy products  Potato chips  Foods high in sodium like:  Canned soups  Soy sauce and some salad dressings  Cured meats  Salted snack foods like pretzels  Olives  Fats and oils like:  Margarine  Salad dressing  Gravy  Lard or shortening  Foods high in sugar like:  Candy  Cookies  Cake  Ice cream  Table sugar  Soda  Juice drinks  Starches that are not whole grain like:  White rice  French fries  White pasta  White bread  Sugary cereals  Baked goods, pastries, croissants  Beer, wine, and mixed drinks (alcohol). Drink alcohol in moderation (1 drink per day or less for adult women and 2 drinks per day or less for adult men). Drinking alcohol can cause fluctuations in your blood sugar and interfere with how your diabetic drugs work. Talk to your doctor about how you can safely include alcohol into your diet.    What can be done to prevent this health problem?  Some people have a higher chance of developing diabetes than others. This is a life-long problem. You can still lead a normal life. Diabetes can be managed through diet, exercise, and drugs. It is important to have support from your family and friends to help you with your goals.    When do I need to call the doctor?  Blood sugar level is above 240 mg/dL for more than a day  Blood sugar level drops to less than 40 and does not respond to 15 grams of simple carbohydrate, like 4 glucose tablets or 1/2 cup (120 mL) of fruit juice  Trouble breathing  Very sleepy and trouble concentrating  Feeling very thirsty  Needing to urinate (pee) more than normal  Throw up more than once or have many loose stools  You are so sick you  cannot eat or drink  Fever over 101°F (38°C)  Questions about your diet plan  You are not feeling better in 2 to 3 days or you are feeling worse    Helpful tips  Plan ahead. Plan your meals and grocery list before going to the store. This will help you to choose foods that are good for you.  Pack a lunch. Take healthy meals and snacks with you to work.  Keep a food journal to help keep you on track. Take note of foods that keep your blood sugar in goal range. Also note foods and meals that raise or lower your blood sugar. There are apps for your phone that can help with this.  Visit a restaurant's website before eating out. You can see the menu options and nutritional facts. This way, you can see which items best fit into your diet plan. Call ahead if you have questions.    Disclaimer.This generalized information is a limited summary of diagnosis, treatment, and/or medication information. It is not meant to be comprehensive and should be used as a tool to help the user understand and/or assess potential diagnostic and treatment options. It does NOT include all information about conditions, treatments, medications, side effects, or risks that may apply to a specific patient. It is not intended to be medical advice or a substitute for the medical advice, diagnosis, or treatment of a health care provider based on the health care provider's examination and assessment of a patient's specific and unique circumstances. Patients must speak with a health care provider for complete information about their health, medical questions, and treatment options, including any risks or benefits regarding use of medications. This information does not endorse any treatments or medications as safe, effective, or approved for treating a specific patient. [employer] and its affiliates disclaim any warranty or liability relating to this information or the use thereof. The use of this information is governed by the Terms of Use,  available at Terms of Use. ©2022 [employer] and its affiliates and/or licensors. All rights reserved.

## 2024-09-16 NOTE — PROGRESS NOTES
Subjective:       Patient ID: Catrachita Johnson is a 64 y.o. female.    Chief Complaint: Hyperlipidemia and Diabetes      Patient is here for follow-up on hypertension.  Does not have diabetes.  BP Readings from Last 3 Encounters:  09/16/24 : 124/84  09/10/24 : (!) 161/77  03/15/24 : 138/82  Lab Results       Component                Value               Date                       WBC                      5.1                 09/04/2024                 HGB                      12.3                09/04/2024                 HCT                      38.7                09/04/2024                 PLT                      279                 09/04/2024                 ALT                      23                  09/04/2024                 AST                      26                  09/04/2024                 NA                       139                 09/04/2024                 K                        4.4                 09/04/2024                 CL                       104                 09/04/2024                 CREATININE               0.81                09/04/2024                 BUN                      11                  09/04/2024                 CO2                      28                  09/04/2024                  Hyperlipidemia  This is a chronic problem. This is a new diagnosis. The problem is controlled. Recent lipid tests were reviewed and are normal. Pertinent negatives include no chest pain or shortness of breath.   Diabetes  Pertinent negatives for hypoglycemia include no headaches or sweats. Pertinent negatives for diabetes include no blurred vision and no chest pain.   Hypertension  This is a recurrent problem. The current episode started more than 1 year ago. The problem has been resolved since onset. The problem is controlled. Pertinent negatives include no anxiety, blurred vision, chest pain, headaches, malaise/fatigue, neck pain, orthopnea, palpitations, peripheral edema, PND, shortness of  breath or sweats. There are no associated agents to hypertension. Risk factors for coronary artery disease include obesity. Past treatments include nothing. The current treatment provides moderate improvement. Compliance problems include exercise.        Allergies and Medications:   Review of patient's allergies indicates:   Allergen Reactions    Bactrim [sulfamethoxazole-trimethoprim] Hives and Itching    Cherries     Tetracyclines      Current Outpatient Medications   Medication Sig Dispense Refill    chlorhexidine (PERIDEX) 0.12 % solution rinse and spit with one ounce every night at bedtime 473 mL 0    enalapril (VASOTEC) 10 MG tablet Take 1 tablet (10 mg total) by mouth once daily. 90 tablet 1    fluticasone propionate (FLONASE) 50 mcg/actuation nasal spray 1 spray (50 mcg total) by Each Nostril route once daily. 16 g 11    ibuprofen (ADVIL,MOTRIN) 800 MG tablet Take 1 tablet (800 mg total) by mouth every 6 to 8 hours as needed. 30 tablet 0    nystatin (MYCOSTATIN) cream Apply topically 2 (two) times daily. 15 g 1    mometasone 0.1% (ELOCON) 0.1 % cream Apply topically to affected area(s) once daily for 10 days 45 g 3    nystatin-triamcinolone (MYCOLOG) ointment Apply topically 2 (two) times daily for 14 days 30 g 2     No current facility-administered medications for this visit.       Family History:   Family History   Problem Relation Name Age of Onset    Stroke Mother      No Known Problems Father      Stroke Maternal Grandmother         Social History:   Social History     Socioeconomic History    Marital status:    Tobacco Use    Smoking status: Never    Smokeless tobacco: Never   Substance and Sexual Activity    Alcohol use: No    Drug use: No     Social Determinants of Health     Financial Resource Strain: Low Risk  (3/4/2024)    Overall Financial Resource Strain (CARDIA)     Difficulty of Paying Living Expenses: Not hard at all   Food Insecurity: No Food Insecurity (3/4/2024)    Hunger Vital Sign      Worried About Running Out of Food in the Last Year: Never true     Ran Out of Food in the Last Year: Never true   Transportation Needs: No Transportation Needs (3/4/2024)    PRAPARE - Transportation     Lack of Transportation (Medical): No     Lack of Transportation (Non-Medical): No   Physical Activity: Inactive (3/4/2024)    Exercise Vital Sign     Days of Exercise per Week: 0 days     Minutes of Exercise per Session: 0 min   Stress: No Stress Concern Present (3/4/2024)    [employer] - Occupational Stress Questionnaire     Feeling of Stress : Not at all   Housing Stability: Low Risk  (3/4/2024)    Housing Stability Vital Sign     Unable to Pay for Housing in the Last Year: No     Number of Places Lived in the Last Year: 0     Unstable Housing in the Last Year: No       Review of Systems   Constitutional:  Negative for malaise/fatigue.   Eyes:  Negative for blurred vision.        Patient had a recent basal cell removed from the right lower lid which is healing well.   Respiratory:  Negative for shortness of breath.    Cardiovascular:  Negative for chest pain, palpitations, orthopnea and PND.   Musculoskeletal:  Negative for neck pain.   Neurological:  Negative for headaches.       Objective:     Vitals:    09/16/24 0946   BP: 124/84   Pulse: 67   Resp: 18   Temp: 98.4 °F (36.9 °C)        Physical Exam  Vitals and nursing note reviewed.   Constitutional:       General: She is not in acute distress.     Appearance: Normal appearance. She is well-developed and normal weight. She is not ill-appearing, toxic-appearing or diaphoretic.   HENT:      Head: Normocephalic and atraumatic.   Eyes:      Pupils: Pupils are equal, round, and reactive to light.   Cardiovascular:      Rate and Rhythm: Normal rate and regular rhythm.      Heart sounds: Normal heart sounds. No murmur heard.     No friction rub. No gallop.   Pulmonary:      Effort: Pulmonary effort is normal. No respiratory distress.       Breath sounds: Normal breath sounds. No stridor. No wheezing, rhonchi or rales.   Chest:      Chest wall: No tenderness.   Musculoskeletal:      Right lower leg: No edema.      Left lower leg: No edema.   Neurological:      Mental Status: She is alert.   Psychiatric:         Behavior: Behavior normal.         Thought Content: Thought content normal.         Judgment: Judgment normal.         Assessment:       1. Mixed hyperlipidemia    2. Kidney stones    3. Palpitations    4. Preventative health care        Plan:       Catrachita was seen today for hyperlipidemia and diabetes.    Diagnoses and all orders for this visit:    Mixed hyperlipidemia  -     Lipid Panel; Future  -     Ambulatory referral/consult to Cardiology; Future    Kidney stones  -     Vitamin D; Future    Palpitations  -     Ambulatory referral/consult to Cardiology; Future    Preventative health care  -     Hemoglobin A1C; Future         Follow up in about 6 months (around 3/16/2025).

## 2024-09-17 NOTE — PROGRESS NOTES
Cholesterol is still quite high as is the LDL we need to consider a statin I can send in if you request or we can come in to discuss options.

## 2024-09-18 ENCOUNTER — TELEPHONE (OUTPATIENT)
Dept: FAMILY MEDICINE | Facility: CLINIC | Age: 64
End: 2024-09-18
Payer: COMMERCIAL

## 2024-09-18 NOTE — TELEPHONE ENCOUNTER
----- Message from Jean-Pierre Waller MD sent at 9/17/2024  8:19 AM CDT -----  Cholesterol is still quite high as is the LDL we need to consider a statin I can send in if you request or we can come in to discuss options.

## 2024-09-27 ENCOUNTER — OFFICE VISIT (OUTPATIENT)
Dept: CARDIOLOGY | Facility: CLINIC | Age: 64
End: 2024-09-27
Payer: COMMERCIAL

## 2024-09-27 VITALS
SYSTOLIC BLOOD PRESSURE: 176 MMHG | HEART RATE: 63 BPM | WEIGHT: 176.38 LBS | BODY MASS INDEX: 31.25 KG/M2 | DIASTOLIC BLOOD PRESSURE: 92 MMHG | HEIGHT: 63 IN

## 2024-09-27 DIAGNOSIS — I10 HYPERTENSION, UNSPECIFIED TYPE: Primary | ICD-10-CM

## 2024-09-27 DIAGNOSIS — E78.2 MIXED HYPERLIPIDEMIA: ICD-10-CM

## 2024-09-27 LAB
OHS QRS DURATION: 94 MS
OHS QTC CALCULATION: 428 MS

## 2024-09-27 PROCEDURE — 99999 PR PBB SHADOW E&M-EST. PATIENT-LVL IV: CPT | Mod: PBBFAC,,, | Performed by: INTERNAL MEDICINE

## 2024-09-27 RX ORDER — ROSUVASTATIN CALCIUM 10 MG/1
10 TABLET, COATED ORAL NIGHTLY
Qty: 30 TABLET | Refills: 5 | Status: SHIPPED | OUTPATIENT
Start: 2024-09-27 | End: 2025-03-26

## 2024-09-27 NOTE — PROGRESS NOTES
Subjective:    Patient ID:  Catrachita Johnson is a 64 y.o. female patient here for evaluation Hypertension      History of Present Illness:     54-year-old female referred here from primary care.  Patient with a past medical history hypertension.  Dyslipidemia on recent blood work not on any medication.  History of breast cancer status post surgery and radiation therapy.  Denies any exertional anginal symptoms at baseline.  Referred for evaluation of palpitations per referral however patient denies any palpitations. blood  Pressure fluctuating at home.  Sometimes normal ,sometimes systolic blood pressure elevated up to 160.         Review of patient's allergies indicates:   Allergen Reactions    Bactrim [sulfamethoxazole-trimethoprim] Hives and Itching    Cherries     Tetracyclines        Past Medical History:   Diagnosis Date    Cancer     breast    Ductal carcinoma in situ (DCIS) of left breast 2017    Hypertension     Mass of left axilla     Use of tamoxifen (Nolvadex) 2017     Past Surgical History:   Procedure Laterality Date    BREAST SURGERY Left 2017    Lumpectomy     SECTION      MYOMECTOMY       Social History     Tobacco Use    Smoking status: Never    Smokeless tobacco: Never   Substance Use Topics    Alcohol use: No    Drug use: No        Review of Systems   Negative except as mentioned in HPI         Objective        Vitals:    24 1138   BP: (!) 176/92   Pulse: 63       LIPIDS - LAST 2   Lab Results   Component Value Date    CHOL 230 (H) 2024    HDL 48 2024    LDLCALC 154.6 2024    TRIG 137 2024    CHOLHDL 20.9 2024       CBC - LAST 2  Lab Results   Component Value Date    WBC 5.1 2024    WBC 4.6 2024    RBC 4.28 2024    RBC 4.09 2024    HGB 12.3 2024    HGB 11.6 (L) 2024    HCT 38.7 2024    HCT 35.9 2024    MCV 90.4 2024    MCV 87.8 2024    MCH 28.7 2024    MCH 28.4 2024  "   MCHC 31.8 (L) 09/04/2024    MCHC 32.3 02/29/2024    RDW 13.7 09/04/2024    RDW 13.4 02/29/2024     09/04/2024     02/29/2024    MPV 9.6 09/04/2024    MPV 9.5 02/29/2024    GRAN 64 12/28/2017    GRAN 62.2 09/08/2017    GRAN 3.4 09/08/2017    LYMPH 1,719 09/04/2024    LYMPH 33.7 09/04/2024    MONO 337 09/04/2024    MONO 6.6 09/04/2024    BASO 31 09/04/2024    BASO 32 02/29/2024       CHEMISTRY & LIVER FUNCTION - LAST 2  Lab Results   Component Value Date     09/04/2024     02/29/2024    K 4.4 09/04/2024    K 4.1 02/29/2024     09/04/2024     02/29/2024    CO2 28 09/04/2024    CO2 27 02/29/2024    BUN 11 09/04/2024    BUN 12 02/29/2024    CREATININE 0.81 09/04/2024    CREATININE 0.80 02/29/2024    GLU 86 09/04/2024    GLU 94 02/29/2024    CALCIUM 9.2 09/04/2024    CALCIUM 9.5 02/29/2024    ALBUMIN 4.2 09/04/2024    ALBUMIN 4.3 02/29/2024    PROT 7.1 09/04/2024    PROT 7.2 02/29/2024    ALKPHOS 62 07/30/2020    ALKPHOS 73 01/20/2020    ALT 23 09/04/2024    ALT 19 02/29/2024    AST 26 09/04/2024    AST 19 02/29/2024    BILITOT 0.4 09/04/2024    BILITOT 0.4 02/29/2024        CARDIAC PROFILE - LAST 2  No results found for: "BNP", "CPK", "CPKMB", "LDH", "TROPONINI", "TROPONINIHS"     COAGULATION - LAST 2  No results found for: "LABPT", "INR", "APTT"    ENDOCRINE & PSA - LAST 2  Lab Results   Component Value Date    HGBA1C 5.4 09/16/2024        ECHOCARDIOGRAM RESULTS  No results found for this or any previous visit.      CURRENT/PREVIOUS VISIT EKG  No results found for this or any previous visit.  No valid procedures specified.   No results found for this or any previous visit.    No valid procedures specified.          PREVIOUS STRESS TEST              PREVIOUS ANGIOGRAM        PHYSICAL EXAM    CONSTITUTIONAL: Well built, well nourished in no apparent distress  HEENT: No pallor  NECK: no JVD  LUNGS: CTA b/l  HEART: regular rate and rhythm, S1, S2 normal, no murmur   ABDOMEN: " nondistended   EXTREMITIES: No edema  NEURO: AAO X 3   SKIN:  No rash  Psych:  Normal affect    I HAVE REVIEWED :    The vital signs, nurses notes, and all the pertinent radiology and labs.        Current Outpatient Medications   Medication Instructions    chlorhexidine (PERIDEX) 0.12 % solution rinse and spit with one ounce every night at bedtime    enalapril (VASOTEC) 10 mg, Oral, Daily    fluticasone propionate (FLONASE) 50 mcg, Each Nostril, Daily    ibuprofen (ADVIL,MOTRIN) 800 mg, Oral, Every 6-8 hours PRN    mometasone 0.1% (ELOCON) 0.1 % cream Apply topically to affected area(s) once daily for 10 days    nystatin (MYCOSTATIN) cream Topical (Top), 2 times daily    nystatin-triamcinolone (MYCOLOG) ointment Apply topically 2 (two) times daily for 14 days    rosuvastatin (CRESTOR) 10 mg, Oral, Nightly        ECG reviewed by me: NSR, LAD  Assessment & Plan     HTN- labile  DLD    No anginal symptoms  Denies any palpitations  Clinically euvolemic    Plan:  Continue enalapril  LDL more than 150.  Discussed regarding statin therapy.  Start Crestor 10 mg q.h.s..  Advised to call the office in case of any myalgias.  Repeat lipid profile and LFTs in 2-3 months.  Advised close Home BP monitoring  Provided BP log  Echocardiogram  Follow up in 3 weeks for evaluation of hypertension    Follow up in about 3 weeks (around 10/18/2024).

## 2024-10-03 ENCOUNTER — HOSPITAL ENCOUNTER (OUTPATIENT)
Dept: RADIOLOGY | Facility: HOSPITAL | Age: 64
Discharge: HOME | End: 2024-10-03
Attending: FAMILY MEDICINE
Payer: COMMERCIAL

## 2024-10-03 ENCOUNTER — TELEPHONE (OUTPATIENT)
Dept: FAMILY MEDICINE | Facility: CLINIC | Age: 64
End: 2024-10-03
Payer: COMMERCIAL

## 2024-10-03 DIAGNOSIS — Z78.0 MENOPAUSE: ICD-10-CM

## 2024-10-03 DIAGNOSIS — M85.88 OSTEOPENIA OF LUMBAR SPINE: Primary | ICD-10-CM

## 2024-10-03 PROCEDURE — 77080 DXA BONE DENSITY AXIAL: CPT | Mod: TC,PO

## 2024-10-03 PROCEDURE — 77080 DXA BONE DENSITY AXIAL: CPT | Mod: 26,,, | Performed by: RADIOLOGY

## 2024-10-03 RX ORDER — LYSINE HCL 500 MG
1 TABLET ORAL DAILY
Qty: 90 TABLET | Refills: 3 | Status: SHIPPED | OUTPATIENT
Start: 2024-10-03 | End: 2025-10-03

## 2024-10-03 RX ORDER — IBANDRONATE SODIUM 150 MG/1
150 TABLET, FILM COATED ORAL
Qty: 1 TABLET | Refills: 11 | Status: SHIPPED | OUTPATIENT
Start: 2024-10-03 | End: 2025-10-03

## 2024-10-03 NOTE — TELEPHONE ENCOUNTER
After reviewing her mamm/  usn result done 7/3/23, the sebaceous cyst found on usn shows some erythema, possibility of infection.   Order her bactrim DS 1 po bid x 7 days to see if this new abx helps better.       Also ask patient if she is feeling any vaginal itching or discomfort from the dynapen.   If so, oder her a diflucan 150 mg po tablet x 1 dose to help prevent yeast infection if taking a second abx,          Portal message sent regarding results.

## 2024-10-03 NOTE — PROGRESS NOTES
The DEXA scan shows decreased bone density at the lumbar spine would recommend calcium and vitamin-D and a bisphosphonate I will send in prescriptions and will recheck your bone density in 2 years

## 2024-10-03 NOTE — TELEPHONE ENCOUNTER
----- Message from Jean-Pierre Waller MD sent at 10/3/2024 11:38 AM CDT -----  The DEXA scan shows decreased bone density at the lumbar spine would recommend calcium and vitamin-D and a bisphosphonate I will send in prescriptions and will recheck your bone density in 2 years

## 2024-10-08 ENCOUNTER — HOSPITAL ENCOUNTER (OUTPATIENT)
Dept: CARDIOLOGY | Facility: HOSPITAL | Age: 64
Discharge: HOME | End: 2024-10-08
Attending: INTERNAL MEDICINE
Payer: COMMERCIAL

## 2024-10-08 VITALS — BODY MASS INDEX: 31.18 KG/M2 | HEIGHT: 63 IN | WEIGHT: 176 LBS

## 2024-10-08 DIAGNOSIS — I10 HYPERTENSION, UNSPECIFIED TYPE: ICD-10-CM

## 2024-10-08 LAB
AORTIC ROOT ANNULUS: 3.2 CM
AORTIC VALVE CUSP SEPERATION: 2.4 CM
AV INDEX (PROSTH): 0.94
AV MEAN GRADIENT: 2 MMHG
AV PEAK GRADIENT: 2.6 MMHG
AV VALVE AREA BY VELOCITY RATIO: 3.8 CM²
AV VALVE AREA: 3.6 CM²
AV VELOCITY RATIO: 1
BSA FOR ECHO PROCEDURE: 1.88 M2
CV ECHO LV RWT: 0.46 CM
DOP CALC AO PEAK VEL: 0.8 M/S
DOP CALC AO VTI: 16.6 CM
DOP CALC LVOT AREA: 3.8 CM2
DOP CALC LVOT DIAMETER: 2.2 CM
DOP CALC LVOT PEAK VEL: 0.8 M/S
DOP CALC LVOT STROKE VOLUME: 59.3 CM3
DOP CALCLVOT PEAK VEL VTI: 15.6 CM
E WAVE DECELERATION TIME: 180 MSEC
E/A RATIO: 0.38
E/E' RATIO: 5.54 M/S
ECHO LV POSTERIOR WALL: 0.9 CM (ref 0.6–1.1)
FRACTIONAL SHORTENING: 41 % (ref 28–44)
INTERVENTRICULAR SEPTUM: 1 CM (ref 0.6–1.1)
IVRT: 130 MSEC
LEFT INTERNAL DIMENSION IN SYSTOLE: 2.3 CM (ref 2.1–4)
LEFT VENTRICLE DIASTOLIC VOLUME INDEX: 36.01 ML/M2
LEFT VENTRICLE DIASTOLIC VOLUME: 65.9 ML
LEFT VENTRICLE MASS INDEX: 62.1 G/M2
LEFT VENTRICLE SYSTOLIC VOLUME INDEX: 9.9 ML/M2
LEFT VENTRICLE SYSTOLIC VOLUME: 18.1 ML
LEFT VENTRICULAR INTERNAL DIMENSION IN DIASTOLE: 3.9 CM (ref 3.5–6)
LEFT VENTRICULAR MASS: 113.6 G
LV LATERAL E/E' RATIO: 7.2 M/S
LV SEPTAL E/E' RATIO: 4.5 M/S
LVED V (TEICH): 65.9 ML
LVES V (TEICH): 18.1 ML
LVOT MG: 1 MMHG
LVOT MV: 0.49 CM/S
MV PEAK A VEL: 0.96 M/S
MV PEAK E VEL: 0.36 M/S
MV STENOSIS PRESSURE HALF TIME: 61 MS
MV VALVE AREA P 1/2 METHOD: 3.61 CM2
PISA TR MAX VEL: 2.52 M/S
RIGHT VENTRICULAR END-DIASTOLIC DIMENSION: 1.8 CM
TDI LATERAL: 0.05 M/S
TDI SEPTAL: 0.08 M/S
TDI: 0.07 M/S
TR MAX PG: 25 MMHG
Z-SCORE OF LEFT VENTRICULAR DIMENSION IN END DIASTOLE: -2.63
Z-SCORE OF LEFT VENTRICULAR DIMENSION IN END SYSTOLE: -2.44

## 2024-10-08 PROCEDURE — 93306 TTE W/DOPPLER COMPLETE: CPT

## 2024-10-14 ENCOUNTER — OFFICE VISIT (OUTPATIENT)
Dept: CARDIOLOGY | Facility: CLINIC | Age: 64
End: 2024-10-14
Payer: COMMERCIAL

## 2024-10-14 VITALS
SYSTOLIC BLOOD PRESSURE: 135 MMHG | BODY MASS INDEX: 31.25 KG/M2 | HEIGHT: 63 IN | DIASTOLIC BLOOD PRESSURE: 89 MMHG | WEIGHT: 176.38 LBS

## 2024-10-14 DIAGNOSIS — I10 HYPERTENSION, UNSPECIFIED TYPE: Primary | ICD-10-CM

## 2024-10-14 DIAGNOSIS — E78.2 MIXED HYPERLIPIDEMIA: ICD-10-CM

## 2024-10-14 PROCEDURE — 99999 PR PBB SHADOW E&M-EST. PATIENT-LVL III: CPT | Mod: PBBFAC,,, | Performed by: INTERNAL MEDICINE

## 2024-10-14 PROCEDURE — 3044F HG A1C LEVEL LT 7.0%: CPT | Mod: CPTII,S$GLB,, | Performed by: INTERNAL MEDICINE

## 2024-10-14 PROCEDURE — 3075F SYST BP GE 130 - 139MM HG: CPT | Mod: CPTII,S$GLB,, | Performed by: INTERNAL MEDICINE

## 2024-10-14 PROCEDURE — 4010F ACE/ARB THERAPY RXD/TAKEN: CPT | Mod: CPTII,S$GLB,, | Performed by: INTERNAL MEDICINE

## 2024-10-14 PROCEDURE — 3079F DIAST BP 80-89 MM HG: CPT | Mod: CPTII,S$GLB,, | Performed by: INTERNAL MEDICINE

## 2024-10-14 PROCEDURE — 1101F PT FALLS ASSESS-DOCD LE1/YR: CPT | Mod: CPTII,S$GLB,, | Performed by: INTERNAL MEDICINE

## 2024-10-14 PROCEDURE — 3288F FALL RISK ASSESSMENT DOCD: CPT | Mod: CPTII,S$GLB,, | Performed by: INTERNAL MEDICINE

## 2024-10-14 PROCEDURE — 3008F BODY MASS INDEX DOCD: CPT | Mod: CPTII,S$GLB,, | Performed by: INTERNAL MEDICINE

## 2024-10-14 PROCEDURE — 1159F MED LIST DOCD IN RCRD: CPT | Mod: CPTII,S$GLB,, | Performed by: INTERNAL MEDICINE

## 2024-10-14 PROCEDURE — 99214 OFFICE O/P EST MOD 30 MIN: CPT | Mod: S$GLB,,, | Performed by: INTERNAL MEDICINE

## 2024-10-14 NOTE — PROGRESS NOTES
Subjective:    Patient ID:  Catrachita Johnson is a 64 y.o. female patient here for evaluation Results and Hypertension      Follow up visit  10/14/24:  Denies any new symptoms.  Compliant with medications.  BP mildly elevated in the morning however low-normal in the evening.  She uses wrist blood pressure cuff.  Following diet    History of Present Illness during initial clinic visit:     54-year-old female referred here from primary care.  Patient with a past medical history hypertension.  Dyslipidemia on recent blood work not on any medication.  History of breast cancer status post surgery and radiation therapy.  Denies any exertional anginal symptoms at baseline.  Referred for evaluation of palpitations per referral however patient denies any palpitations. blood  Pressure fluctuating at home.  Sometimes normal ,sometimes systolic blood pressure elevated up to 160.         Review of patient's allergies indicates:   Allergen Reactions    Bactrim [sulfamethoxazole-trimethoprim] Hives and Itching    Cherries     Tetracyclines        Past Medical History:   Diagnosis Date    Cancer     breast    Ductal carcinoma in situ (DCIS) of left breast 2017    Hypertension     Mass of left axilla     Use of tamoxifen (Nolvadex) 2017     Past Surgical History:   Procedure Laterality Date    BREAST SURGERY Left 2017    Lumpectomy     SECTION      MYOMECTOMY       Social History     Tobacco Use    Smoking status: Never    Smokeless tobacco: Never   Substance Use Topics    Alcohol use: No    Drug use: No        Review of Systems   Negative except as mentioned in HPI         Objective        Vitals:    10/14/24 1017   BP: 135/89   Pulse: (P) 70       LIPIDS - LAST 2   Lab Results   Component Value Date    CHOL 230 (H) 2024    HDL 48 2024    LDLCALC 154.6 2024    TRIG 137 2024    CHOLHDL 20.9 2024       CBC - LAST 2  Lab Results   Component Value Date    WBC 5.1 2024    WBC  "4.6 02/29/2024    RBC 4.28 09/04/2024    RBC 4.09 02/29/2024    HGB 12.3 09/04/2024    HGB 11.6 (L) 02/29/2024    HCT 38.7 09/04/2024    HCT 35.9 02/29/2024    MCV 90.4 09/04/2024    MCV 87.8 02/29/2024    MCH 28.7 09/04/2024    MCH 28.4 02/29/2024    MCHC 31.8 (L) 09/04/2024    MCHC 32.3 02/29/2024    RDW 13.7 09/04/2024    RDW 13.4 02/29/2024     09/04/2024     02/29/2024    MPV 9.6 09/04/2024    MPV 9.5 02/29/2024    GRAN 64 12/28/2017    GRAN 62.2 09/08/2017    GRAN 3.4 09/08/2017    LYMPH 1,719 09/04/2024    LYMPH 33.7 09/04/2024    MONO 337 09/04/2024    MONO 6.6 09/04/2024    BASO 31 09/04/2024    BASO 32 02/29/2024       CHEMISTRY & LIVER FUNCTION - LAST 2  Lab Results   Component Value Date     09/04/2024     02/29/2024    K 4.4 09/04/2024    K 4.1 02/29/2024     09/04/2024     02/29/2024    CO2 28 09/04/2024    CO2 27 02/29/2024    BUN 11 09/04/2024    BUN 12 02/29/2024    CREATININE 0.81 09/04/2024    CREATININE 0.80 02/29/2024    GLU 86 09/04/2024    GLU 94 02/29/2024    CALCIUM 9.2 09/04/2024    CALCIUM 9.5 02/29/2024    ALBUMIN 4.2 09/04/2024    ALBUMIN 4.3 02/29/2024    PROT 7.1 09/04/2024    PROT 7.2 02/29/2024    ALKPHOS 62 07/30/2020    ALKPHOS 73 01/20/2020    ALT 23 09/04/2024    ALT 19 02/29/2024    AST 26 09/04/2024    AST 19 02/29/2024    BILITOT 0.4 09/04/2024    BILITOT 0.4 02/29/2024        CARDIAC PROFILE - LAST 2  No results found for: "BNP", "CPK", "CPKMB", "LDH", "TROPONINI", "TROPONINIHS"     COAGULATION - LAST 2  No results found for: "LABPT", "INR", "APTT"    ENDOCRINE & PSA - LAST 2  Lab Results   Component Value Date    HGBA1C 5.4 09/16/2024        ECHOCARDIOGRAM RESULTS  No results found for this or any previous visit.      CURRENT/PREVIOUS VISIT EKG  Results for orders placed or performed in visit on 09/27/24   IN OFFICE EKG 12-LEAD (to [place])    Collection Time: 09/27/24 11:32 AM   Result Value Ref Range    QRS Duration 94 ms    [facility] QTC " Calculation 428 ms    Narrative    Test Reason : I10,    Vent. Rate : 061 BPM     Atrial Rate : 061 BPM     P-R Int : 190 ms          QRS Dur : 094 ms      QT Int : 426 ms       P-R-T Axes : 025 -31 008 degrees     QTc Int : 428 ms    Normal sinus rhythm  Left axis deviation  Abnormal ECG  No previous ECGs available  Confirmed by Bekah VALDES, Debbi Gonzales ([id number]) on 9/27/2024 4:23:27 PM    Referred By: ANJU OH           Confirmed By:Debbi Godfrey MD     No valid procedures specified.   No results found for this or any previous visit.    No valid procedures specified.          PREVIOUS STRESS TEST              PREVIOUS ANGIOGRAM        PHYSICAL EXAM    CONSTITUTIONAL: Well built, well nourished in no apparent distress  HEENT: No pallor  NECK: no JVD  LUNGS: CTA b/l  HEART: regular rate and rhythm, S1, S2 normal, no murmur   ABDOMEN: nondistended   EXTREMITIES: No edema  NEURO: AAO X 3   SKIN:  No rash  Psych:  Normal affect    I HAVE REVIEWED :    The vital signs, nurses notes, and all the pertinent radiology and labs.        Current Outpatient Medications   Medication Instructions    calcium carbonate-vit D3-min 600 mg calcium- 400 unit Tab 1 tablet, Oral, Daily    chlorhexidine (PERIDEX) 0.12 % solution rinse and spit with one ounce every night at bedtime    enalapril (VASOTEC) 10 mg, Oral, Daily    fluticasone propionate (FLONASE) 50 mcg, Each Nostril, Daily    ibandronate (BONIVA) 150 mg, Oral, Every 30 days    ibuprofen (ADVIL,MOTRIN) 800 mg, Oral, Every 6-8 hours PRN    mometasone 0.1% (ELOCON) 0.1 % cream Apply topically to affected area(s) once daily for 10 days    nystatin (MYCOSTATIN) cream Topical (Top), 2 times daily    nystatin-triamcinolone (MYCOLOG) ointment Apply topically 2 (two) times daily for 14 days    rosuvastatin (CRESTOR) 10 mg, Oral, Nightly     Assessment & Plan     HTN- labile  DLD    No anginal symptoms  Denies any palpitations  Clinically euvolemic  .  Echo reviewed.  Preserved  ejection fraction.  No major valvular heart disease.    Plan:  Continue enalapril.  Advised to take half pill in the morning and half pill at night.  Advised to use arm blood pressure cuff instead of wrist cuff  LDL more than 150.  Started  Crestor 10 mg q.h.s..  During the last clinic visit.  Tolerating the medication well.  Repeat lipid profile and LFTs in 1-2 months  Advised close Home BP monitoring  Provided BP logs    Follow up in about 3 months (around 1/14/2025).

## 2024-12-15 DIAGNOSIS — I10 HYPERTENSION, UNSPECIFIED TYPE: ICD-10-CM

## 2024-12-16 RX ORDER — ENALAPRIL MALEATE 10 MG/1
10 TABLET ORAL DAILY
Qty: 90 TABLET | Refills: 0 | Status: SHIPPED | OUTPATIENT
Start: 2024-12-16

## 2025-01-28 ENCOUNTER — OFFICE VISIT (OUTPATIENT)
Dept: FAMILY MEDICINE | Facility: CLINIC | Age: 65
End: 2025-01-28
Payer: COMMERCIAL

## 2025-01-28 VITALS
TEMPERATURE: 98 F | SYSTOLIC BLOOD PRESSURE: 130 MMHG | OXYGEN SATURATION: 96 % | HEART RATE: 81 BPM | WEIGHT: 179.69 LBS | DIASTOLIC BLOOD PRESSURE: 77 MMHG | HEIGHT: 63 IN | BODY MASS INDEX: 31.84 KG/M2

## 2025-01-28 DIAGNOSIS — H10.9 CONJUNCTIVITIS OF RIGHT EYE, UNSPECIFIED CONJUNCTIVITIS TYPE: ICD-10-CM

## 2025-01-28 DIAGNOSIS — I83.11 VARICOSE VEINS OF RIGHT LOWER EXTREMITY WITH INFLAMMATION: ICD-10-CM

## 2025-01-28 DIAGNOSIS — H00.011 HORDEOLUM EXTERNUM OF RIGHT UPPER EYELID: Primary | ICD-10-CM

## 2025-01-28 PROCEDURE — 3008F BODY MASS INDEX DOCD: CPT | Mod: CPTII,S$GLB,, | Performed by: NURSE PRACTITIONER

## 2025-01-28 PROCEDURE — 99999 PR PBB SHADOW E&M-EST. PATIENT-LVL III: CPT | Mod: PBBFAC,,, | Performed by: NURSE PRACTITIONER

## 2025-01-28 PROCEDURE — 99214 OFFICE O/P EST MOD 30 MIN: CPT | Mod: S$GLB,,, | Performed by: NURSE PRACTITIONER

## 2025-01-28 PROCEDURE — 1126F AMNT PAIN NOTED NONE PRSNT: CPT | Mod: CPTII,S$GLB,, | Performed by: NURSE PRACTITIONER

## 2025-01-28 PROCEDURE — 3075F SYST BP GE 130 - 139MM HG: CPT | Mod: CPTII,S$GLB,, | Performed by: NURSE PRACTITIONER

## 2025-01-28 PROCEDURE — 1101F PT FALLS ASSESS-DOCD LE1/YR: CPT | Mod: CPTII,S$GLB,, | Performed by: NURSE PRACTITIONER

## 2025-01-28 PROCEDURE — 3078F DIAST BP <80 MM HG: CPT | Mod: CPTII,S$GLB,, | Performed by: NURSE PRACTITIONER

## 2025-01-28 PROCEDURE — 3288F FALL RISK ASSESSMENT DOCD: CPT | Mod: CPTII,S$GLB,, | Performed by: NURSE PRACTITIONER

## 2025-01-28 PROCEDURE — 1159F MED LIST DOCD IN RCRD: CPT | Mod: CPTII,S$GLB,, | Performed by: NURSE PRACTITIONER

## 2025-01-28 RX ORDER — CIPROFLOXACIN HYDROCHLORIDE 3 MG/ML
1 SOLUTION/ DROPS OPHTHALMIC
Qty: 5 ML | Refills: 0 | Status: SHIPPED | OUTPATIENT
Start: 2025-01-28 | End: 2025-02-06

## 2025-01-28 NOTE — PROGRESS NOTES
This dictation has been generated using Modal Fluency Dictation some phonetic errors may occur. Please contact author for clarification if needed.     1. Hordeolum externum of right upper eyelid    2. Conjunctivitis of right eye, unspecified conjunctivitis type    3. Varicose veins of right lower extremity with inflammation    Other orders  -     ciprofloxacin HCl (CILOXAN) 0.3 % ophthalmic solution; Place 1 drop into the right eye every 2 (two) hours. for 4 days  Dispense: 5 mL; Refill: 0       Holdeolum  Patient Instructions   Continue WARM compresses. Drops while awake.  Cool compresses for comfort and take zyrtec in the morning. Benadryl at night for itch.   Cool compresses for the right thigh. Alternate with heat and massage.   Conjunctivitis rx as above  Varicosity right thigh. Heat and cold.     No follow-ups on file.    ________________________________________________________________  ________________________________________________________________      Chief Complaint   Patient presents with    Belepharitis     History of present illness    This 64 y.o. presents today for complaint of irritation right eye. This is the second day now. Swelling of the upper lid and a nodule.  She thought that it was a stye so she use a warm compresses.  She has had some itching so she tried a cool compress which was helpful.  Patient denies any rash.  Does note active drainage from the eye.  Did have some crusting this morning.  She tried an allergy drop also with did not improve.  Does note the history of a cancer below the right eye lid.  She seeking reassurance partly due to that history.    Notes a varicosity on right thigh.  She it showed it to Dermatology but remembers him calling it something different.  Patient has discomfort which she knows percussion or massage seems to relieve.  No lower extremity swelling.      Past Medical History:   Diagnosis Date    Cancer     breast    Ductal carcinoma in situ (DCIS) of left  breast 2017    Hypertension     Mass of left axilla     Use of tamoxifen (Nolvadex) 2017       Past Surgical History:   Procedure Laterality Date    BREAST SURGERY Left 2017    Lumpectomy     SECTION      MYOMECTOMY         Family History   Problem Relation Name Age of Onset    Stroke Mother      No Known Problems Father      Stroke Maternal Grandmother         Social History     Socioeconomic History    Marital status:    Tobacco Use    Smoking status: Never    Smokeless tobacco: Never   Substance and Sexual Activity    Alcohol use: No    Drug use: No     Social Drivers of Health     Financial Resource Strain: Low Risk  (3/4/2024)    Overall Financial Resource Strain (CARDIA)     Difficulty of Paying Living Expenses: Not hard at all   Food Insecurity: No Food Insecurity (3/4/2024)    Hunger Vital Sign     Worried About Running Out of Food in the Last Year: Never true     Ran Out of Food in the Last Year: Never true   Transportation Needs: No Transportation Needs (3/4/2024)    PRAPARE - Transportation     Lack of Transportation (Medical): No     Lack of Transportation (Non-Medical): No   Physical Activity: Inactive (3/4/2024)    Exercise Vital Sign     Days of Exercise per Week: 0 days     Minutes of Exercise per Session: 0 min   Stress: No Stress Concern Present (3/4/2024)    [employer] - Occupational Stress Questionnaire     Feeling of Stress : Not at all   Housing Stability: Low Risk  (3/4/2024)    Housing Stability Vital Sign     Unable to Pay for Housing in the Last Year: No     Number of Places Lived in the Last Year: 0     Unstable Housing in the Last Year: No       Current Outpatient Medications   Medication Sig Dispense Refill    calcium carbonate-vit D3-min 600 mg-10 mcg (400 unit) Tab Take 1 tablet by mouth once daily. for 365 doses 90 tablet 3    cetirizine (ZYRTEC) 10 MG tablet Take 1 tablet (10 mg total) by mouth once daily. 30 tablet 2     "enalapril (VASOTEC) 10 MG tablet Take 1 tablet (10 mg total) by mouth once daily. 90 tablet 0    ibandronate (BONIVA) 150 mg tablet Take 1 tablet (150 mg total) by mouth every 30 days. 1 tablet 11    nystatin (MYCOSTATIN) cream Apply topically 2 (two) times daily. 15 g 1    rosuvastatin (CRESTOR) 10 MG tablet Take 1 tablet (10 mg total) by mouth every evening. 30 tablet 5    chlorhexidine (PERIDEX) 0.12 % solution rinse and spit with one ounce every night at bedtime 473 mL 0    ciprofloxacin HCl (CILOXAN) 0.3 % ophthalmic solution Place 1 drop into the right eye every 2 (two) hours. for 4 days 5 mL 0    fluticasone propionate (FLONASE) 50 mcg/actuation nasal spray 1 spray (50 mcg total) by Each Nostril route once daily. 16 g 11    ibuprofen (ADVIL,MOTRIN) 800 MG tablet Take 1 tablet (800 mg total) by mouth every 6 to 8 hours as needed. 30 tablet 0    mometasone 0.1% (ELOCON) 0.1 % cream Apply topically to affected area(s) once daily for 10 days 45 g 3    nystatin-triamcinolone (MYCOLOG) ointment Apply topically 2 (two) times daily for 14 days 30 g 2     No current facility-administered medications for this visit.       Review of patient's allergies indicates:   Allergen Reactions    Bactrim [sulfamethoxazole-trimethoprim] Hives and Itching    Cherries     Tetracyclines        Physical examination  Vitals Reviewed  /77 (BP Location: Right arm, Patient Position: Sitting)   Pulse 81   Temp 98.4 °F (36.9 °C) (Oral)   Ht 5' 3" (1.6 m)   Wt 81.5 kg (179 lb 11.2 oz)   SpO2 96%   BMI 31.83 kg/m²  Body mass index is 31.83 kg/m².     BP Readings from Last 3 Encounters:   01/28/25 130/77   10/14/24 135/89   09/27/24 (!) 176/92       Wt Readings from Last 3 Encounters:   01/28/25 81.5 kg (179 lb 11.2 oz)   10/14/24 80 kg (176 lb 5.9 oz)   10/08/24 79.8 kg (176 lb)     Gen. Well-dressed well-nourished   Skin warm dry and intact.  No rashes noted.  HEENT.   Nares patent bilateral.    Right high upper lid swelling and " red.  No rash or blistering noted.  No active drainage.  MILD photophobia noted.  No pain with eye movement.  Neck is supple without adenopathy  Chest.  Respirations are even unlabored.  Lungs are clear to auscultation.  Cardiac regular rate and rhythm.  No chest wall adenopathy noted.  Neuro. Awake alert oriented x4.  Normal judgment and cognition noted.  Extremities no clubbing cyanosis or edema noted. Superficial Varicosity right lateral thigh 3 small vessels.  No erythematous changes or localized warmth palpable.    Call or return to clinic prn if these symptoms worsen or fail to improve as anticipated.

## 2025-01-28 NOTE — PATIENT INSTRUCTIONS
Continue WARM compresses. Drops while awake.  Cool compresses for comfort and take zyrtec in the morning. Benadryl at night for itch.   Cool compresses for the right thigh. Alternate with heat and massage.

## 2025-02-18 ENCOUNTER — OFFICE VISIT (OUTPATIENT)
Dept: CARDIOLOGY | Facility: CLINIC | Age: 65
End: 2025-02-18
Payer: COMMERCIAL

## 2025-02-18 VITALS
DIASTOLIC BLOOD PRESSURE: 93 MMHG | BODY MASS INDEX: 32.03 KG/M2 | HEART RATE: 68 BPM | SYSTOLIC BLOOD PRESSURE: 136 MMHG | WEIGHT: 180.75 LBS | HEIGHT: 63 IN

## 2025-02-18 DIAGNOSIS — E78.2 MIXED HYPERLIPIDEMIA: ICD-10-CM

## 2025-02-18 DIAGNOSIS — I10 HYPERTENSION, UNSPECIFIED TYPE: Primary | ICD-10-CM

## 2025-02-18 NOTE — PROGRESS NOTES
Subjective:    Patient ID:  Catrachita Johnson is a 64 y.o. female patient here for evaluation Hypertension    2025:   Home BP log reviewed.  Blood pressure is well controlled.  Denies any symptoms.    Follow up visit  10/14/24:  Denies any new symptoms.  Compliant with medications.  BP mildly elevated in the morning however low-normal in the evening.  She uses wrist blood pressure cuff.  Following diet    History of Present Illness during initial clinic visit:     54-year-old female referred here from primary care.  Patient with a past medical history hypertension.  Dyslipidemia on recent blood work not on any medication.  History of breast cancer status post surgery and radiation therapy.  Denies any exertional anginal symptoms at baseline.  Referred for evaluation of palpitations per referral however patient denies any palpitations. blood  Pressure fluctuating at home.  Sometimes normal ,sometimes systolic blood pressure elevated up to 160.         Review of patient's allergies indicates:   Allergen Reactions    Bactrim [sulfamethoxazole-trimethoprim] Hives and Itching    Cherries     Tetracyclines        Past Medical History:   Diagnosis Date    Cancer     breast    Ductal carcinoma in situ (DCIS) of left breast 2017    Hypertension     Mass of left axilla     Use of tamoxifen (Nolvadex) 2017     Past Surgical History:   Procedure Laterality Date    BREAST SURGERY Left 2017    Lumpectomy     SECTION      MYOMECTOMY       Social History     Tobacco Use    Smoking status: Never    Smokeless tobacco: Never   Substance Use Topics    Alcohol use: No    Drug use: No        Review of Systems   Negative except as mentioned in HPI         Objective        Vitals:    25 1457   BP: (!) 136/93   Pulse: 68       LIPIDS - LAST 2   Lab Results   Component Value Date    CHOL 230 (H) 2024    HDL 48 2024    LDLCALC 154.6 2024    TRIG 137 2024    CHOLHDL 20.9  "09/16/2024       CBC - LAST 2  Lab Results   Component Value Date    WBC 5.1 09/04/2024    WBC 4.6 02/29/2024    RBC 4.28 09/04/2024    RBC 4.09 02/29/2024    HGB 12.3 09/04/2024    HGB 11.6 (L) 02/29/2024    HCT 38.7 09/04/2024    HCT 35.9 02/29/2024    MCV 90.4 09/04/2024    MCV 87.8 02/29/2024    MCH 28.7 09/04/2024    MCH 28.4 02/29/2024    MCHC 31.8 (L) 09/04/2024    MCHC 32.3 02/29/2024    RDW 13.7 09/04/2024    RDW 13.4 02/29/2024     09/04/2024     02/29/2024    MPV 9.6 09/04/2024    MPV 9.5 02/29/2024    GRAN 64 12/28/2017    GRAN 62.2 09/08/2017    GRAN 3.4 09/08/2017    LYMPH 1,719 09/04/2024    LYMPH 33.7 09/04/2024    MONO 337 09/04/2024    MONO 6.6 09/04/2024    BASO 31 09/04/2024    BASO 32 02/29/2024       CHEMISTRY & LIVER FUNCTION - LAST 2  Lab Results   Component Value Date     09/04/2024     02/29/2024    K 4.4 09/04/2024    K 4.1 02/29/2024     09/04/2024     02/29/2024    CO2 28 09/04/2024    CO2 27 02/29/2024    BUN 11 09/04/2024    BUN 12 02/29/2024    CREATININE 0.81 09/04/2024    CREATININE 0.80 02/29/2024    GLU 86 09/04/2024    GLU 94 02/29/2024    CALCIUM 9.2 09/04/2024    CALCIUM 9.5 02/29/2024    ALBUMIN 4.2 09/04/2024    ALBUMIN 4.3 02/29/2024    PROT 7.1 09/04/2024    PROT 7.2 02/29/2024    ALKPHOS 62 07/30/2020    ALKPHOS 73 01/20/2020    ALT 23 09/04/2024    ALT 19 02/29/2024    AST 26 09/04/2024    AST 19 02/29/2024    BILITOT 0.4 09/04/2024    BILITOT 0.4 02/29/2024        CARDIAC PROFILE - LAST 2  No results found for: "BNP", "CPK", "CPKMB", "LDH", "TROPONINI", "TROPONINIHS"     COAGULATION - LAST 2  No results found for: "LABPT", "INR", "APTT"    ENDOCRINE & PSA - LAST 2  Lab Results   Component Value Date    HGBA1C 5.4 09/16/2024        ECHOCARDIOGRAM RESULTS  No results found for this or any previous visit.      CURRENT/PREVIOUS VISIT EKG  Results for orders placed or performed in visit on 09/27/24   IN OFFICE EKG 12-LEAD (to Muse)    " Collection Time: 09/27/24 11:32 AM   Result Value Ref Range    QRS Duration 94 ms    [facility] QTC Calculation 428 ms    Narrative    Test Reason : I10,    Vent. Rate : 061 BPM     Atrial Rate : 061 BPM     P-R Int : 190 ms          QRS Dur : 094 ms      QT Int : 426 ms       P-R-T Axes : 025 -31 008 degrees     QTc Int : 428 ms    Normal sinus rhythm  Left axis deviation  Abnormal ECG  No previous ECGs available  Confirmed by Bekah VALDES, Debbi Gonzales ([id number]) on 9/27/2024 4:23:27 PM    Referred By: ANJU OH           Confirmed By:Debbi Godfrey MD     No valid procedures specified.   No results found for this or any previous visit.    No valid procedures specified.          PREVIOUS STRESS TEST              PREVIOUS ANGIOGRAM        PHYSICAL EXAM    CONSTITUTIONAL: Well built, well nourished in no apparent distress  HEENT: No pallor  NECK: no JVD  LUNGS: CTA b/l  HEART: regular rate and rhythm, S1, S2 normal, no murmur   ABDOMEN: nondistended   EXTREMITIES: No edema  NEURO: AAO X 3   SKIN:  No rash  Psych:  Normal affect    I HAVE REVIEWED :    The vital signs, nurses notes, and all the pertinent radiology and labs.        Current Outpatient Medications   Medication Instructions    calcium carbonate-vit D3-min 600 mg-10 mcg (400 unit) Tab 1 tablet, Oral, Daily    cetirizine (ZYRTEC) 10 mg, Oral, Daily    chlorhexidine (PERIDEX) 0.12 % solution rinse and spit with one ounce every night at bedtime    enalapril (VASOTEC) 10 mg, Oral, Daily    fluticasone propionate (FLONASE) 50 mcg, Each Nostril, Daily    ibandronate (BONIVA) 150 mg, Oral, Every 30 days    ibuprofen (ADVIL,MOTRIN) 800 mg, Oral, Every 6-8 hours PRN    mometasone 0.1% (ELOCON) 0.1 % cream Apply topically to affected area(s) once daily for 10 days    nystatin (MYCOSTATIN) cream Topical (Top), 2 times daily    nystatin-triamcinolone (MYCOLOG) ointment Apply topically 2 (two) times daily for 14 days    rosuvastatin (CRESTOR) 10 mg, Oral, Nightly      Assessment & Plan     HTN- labile- blood pressure is well controlled after splitting the enalapril dosage into 0.5 tabs b.i.d..  DLD    No anginal symptoms  Clinically euvolemic  Preserved ejection fraction on echo.  No major valvular heart disease.    Plan:  Continue enalapril 10mg  ( 0.5 tab b.i.d.).  Continue home BP monitoring  Continue Crestor 10 mg q.h.s..  Repeat blood work pending.  Advised to recheck lipid profile and LFTs.  Follow up with PCP      Follow up in about 4 months (around 6/18/2025).

## 2025-02-21 ENCOUNTER — PATIENT MESSAGE (OUTPATIENT)
Dept: ADMINISTRATIVE | Facility: HOSPITAL | Age: 65
End: 2025-02-21
Payer: COMMERCIAL

## 2025-03-10 ENCOUNTER — PATIENT MESSAGE (OUTPATIENT)
Dept: ADMINISTRATIVE | Facility: HOSPITAL | Age: 65
End: 2025-03-10
Payer: COMMERCIAL

## 2025-03-17 ENCOUNTER — LAB VISIT (OUTPATIENT)
Dept: LAB | Facility: HOSPITAL | Age: 65
End: 2025-03-17
Attending: FAMILY MEDICINE
Payer: COMMERCIAL

## 2025-03-17 ENCOUNTER — OFFICE VISIT (OUTPATIENT)
Dept: FAMILY MEDICINE | Facility: CLINIC | Age: 65
End: 2025-03-17
Payer: COMMERCIAL

## 2025-03-17 VITALS
DIASTOLIC BLOOD PRESSURE: 86 MMHG | OXYGEN SATURATION: 99 % | SYSTOLIC BLOOD PRESSURE: 130 MMHG | TEMPERATURE: 98 F | RESPIRATION RATE: 17 BRPM | BODY MASS INDEX: 32.43 KG/M2 | HEIGHT: 63 IN | HEART RATE: 67 BPM | WEIGHT: 183 LBS

## 2025-03-17 DIAGNOSIS — I10 ESSENTIAL HYPERTENSION: ICD-10-CM

## 2025-03-17 DIAGNOSIS — Z00.00 PREVENTATIVE HEALTH CARE: ICD-10-CM

## 2025-03-17 DIAGNOSIS — M85.89 OSTEOPENIA OF MULTIPLE SITES: ICD-10-CM

## 2025-03-17 DIAGNOSIS — Z12.11 COLON CANCER SCREENING: Primary | ICD-10-CM

## 2025-03-17 PROBLEM — M85.80 OSTEOPENIA: Status: ACTIVE | Noted: 2025-03-17

## 2025-03-17 LAB
ESTIMATED AVG GLUCOSE: 114 MG/DL (ref 68–131)
HBA1C MFR BLD: 5.6 % (ref 4–5.6)
HCV AB SERPL QL IA: NORMAL
HIV 1+2 AB+HIV1 P24 AG SERPL QL IA: NORMAL

## 2025-03-17 PROCEDURE — 87389 HIV-1 AG W/HIV-1&-2 AB AG IA: CPT | Performed by: FAMILY MEDICINE

## 2025-03-17 PROCEDURE — 3008F BODY MASS INDEX DOCD: CPT | Mod: CPTII,S$GLB,, | Performed by: FAMILY MEDICINE

## 2025-03-17 PROCEDURE — 83036 HEMOGLOBIN GLYCOSYLATED A1C: CPT | Performed by: FAMILY MEDICINE

## 2025-03-17 PROCEDURE — 86803 HEPATITIS C AB TEST: CPT | Performed by: FAMILY MEDICINE

## 2025-03-17 PROCEDURE — 3079F DIAST BP 80-89 MM HG: CPT | Mod: CPTII,S$GLB,, | Performed by: FAMILY MEDICINE

## 2025-03-17 PROCEDURE — 36415 COLL VENOUS BLD VENIPUNCTURE: CPT | Performed by: FAMILY MEDICINE

## 2025-03-17 PROCEDURE — 1126F AMNT PAIN NOTED NONE PRSNT: CPT | Mod: CPTII,S$GLB,, | Performed by: FAMILY MEDICINE

## 2025-03-17 PROCEDURE — 99214 OFFICE O/P EST MOD 30 MIN: CPT | Mod: S$GLB,,, | Performed by: FAMILY MEDICINE

## 2025-03-17 PROCEDURE — 1101F PT FALLS ASSESS-DOCD LE1/YR: CPT | Mod: CPTII,S$GLB,, | Performed by: FAMILY MEDICINE

## 2025-03-17 PROCEDURE — 1159F MED LIST DOCD IN RCRD: CPT | Mod: CPTII,S$GLB,, | Performed by: FAMILY MEDICINE

## 2025-03-17 PROCEDURE — 99999 PR PBB SHADOW E&M-EST. PATIENT-LVL IV: CPT | Mod: PBBFAC,,, | Performed by: FAMILY MEDICINE

## 2025-03-17 PROCEDURE — 3288F FALL RISK ASSESSMENT DOCD: CPT | Mod: CPTII,S$GLB,, | Performed by: FAMILY MEDICINE

## 2025-03-17 PROCEDURE — 3075F SYST BP GE 130 - 139MM HG: CPT | Mod: CPTII,S$GLB,, | Performed by: FAMILY MEDICINE

## 2025-03-17 NOTE — PROGRESS NOTES
Patient pre-assessment complete for Loop extraction with Dr Marylee Guan scheduled for 18 at 9:30am, arrival time 8:30am. Patient verified using . Patient instructed to bring all home medications in labeled bottles on the day of procedure. NPO status reinforced. Patient instructed to [address] [facility] in am . Instructed they can take all other medications excluding vitamins & supplements. Patient verbalizes understanding of all instructions & denies any questions at this time. Subjective:       Patient ID: Catrachita Johnson is a 64 y.o. female.    Chief Complaint: Follow-up (6) and Annual Exam (6 month dm, chol)      History of Present Illness    CHIEF COMPLAINT:   presents for a follow-up on cholesterol and diabetes, and to discuss recent lab results.    HPI:   had blood drawn on February 24th for cholesterol and diabetes follow-up, but the results were not initially available in the EPIC system.  reports forgetting to take her blood pressure medication this morning. She had high blood pressure readings in February, with a reading of 126/75 at an urgent care visit.     reports a recent outbreak of a pruritic rash on both arms and leg, which she describes as an extensive eczema-like condition. She was evaluated at an urgent care facility and prescribed Triamcinolone cream. The cream provided immediate relief, eliminating the itching. She has been using the cream since the previous Friday (approximately 5 days). The rash is now resolving, leaving behind some hyperpigmentation from scratching.     mentions seasonal allergies, which she expects to continue until the end of May due to blooming plants and pollen. She reports working in the garden and doing weeding, which may exacerbate her allergies.     denies having diabetes or osteoporosis, stating she only has mild osteopenia.    MEDICATIONS:   is on Crestor (rosuvastatin) daily in the evening for high cholesterol, which she started in September. She started Boniva (ibandronate) in October, taken once monthly for osteopenia prevention, to be continued for 5 years. She is also taking a Vitamin D supplement, calcium supplement, and eye vitamins.  is on some blood pressure medication.    MEDICAL HISTORY:   has a history of osteopenia, hypercholesterolemia, hypertension, and eczema.    FAMILY HISTORY:  Family history is significant for father  with Hepatitis C, diagnosed in the late 70s or early 80s. It was likely contracted from a blood transfusion during an appendectomy in 1940.    TEST RESULTS:  On February 24th, the patient's cholesterol tests showed a total of 165, with LDL at 77 and a cholesterol to HDL ratio of 2.4. Liver function tests conducted on the same date revealed normal protein and bilirubin levels.  underwent a Cologuard test in January 2022, and the results were good.    ALLERGIES:   is allergic to pollen, which causes itching and a rash with eczema-like symptoms on her arms and legs.    SOCIAL HISTORY:  Denies smoking Occupation: Employed    Marital status:       ROS:  Integumentary: +rash, +itching          Allergies and Medications:   Review of patient's allergies indicates:   Allergen Reactions    Bactrim [sulfamethoxazole-trimethoprim] Hives and Itching    Cherries     Tetracyclines      Current Medications[1]    Family History:   Family History   Problem Relation Name Age of Onset    Stroke Mother      No Known Problems Father      Stroke Maternal Grandmother       Lab Results   Component Value Date    WBC 5.1 09/04/2024    HGB 12.3 09/04/2024    HCT 38.7 09/04/2024     09/04/2024    CHOL 230 (H) 09/16/2024    TRIG 137 09/16/2024    HDL 48 09/16/2024    ALT 23 09/04/2024    AST 26 09/04/2024     09/04/2024    K 4.4 09/04/2024     09/04/2024    CREATININE 0.81 09/04/2024    BUN 11 09/04/2024    CO2 28 09/04/2024    HGBA1C 5.4 09/16/2024     Lab Results   Component Value Date    CHOL 230 (H) 09/16/2024     Lab Results   Component Value Date    HDL 48 09/16/2024     Lab Results   Component Value Date    LDLCALC 154.6 09/16/2024     Lab Results   Component Value Date    TRIG 137 09/16/2024       Lab Results   Component Value Date    CHOLHDL 20.9 09/16/2024             Social History:   Social History[2]        Objective:     Vitals:    03/17/25 1009   BP: 130/86   Pulse:    Resp:    Temp:       BP Readings from Last 3 Encounters:   03/17/25 130/86   02/18/25 (!) 136/93   01/28/25 130/77          Physical Exam    General: No acute distress. Well-developed. Well-nourished. Extremely asymmetrically developed.  Eyes: EOMI. Sclerae anicteric.  HENT: Normocephalic. Atraumatic. Nares patent. Moist oral mucosa.  Cardiovascular: Regular rate. Regular rhythm. No murmurs. No rubs. No gallops. Normal S1, S2. Heart rate over the rate without perma gallop or breath. PMI on the left neoclavicular line.  Respiratory: Normal respiratory effort. Clear to auscultation bilaterally. No rales. No rhonchi. No wheezing.  Musculoskeletal: No  obvious deformity.  Extremities: No lower extremity edema.  Neurological: Alert & oriented x3. No slurred speech. Normal gait.  Psychiatric: Normal mood. Normal affect. Good insight. Good judgment.  Skin: Warm. Dry. Resolving pruritic rash on both arms with eczema and hyperpigmentation.            Assessment:       1. Colon cancer screening    2. Preventative health care    3. Osteopenia of multiple sites    4. Essential hypertension        Plan:       Assessment & Plan    IMPRESSION:  - Reviewed recent [facility] lab results: cholesterol 165, LDL 77, normal liver function tests.  - Assessed blood pressure: elevated at 130/86, likely due to not taking medication that morning.  - Evaluated resolving pruritic rash on arms, currently treated with Triamcinolone cream.  - Considered ongoing management of osteopenia, previously diagnosed and treated with Boniva.  - Will check A1C along with other labs, despite no current evidence of diabetes or prediabetes. Determined to continue annual A1C screening while patient remains on commercial insurance.    HYPERTENSION:  - Monitored the patient's blood pressure, which was high in February and remains elevated today.  - Noted that the patient forgot to take blood pressure medication this morning.  - Measured blood pressure at 130/86 during the visit.  -  Acknowledged hypertension as the primary diagnosis for today's visit.  - Discussed the silent nature of hypertension, emphasizing the need for regular screening.  - Instructed the patient to take blood pressure medication daily with water, even on days when fasting for labs.  - Emphasized the importance of taking blood pressure medication with water on the morning of labs, while avoiding food.    HYPERLIPIDEMIA:  - Evaluated the patient's cholesterol levels from [facility] labs: total cholesterol 165, LDL 77, with a cholesterol to HDL ratio of 2.4.  - Assessed the cholesterol levels as good, noting previous high LDL in prior labs.  - Continued Crestor (rosuvastatin) daily in the evening for cholesterol management.    OSTEOPENIA:  - Evaluated the patient's condition as osteopenia, which is just barely weak bones, not osteoporosis.  - Added osteopenia to the patient's diagnosis list.  - Discussed the silent nature of bone density loss, emphasizing the need for regular screening.  - Continued Boniva (ibandronate) once monthly for osteopenia treatment.  - Continued calcium and vitamin D supplements for bone health.  - Recommend weight-bearing exercises as part of the treatment plan.  - Planned to continue the current treatment regimen for 5 years.  -  to continue weight-bearing exercises like walking, running, squats, and lunges to help maintain bone strength.  - Recommend incorporating yoga as a beneficial form of exercise.    DERMATITIS AND HYPERPIGMENTATION:  - Monitored the patient's report of a rash on both arms and leg, previously diagnosed as eczema at urgent care.  - Examined the patient, revealing a resolving pruritic rash on both arms with some eczema and hyperpigmentation.  - Informed about the potency of Triamcinolone cream and its potential side effects.  - Advised to decrease Triamcinolone cream use and stop after 2 weeks of use or when itching subsides.  - Monitored the patient's report of black  marks left from scratching the rash.  - Examined the patient, revealing hyperpigmentation on the arms.  - Informed about potential side effects of Triamcinolone cream, including skin pigment fading and splotchiness.    ALLERGIC RHINITIS:  - Educated the patient on the seasonal nature of allergies, explaining they may persist until the end of May due to blooming plants.    PREDIABETES SCREENING:  - Evaluated previous test results, confirming that the patient is not diabetic or pre-diabetic.  - Discussed the silent nature of prediabetes, emphasizing the need for regular screening.  - Ordered A1C test along with other labs.  - Planned to continue annual A1C screening.    FAMILY HISTORY OF INFECTIOUS DISEASES:  - Noted the patient's family history, mentioning that father had Hepatitis C, discover  ed through screening in his late 70s or early 80s.  - Planned to order Hepatitis C and HIV screening tests for the patient.    FOLLOW-UP AND SCREENING:  - Ordered Hepatitis C and HIV screening tests, and Cologuard test for colorectal cancer screening.  - Follow up in 3 years for next Cologuard test if current test is negative.  - Contact office if any issues arise with prescribed treatments or if new symptoms develop.        Catrachita was seen today for follow-up and annual exam.    Diagnoses and all orders for this visit:    Colon cancer screening  -     Cologuard Screening (Multitarget Stool DNA); Future  -     Cologuard Screening (Multitarget Stool DNA)    Preventative health care  -     HIV 1/2 Ag/Ab (4th Gen); Future  -     Hepatitis C Antibody; Future  -     Hemoglobin A1C; Future    Osteopenia of multiple sites    Essential hypertension         Follow up in about 6 months (around 9/17/2025).  This note was generated with the assistance of ambient listening technology. Verbal consent was obtained by the patient and accompanying visitor(s) for the recording of patient appointment to facilitate this note. I attest to having  reviewed and edited the generated note for accuracy, though some syntax or spelling errors may persist. Please contact the author of this note for any clarification.            [1]   Current Outpatient Medications   Medication Sig Dispense Refill    calcium carbonate-vit D3-min 600 mg-10 mcg (400 unit) Tab Take 1 tablet by mouth once daily. for 365 doses 90 tablet 3    cetirizine (ZYRTEC) 10 MG tablet Take 1 tablet (10 mg total) by mouth once daily. 30 tablet 2    enalapril (VASOTEC) 10 MG tablet Take 1 tablet (10 mg total) by mouth once daily. 90 tablet 0    fluticasone propionate (FLONASE) 50 mcg/actuation nasal spray 1 spray (50 mcg total) by Each Nostril route once daily. 16 g 11    ibandronate (BONIVA) 150 mg tablet Take 1 tablet (150 mg total) by mouth every 30 days. 1 tablet 11    mometasone 0.1% (ELOCON) 0.1 % cream Apply topically to affected area(s) once daily for 10 days 45 g 3    nystatin (MYCOSTATIN) cream Apply topically 2 (two) times daily. 15 g 1    nystatin-triamcinolone (MYCOLOG) ointment Apply topically 2 (two) times daily for 14 days 30 g 2    rosuvastatin (CRESTOR) 10 MG tablet Take 1 tablet (10 mg total) by mouth every evening. 30 tablet 5    triamcinolone acetonide 0.1% (KENALOG) 0.1 % cream Take 1 Applicator (topical) 2 times per day 80 g 1    chlorhexidine (PERIDEX) 0.12 % solution rinse and spit with one ounce every night at bedtime 473 mL 0     No current facility-administered medications for this visit.   [2]   Social History  Socioeconomic History    Marital status:    Tobacco Use    Smoking status: Never    Smokeless tobacco: Never   Substance and Sexual Activity    Alcohol use: No    Drug use: No     Social Drivers of Health     Financial Resource Strain: Low Risk  (3/4/2024)    Overall Financial Resource Strain (CARDIA)     Difficulty of Paying Living Expenses: Not hard at all   Food Insecurity: No Food Insecurity (3/4/2024)    Hunger Vital Sign     Worried About Running Out of  Food in the Last Year: Never true     Ran Out of Food in the Last Year: Never true   Transportation Needs: No Transportation Needs (3/4/2024)    PRAPARE - Transportation     Lack of Transportation (Medical): No     Lack of Transportation (Non-Medical): No   Physical Activity: Inactive (3/4/2024)    Exercise Vital Sign     Days of Exercise per Week: 0 days     Minutes of Exercise per Session: 0 min   Stress: No Stress Concern Present (3/4/2024)    [employer] - Occupational Stress Questionnaire     Feeling of Stress : Not at all   Housing Stability: Low Risk  (3/4/2024)    Housing Stability Vital Sign     Unable to Pay for Housing in the Last Year: No     Number of Places Lived in the Last Year: 0     Unstable Housing in the Last Year: No

## 2025-03-18 ENCOUNTER — TELEPHONE (OUTPATIENT)
Dept: FAMILY MEDICINE | Facility: CLINIC | Age: 65
End: 2025-03-18
Payer: COMMERCIAL

## 2025-03-18 ENCOUNTER — RESULTS FOLLOW-UP (OUTPATIENT)
Dept: FAMILY MEDICINE | Facility: CLINIC | Age: 65
End: 2025-03-18

## 2025-03-18 NOTE — TELEPHONE ENCOUNTER
----- Message from Jean-Pierre Waller MD sent at 3/18/2025  8:04 AM CDT -----  Results Ok, notify patient.  ----- Message -----  From: Anibal, Busy [facility] Interface  Sent: 3/17/2025   8:42 PM CDT  To: Jean-Pierre Waller MD

## 2025-03-19 DIAGNOSIS — I10 HYPERTENSION, UNSPECIFIED TYPE: ICD-10-CM

## 2025-03-19 RX ORDER — ENALAPRIL MALEATE 10 MG/1
10 TABLET ORAL DAILY
Qty: 90 TABLET | Refills: 1 | Status: SHIPPED | OUTPATIENT
Start: 2025-03-19

## 2025-03-19 NOTE — TELEPHONE ENCOUNTER
Refill Routing Note   Medication(s) are not appropriate for processing by [facility] for the following reason(s):        Non-participating provider    [facility] action(s):  Route               Appointments  past 12m or future 3m with PCP    Date Provider   Last Visit   7/31/2023 Paul Nguyen FNP-C   Next Visit   Visit date not found Paul Nguyen FNP-C   ED visits in past 90 days: 0        Note composed:11:42 AM 03/19/2025

## 2025-03-24 ENCOUNTER — PATIENT MESSAGE (OUTPATIENT)
Dept: FAMILY MEDICINE | Facility: CLINIC | Age: 65
End: 2025-03-24
Payer: COMMERCIAL

## 2025-03-25 ENCOUNTER — PATIENT OUTREACH (OUTPATIENT)
Dept: ADMINISTRATIVE | Facility: HOSPITAL | Age: 65
End: 2025-03-25
Payer: COMMERCIAL

## 2025-03-25 NOTE — PROGRESS NOTES
Population Health Chart Review & Patient Outreach Details      Additional [facility] Health Notes:    The patient did state that she has completed the Cologuard and mailed it back via [employer].           Updates Requested / Reviewed:      Updated Care Coordination Note, Care Everywhere, and Immunizations Reconciliation Completed or Queried: [facility] Topics Overdue:      [facility] Score: 1     Colon Cancer Screening                       Health Maintenance Topic(s) Outreach Outcomes & Actions Taken:    Colorectal Cancer Screening - Outreach Outcomes & Actions Taken  : Reminded Patient to Complete Already Received Home Test

## 2025-03-28 ENCOUNTER — TELEPHONE (OUTPATIENT)
Dept: FAMILY MEDICINE | Facility: CLINIC | Age: 65
End: 2025-03-28
Payer: COMMERCIAL

## 2025-03-28 NOTE — TELEPHONE ENCOUNTER
----- Message from Jean-Pierre Waller MD sent at 3/27/2025  4:55 PM CDT -----  Results Ok, notify patient.  ----- Message -----  From: Caio Hernandez  Sent: 3/27/2025   1:15 PM CDT  To: Jean-Pierre Waller MD

## 2025-03-31 RX ORDER — ROSUVASTATIN CALCIUM 10 MG/1
10 TABLET, COATED ORAL NIGHTLY
Qty: 30 TABLET | Refills: 5 | Status: SHIPPED | OUTPATIENT
Start: 2025-03-31 | End: 2025-09-27

## 2025-04-02 ENCOUNTER — PATIENT OUTREACH (OUTPATIENT)
Dept: ADMINISTRATIVE | Facility: HOSPITAL | Age: 65
End: 2025-04-02
Payer: COMMERCIAL

## 2025-04-08 ENCOUNTER — TELEPHONE (OUTPATIENT)
Dept: FAMILY MEDICINE | Facility: CLINIC | Age: 65
End: 2025-04-08
Payer: COMMERCIAL

## 2025-04-08 NOTE — TELEPHONE ENCOUNTER
----- Message from Marely sent at 4/8/2025 12:58 PM CDT -----  [phone]

## 2025-04-10 DIAGNOSIS — M85.88 OSTEOPENIA OF LUMBAR SPINE: ICD-10-CM

## 2025-04-10 RX ORDER — MULTIVITAMIN
1 TABLET ORAL DAILY
Qty: 90 TABLET | Refills: 3 | Status: SHIPPED | OUTPATIENT
Start: 2025-04-10 | End: 2026-04-10

## 2025-04-10 NOTE — TELEPHONE ENCOUNTER
Patient would like for you to call the calcium cabonate into [facility] because [facility] condont handle the PA for it

## 2025-04-10 NOTE — TELEPHONE ENCOUNTER
----- Message from Marely sent at 4/10/2025 10:21 AM CDT -----  - 9:23- pt got the message about the prior auth and [facility] is still saying it is not covered [phone]

## 2025-08-12 ENCOUNTER — OFFICE VISIT (OUTPATIENT)
Dept: CARDIOLOGY | Facility: CLINIC | Age: 65
End: 2025-08-12
Payer: COMMERCIAL

## 2025-08-12 VITALS
DIASTOLIC BLOOD PRESSURE: 85 MMHG | HEART RATE: 74 BPM | BODY MASS INDEX: 31.25 KG/M2 | SYSTOLIC BLOOD PRESSURE: 129 MMHG | WEIGHT: 176.38 LBS | HEIGHT: 63 IN

## 2025-08-12 DIAGNOSIS — I10 HYPERTENSION, UNSPECIFIED TYPE: ICD-10-CM

## 2025-08-12 DIAGNOSIS — E78.2 MIXED HYPERLIPIDEMIA: Primary | ICD-10-CM

## 2025-08-12 PROCEDURE — 4010F ACE/ARB THERAPY RXD/TAKEN: CPT | Mod: CPTII,S$GLB,, | Performed by: INTERNAL MEDICINE

## 2025-08-12 PROCEDURE — 1126F AMNT PAIN NOTED NONE PRSNT: CPT | Mod: CPTII,S$GLB,, | Performed by: INTERNAL MEDICINE

## 2025-08-12 PROCEDURE — 3074F SYST BP LT 130 MM HG: CPT | Mod: CPTII,S$GLB,, | Performed by: INTERNAL MEDICINE

## 2025-08-12 PROCEDURE — 1159F MED LIST DOCD IN RCRD: CPT | Mod: CPTII,S$GLB,, | Performed by: INTERNAL MEDICINE

## 2025-08-12 PROCEDURE — 1101F PT FALLS ASSESS-DOCD LE1/YR: CPT | Mod: CPTII,S$GLB,, | Performed by: INTERNAL MEDICINE

## 2025-08-12 PROCEDURE — 3288F FALL RISK ASSESSMENT DOCD: CPT | Mod: CPTII,S$GLB,, | Performed by: INTERNAL MEDICINE

## 2025-08-12 PROCEDURE — 99999 PR PBB SHADOW E&M-EST. PATIENT-LVL III: CPT | Mod: PBBFAC,,, | Performed by: INTERNAL MEDICINE

## 2025-08-12 PROCEDURE — 3044F HG A1C LEVEL LT 7.0%: CPT | Mod: CPTII,S$GLB,, | Performed by: INTERNAL MEDICINE

## 2025-08-12 PROCEDURE — 3008F BODY MASS INDEX DOCD: CPT | Mod: CPTII,S$GLB,, | Performed by: INTERNAL MEDICINE

## 2025-08-12 PROCEDURE — 99214 OFFICE O/P EST MOD 30 MIN: CPT | Mod: S$GLB,,, | Performed by: INTERNAL MEDICINE

## 2025-08-12 PROCEDURE — 3079F DIAST BP 80-89 MM HG: CPT | Mod: CPTII,S$GLB,, | Performed by: INTERNAL MEDICINE

## 2025-08-13 ENCOUNTER — TELEPHONE (OUTPATIENT)
Dept: CARDIOLOGY | Facility: CLINIC | Age: 65
End: 2025-08-13
Payer: COMMERCIAL

## 2025-08-13 DIAGNOSIS — E78.2 MIXED HYPERLIPIDEMIA: Primary | ICD-10-CM

## 2025-08-19 ENCOUNTER — TELEPHONE (OUTPATIENT)
Facility: CLINIC | Age: 65
End: 2025-08-19
Payer: COMMERCIAL

## 2025-08-19 DIAGNOSIS — D05.12 INTRADUCTAL CARCINOMA IN SITU OF LEFT BREAST: Primary | ICD-10-CM

## 2025-08-19 DIAGNOSIS — D05.12 DUCTAL CARCINOMA IN SITU (DCIS) OF LEFT BREAST: Primary | ICD-10-CM

## 2025-08-19 DIAGNOSIS — Z79.810 USE OF TAMOXIFEN (NOLVADEX): ICD-10-CM
